# Patient Record
Sex: MALE | Race: WHITE | HISPANIC OR LATINO | Employment: OTHER | URBAN - METROPOLITAN AREA
[De-identification: names, ages, dates, MRNs, and addresses within clinical notes are randomized per-mention and may not be internally consistent; named-entity substitution may affect disease eponyms.]

---

## 2017-01-05 DIAGNOSIS — I10 ESSENTIAL HYPERTENSION: ICD-10-CM

## 2017-01-05 RX ORDER — AMLODIPINE BESYLATE 5 MG/1
TABLET ORAL
Qty: 30 TABLET | Refills: 0 | OUTPATIENT
Start: 2017-01-05

## 2017-01-05 RX ORDER — CLONAZEPAM 0.5 MG/1
TABLET ORAL
Qty: 30 TABLET | Refills: 0 | OUTPATIENT
Start: 2017-01-05

## 2017-01-10 ENCOUNTER — OFFICE VISIT (OUTPATIENT)
Dept: INTERNAL MEDICINE | Facility: CLINIC | Age: 82
End: 2017-01-10
Payer: COMMERCIAL

## 2017-01-10 VITALS
SYSTOLIC BLOOD PRESSURE: 160 MMHG | TEMPERATURE: 97 F | OXYGEN SATURATION: 96 % | WEIGHT: 221.56 LBS | DIASTOLIC BLOOD PRESSURE: 76 MMHG | HEIGHT: 68 IN | HEART RATE: 61 BPM | BODY MASS INDEX: 33.58 KG/M2

## 2017-01-10 DIAGNOSIS — G47.33 OSA (OBSTRUCTIVE SLEEP APNEA): ICD-10-CM

## 2017-01-10 DIAGNOSIS — F32.9 MAJOR DEPRESSION, CHRONIC: ICD-10-CM

## 2017-01-10 DIAGNOSIS — F41.9 ANXIETY: ICD-10-CM

## 2017-01-10 DIAGNOSIS — I10 ESSENTIAL HYPERTENSION: Primary | ICD-10-CM

## 2017-01-10 DIAGNOSIS — F51.04 CHRONIC INSOMNIA: ICD-10-CM

## 2017-01-10 PROCEDURE — 1125F AMNT PAIN NOTED PAIN PRSNT: CPT | Mod: S$GLB,,, | Performed by: INTERNAL MEDICINE

## 2017-01-10 PROCEDURE — 99214 OFFICE O/P EST MOD 30 MIN: CPT | Mod: S$GLB,,, | Performed by: INTERNAL MEDICINE

## 2017-01-10 PROCEDURE — 3077F SYST BP >= 140 MM HG: CPT | Mod: S$GLB,,, | Performed by: INTERNAL MEDICINE

## 2017-01-10 PROCEDURE — 99999 PR PBB SHADOW E&M-EST. PATIENT-LVL III: CPT | Mod: PBBFAC,,, | Performed by: INTERNAL MEDICINE

## 2017-01-10 PROCEDURE — 1159F MED LIST DOCD IN RCRD: CPT | Mod: S$GLB,,, | Performed by: INTERNAL MEDICINE

## 2017-01-10 PROCEDURE — 1157F ADVNC CARE PLAN IN RCRD: CPT | Mod: S$GLB,,, | Performed by: INTERNAL MEDICINE

## 2017-01-10 PROCEDURE — 1160F RVW MEDS BY RX/DR IN RCRD: CPT | Mod: S$GLB,,, | Performed by: INTERNAL MEDICINE

## 2017-01-10 PROCEDURE — 3078F DIAST BP <80 MM HG: CPT | Mod: S$GLB,,, | Performed by: INTERNAL MEDICINE

## 2017-01-10 RX ORDER — CLONAZEPAM 0.5 MG/1
0.5 TABLET ORAL NIGHTLY
Qty: 30 TABLET | Refills: 5 | Status: SHIPPED | OUTPATIENT
Start: 2017-01-10 | End: 2017-07-05 | Stop reason: SDUPTHER

## 2017-01-10 RX ORDER — SERTRALINE HYDROCHLORIDE 50 MG/1
50 TABLET, FILM COATED ORAL DAILY
Qty: 30 TABLET | Refills: 6 | Status: SHIPPED | OUTPATIENT
Start: 2017-01-10 | End: 2017-08-06 | Stop reason: SDUPTHER

## 2017-01-10 RX ORDER — TRAZODONE HYDROCHLORIDE 150 MG/1
150 TABLET ORAL NIGHTLY
Qty: 30 TABLET | Refills: 6 | Status: SHIPPED | OUTPATIENT
Start: 2017-01-10 | End: 2017-10-05 | Stop reason: SDUPTHER

## 2017-01-10 RX ORDER — LOSARTAN POTASSIUM 100 MG/1
100 TABLET ORAL DAILY
Qty: 30 TABLET | Refills: 6 | Status: SHIPPED | OUTPATIENT
Start: 2017-01-10 | End: 2017-05-30

## 2017-01-10 RX ORDER — AMLODIPINE BESYLATE 5 MG/1
5 TABLET ORAL DAILY
Qty: 30 TABLET | Refills: 6 | Status: SHIPPED | OUTPATIENT
Start: 2017-01-10 | End: 2017-08-06 | Stop reason: SDUPTHER

## 2017-01-10 RX ORDER — ESCITALOPRAM OXALATE 20 MG/1
TABLET ORAL
COMMUNITY
Start: 2016-03-29 | End: 2017-01-10

## 2017-01-10 NOTE — MR AVS SNAPSHOT
[facility]  [address] [facility] [place] [address]  Phone: [phone]  Fax: [phone]                  Santos Morales   1/10/2017 9:00 AM   Office Visit    Descripción:  Male : 1935   Personal Médico:  Ruby Dawkins DO   Departamento:  [facility]           Razón de la chelle     Medication Refill           Diagnósticos de Esta Visita        Comentarios    Essential hypertension    -  Primario     Major depression, chronic         Anxiety         Chronic insomnia         SAMUEL (obstructive sleep apnea)                Lista de tareas           Citas próximas        Personal Médico Departamento Tfno del dpto    2017 8:30 AM SPECIMEN, [facility] [phone]    2017 2:40 PM LABORATORY, [facility] [phone]      Metas (5 Years of Data)     Ninguna      Recetas para recoger        Disp Refills Start End    amlodipine (NORVASC) 5 MG tablet 30 tablet 6 1/10/2017     Take 1 tablet (5 mg total) by mouth once daily. - Oral    [facility]: [address] [place] [address] No. de tlfo: #: [phone]       losartan (COZAAR) 100 MG tablet 30 tablet 6 1/10/2017     Take 1 tablet (100 mg total) by mouth once daily. - Oral    [facility]: [address] [place] [address] No. de tlfo: #: [phone]       sertraline (ZOLOFT) 50 MG tablet 30 tablet 6 1/10/2017 1/10/2018    Take 1 tablet (50 mg total) by mouth once daily. - Oral    [facility]: [address] [place] [address] No. de tlfo: #: [phone]       trazodone (DESYREL) 150 MG tablet 30 tablet 6 1/10/2017 1/10/2018    Take 1 tablet (150 mg total) by mouth every evening. - Oral    [facility]: [facility] [address] [place] [address] [address] No. de tlfo: #: [phone]       clonazePAM (KLONOPIN) 0.5 MG tablet 30 tablet 5 1/10/2017     Take 1 tablet (0.5 mg total) by mouth  every evening. - Oral    [facility]: [place] [id number] - [place], [place] - [address] No. de tlfo: #: [phone]         [facility]     [facility] Línea de Enfermeras - Asistencia 24/7  Enfermeras registradas de [facility] pueden ayudarle a reservar j carlos chelle, proveer educación para la bethanie, asesoría clínica, y otros servicios de asesoramiento.   Llame para zeferino servicio gratuito a [phone].             Medicamentos           Mensaje sobre Medicamentos     Verificar los cambios y / o adiciones a jamison régimen de medicación son los mismos que discutir con jamison médico. Si cualquiera de estos cambios o adiciones son incorrectos, por favor notifique a jamison proveedor de atención médica.        CAMBIAR la forma en que está tomando estos medicamentos     Start Taking Instead of    amlodipine (NORVASC) 5 MG tablet amlodipine (NORVASC) 5 MG tablet    Dosage:  Take 1 tablet (5 mg total) by mouth once daily. Dosage:  TAKE ONE TABLET BY MOUTH ONCE DAILY    Reason for Change:  Reorder     losartan (COZAAR) 100 MG tablet losartan (COZAAR) 100 MG tablet    Dosage:  Take 1 tablet (100 mg total) by mouth once daily.     Reason for Change:  Reorder     clonazePAM (KLONOPIN) 0.5 MG tablet clonazePAM (KLONOPIN) 0.5 MG tablet    Dosage:  Take 1 tablet (0.5 mg total) by mouth every evening. Dosage:  TAKE ONE TABLET BY MOUTH ONCE DAILY    Reason for Change:  Reorder       DEJAR de kay estos medicamentos     escitalopram oxalate (LEXAPRO) 20 MG tablet TAKE ONE TABLET BY MOUTH ONCE DAILY           Verifique que la siguiente lista de medicamentos es j carlos representación exacta de los medicamentos que está tomando actualmente. Si no hay ningunos reportados, la lista puede estar en quiroz. Si no es correcta, por favor póngase en contacto con jamison proveedor de atención médica. Lleve esta lista con usted en cheko de emergencia.           Medicamentos Actuales     amlodipine (NORVASC) 5 MG tablet Take 1 tablet (5 mg total) by  "mouth once daily.    aspirin 81 MG Chew Take 81 mg by mouth once daily.    clonazePAM (KLONOPIN) 0.5 MG tablet Take 1 tablet (0.5 mg total) by mouth every evening.    ginkgo biloba 120 mg Tab Take 1 tablet by mouth once daily.    sertraline (ZOLOFT) 50 MG tablet Take 1 tablet (50 mg total) by mouth once daily.    trazodone (DESYREL) 150 MG tablet Take 1 tablet (150 mg total) by mouth every evening.    losartan (COZAAR) 100 MG tablet Take 1 tablet (100 mg total) by mouth once daily.           Información de referencia clínica           Signos vitales - más recientes  Última actualización: 1/10/2017  9:23 AM por Wendy RODRIGUEZ Pulso Temperatura [place]    (!) 160/76 (BP Location: Right arm, Patient Position: Sitting, BP Method: Manual) 61 97.4 °F (36.3 °C) (Tympanic) 5' 8" (1.727 m)    Peso SpO2 BMI (IMC)       100.5 kg (221 lb 9 oz) 96% 33.69 kg/m2       Blood Pressure          Most Recent Value    BP  (!)  160/76      Alergias     A partir del:  1/10/2017        No Known Allergies      Vacunas     Administradas en la fecha de la visita:  1/10/2017        None      Orders Placed During Today's Visit     Exámenes/Procedimientos futuros Se espera el Vence    CBC auto differential  1/10/2017 4/10/2017    Urinalysis  1/10/2017 4/10/2017    Exámenes de laboratorio recurrentes Intervalo Vence    Basic metabolic panel  Every 6 Months until 3/11/2019 3/11/2019    Lipid panel  Every 6 Months until 3/11/2019 3/11/2019      Registrarse para [facility]     La activación de jamison cuenta [facility] es tan fácil ind[place] 1-2-3!    1) Ir a [email or web address], seleccione Registrarse Ahora, meter el código de activación y jamison fecha de nacimiento, y seleccione Próximo.    [id number]  Expires: 2/24/2017  9:57 AM      2) Crear un nombre de usuario y contraseña para usar cuando se visita [facility] en el futuro y selecciona j carlos pregunta de seguridad en cheko de que pierda jamison contraseña y seleccione Próximo.    3) Introduzca jamison dirección de correo " electrónico y caleb shawna en Registrarse!    Información Adicional  Si tiene alguna pregunta, por favor, e-mail [email or web address] o yael villagomez [phone] para hablar con nuestro personal. Recuerde, [facility] no debe ser usada para necesidades urgentes. En cheko de emergencia médica, yael villagomez [phone].                 Santos Morales   1/10/2017 9:00 AM   Office Visit    Description:  Male : 1935   Provider:  Ruby Dawkins DO   Department:  [facility]           Reason for Visit     Medication Refill           Diagnoses this Visit        Comments    Essential hypertension    -  Primary     Major depression, chronic         Anxiety         Chronic insomnia         SAMUEL (obstructive sleep apnea)                To Do List           Future Appointments        Provider Department Dept Phone    2017 8:30 AM SPECIMEN, [facility] [phone]    2017 2:40 PM LABORATORY, [address] [facility] [phone]      Goals     None       These Medications        Disp Refills Start End    amlodipine (NORVASC) 5 MG tablet 30 tablet 6 1/10/2017     Take 1 tablet (5 mg total) by mouth once daily. - Oral    Pharmacy[facility] [address] [place] [address] [facility]: [phone]       losartan (COZAAR) 100 MG tablet 30 tablet 6 1/10/2017     Take 1 tablet (100 mg total) by mouth once daily. - Oral    Pharmacy: [facility] [address] [place] [address] [facility] #: [phone]       sertraline (ZOLOFT) 50 MG tablet 30 tablet 6 1/10/2017 1/10/2018    Take 1 tablet (50 mg total) by mouth once daily. - Oral    Pharmacy: [facility] [address] [place] [address]  [facility] #: [phone]       trazodone (DESYREL) 150 MG tablet 30 tablet 6 1/10/2017 1/10/2018    Take 1 tablet (150 mg total) by mouth every evening. - Oral    Pharmacy: [facility] - [place] [facility] [address] Ph #: [phone]       clonazePAM  (KLONOPIN) 0.5 MG tablet 30 tablet 5 1/10/2017     Take 1 tablet (0.5 mg total) by mouth every evening. - Oral    Pharm[facility] [place] [address] [address]  #: [phone]         [facility]     [facility] On Call Nurse Care Line - 24/7 Assistance  Registered nurses in the [facility] On Call Center provide clinical advisement, health education, appointment booking, and other advisory services.  Call for this free service at [phone].             Medications           Message regarding Medications     Verify the changes and/or additions to your medication regime listed below are the same as discussed with your clinician today.  If any of these changes or additions are incorrect, please notify your healthcare provider.        CHANGE how you are taking these medications     Start Taking Instead of    amlodipine (NORVASC) 5 MG tablet amlodipine (NORVASC) 5 MG tablet    Dosage:  Take 1 tablet (5 mg total) by mouth once daily. Dosage:  TAKE ONE TABLET BY MOUTH ONCE DAILY    Reason for Change:  Reorder     losartan (COZAAR) 100 MG tablet losartan (COZAAR) 100 MG tablet    Dosage:  Take 1 tablet (100 mg total) by mouth once daily.     Reason for Change:  Reorder     clonazePAM (KLONOPIN) 0.5 MG tablet clonazePAM (KLONOPIN) 0.5 MG tablet    Dosage:  Take 1 tablet (0.5 mg total) by mouth every evening. Dosage:  TAKE ONE TABLET BY MOUTH ONCE DAILY    Reason for Change:  Reorder       STOP taking these medications     escitalopram oxalate (LEXAPRO) 20 MG tablet TAKE ONE TABLET BY MOUTH ONCE DAILY           Verify that the below list of medications is an accurate representation of the medications you are currently taking.  If none reported, the list may be blank. If incorrect, please contact your healthcare provider. Carry this list with you in case of emergency.           Current Medications     amlodipine (NORVASC) 5 MG tablet Take 1 tablet (5 mg total) by mouth once daily.    aspirin 81 MG  "Chew Take 81 mg by mouth once daily.    clonazePAM (KLONOPIN) 0.5 MG tablet Take 1 tablet (0.5 mg total) by mouth every evening.    ginkgo biloba 120 mg Tab Take 1 tablet by mouth once daily.    sertraline (ZOLOFT) 50 MG tablet Take 1 tablet (50 mg total) by mouth once daily.    trazodone (DESYREL) 150 MG tablet Take 1 tablet (150 mg total) by mouth every evening.    losartan (COZAAR) 100 MG tablet Take 1 tablet (100 mg total) by mouth once daily.           Clinical Reference Information           Vital Signs - Last Recorded  Most recent update: 1/10/2017  9:23 AM by Wendy Pepper    BP Pulse Temp Ht    (!) 160/76 (BP Location: Right arm, Patient Position: Sitting, BP Method: Manual) 61 97.4 °F (36.3 °C) (Tympanic) 5' 8" (1.727 m)    Wt SpO2 BMI       100.5 kg (221 lb 9 oz) 96% 33.69 kg/m2       Blood Pressure          Most Recent Value    BP  (!)  160/76      Allergies as of 1/10/2017     No Known Allergies      Immunizations Administered on Date of Encounter - 1/10/2017     None      Orders Placed During Today's Visit     Future Labs/Procedures Expected by Expires    CBC auto differential  1/10/2017 4/10/2017    Urinalysis  1/10/2017 4/10/2017    Recurring Lab Work Interval Expires    Basic metabolic panel  Every 6 Months until 3/11/2019 3/11/2019    Lipid panel  Every 6 Months until 3/11/2019 3/11/2019      [facility] Sign-Up     Activating your [facility] account is as easy as 1-2-3!     1) Visit [email or web address], select Sign Up Now, enter this activation code and your date of birth, then select Next.  [email or web address]  Expires: 2/24/2017  9:57 AM      2) Create a username and password to use when you visit [facility] in the future and select a security question in case you lose your password and select Next.    3) Enter your e-mail address and click Sign Up!    Additional Information  If you have questions, please e-mail [email or web address] or call [phone] to talk to our [facility] staff. Remember, [facility] is " NOT to be used for urgent needs. For medical emergencies, dial 911.

## 2017-01-10 NOTE — PROGRESS NOTES
Subjective:       Patient ID: Santos Morales is a 81 y.o. male.    Chief Complaint: Establish Care    HPI Comments: 81 y.o. White male     Patient presents with:  Establish Care    HPI: Here today to establish care. He was previously a patient of Dr. Medina. He is here with his wife.   HTN--elevated due to being off losartan x 1 week. He did not think he needed to take it. He has been compliant with amlodipine. He denies symptoms.   Depression/anxiety--stable on sertraline. He has mild depression symptoms, but the medication has helped a lot. He takes clonazepam at night for anxiety and to help him sleep.   SAMUEL--he inconsistently wears his CPAP.     Past Medical History:    Anxiety                                                       Depression                                                    Hypertension                                                  Insomnia                                                      Mobitz type 1 second degree AV block                            Comment:followed by Dr. Hardy, Cardiology    SAMUEL (obstructive sleep apnea)                                 Past Surgical History:    CATARACT EXTRACTION W/  INTRAOCULAR LENS IMPLA* Bilateral               BACK SURGERY                                                   History reviewed.  No pertinent family history.    Current Outpatient Prescriptions:     amlodipine (NORVASC) 5 MG tablet, Take 1 tablet (5 mg total) by mouth once daily., Disp: 30 tablet, Rfl: 6    aspirin 81 MG Chew, Take 81 mg by mouth once daily., Disp: , Rfl:     clonazePAM (KLONOPIN) 0.5 MG tablet, Take 1 tablet (0.5 mg total) by mouth every evening., Disp: 30 tablet, Rfl: 5    ginkgo biloba 120 mg Tab, Take 1 tablet by mouth once daily., Disp: , Rfl:     sertraline (ZOLOFT) 50 MG tablet, Take 1 tablet (50 mg total) by mouth once daily., Disp: 30 tablet, Rfl: 6    trazodone (DESYREL) 150 MG tablet, Take 1 tablet (150 mg total) by mouth every evening., Disp: 30  tablet, Rfl: 6    losartan (COZAAR) 100 MG tablet, Take 1 tablet (100 mg total) by mouth once daily., Disp: 30 tablet, Rfl: 6    Allergies:   Review of patient's allergies indicates:  No Known Allergies            Review of Systems   Constitutional: Negative for fever and unexpected weight change.   HENT: Positive for hearing loss (wears hearing aids).    Respiratory: Negative for cough and shortness of breath.    Cardiovascular: Negative for chest pain and leg swelling.   Gastrointestinal: Positive for constipation. Negative for abdominal pain and diarrhea.   Genitourinary: Negative for dysuria.   Musculoskeletal: Negative for gait problem.   Neurological: Negative for dizziness and headaches.   Psychiatric/Behavioral: Positive for dysphoric mood and sleep disturbance. The patient is nervous/anxious.        Objective:      Physical Exam   Constitutional: He is oriented to person, place, and time. He appears well-developed and well-nourished. No distress.   Eyes: No scleral icterus.   Neck: No tracheal deviation present. No thyromegaly present.   Cardiovascular: Normal rate, regular rhythm and normal heart sounds.    Pulmonary/Chest: Effort normal and breath sounds normal. No respiratory distress.   Abdominal: Soft. Bowel sounds are normal.   Musculoskeletal: He exhibits no edema.   Lymphadenopathy:     He has no cervical adenopathy.   Neurological: He is alert and oriented to person, place, and time.   Skin: Skin is warm and dry.   Psychiatric: He has a normal mood and affect.   Vitals reviewed.      Assessment:       1. Essential hypertension    2. Major depression, chronic    3. Anxiety    4. Chronic insomnia    5. SAMUEL (obstructive sleep apnea)        Plan:       Santos was seen today for establish care.    Diagnoses and all orders for this visit:    Essential hypertension  -     amlodipine (NORVASC) 5 MG tablet; Take 1 tablet (5 mg total) by mouth once daily.  -     losartan (COZAAR) 100 MG tablet; Take 1  tablet (100 mg total) by mouth once daily.  -     Basic metabolic panel; Standing  -     Lipid panel; Standing  -     Urinalysis; Future  -     CBC auto differential; Future    Major depression, chronic  -     sertraline (ZOLOFT) 50 MG tablet; Take 1 tablet (50 mg total) by mouth once daily.    Anxiety  -     clonazePAM (KLONOPIN) 0.5 MG tablet; Take 1 tablet (0.5 mg total) by mouth every evening.    Chronic insomnia  -     trazodone (DESYREL) 150 MG tablet; Take 1 tablet (150 mg total) by mouth every evening.    SAMUEL (obstructive sleep apnea)    Schedule labs.     Nurse b/p visit in 4 weeks.

## 2017-01-11 ENCOUNTER — LAB VISIT (OUTPATIENT)
Dept: LAB | Facility: HOSPITAL | Age: 82
End: 2017-01-11
Attending: INTERNAL MEDICINE
Payer: COMMERCIAL

## 2017-01-11 DIAGNOSIS — I10 ESSENTIAL HYPERTENSION: ICD-10-CM

## 2017-01-11 LAB
ANION GAP SERPL CALC-SCNC: 7 MMOL/L
BASOPHILS # BLD AUTO: 0.02 K/UL
BASOPHILS NFR BLD: 0.3 %
BUN SERPL-MCNC: 21 MG/DL
CALCIUM SERPL-MCNC: 9.6 MG/DL
CHLORIDE SERPL-SCNC: 105 MMOL/L
CHOLEST/HDLC SERPL: 2.9 {RATIO}
CO2 SERPL-SCNC: 26 MMOL/L
CREAT SERPL-MCNC: 1.1 MG/DL
DIFFERENTIAL METHOD: NORMAL
EOSINOPHIL # BLD AUTO: 0.2 K/UL
EOSINOPHIL NFR BLD: 3.3 %
ERYTHROCYTE [DISTWIDTH] IN BLOOD BY AUTOMATED COUNT: 14 %
EST. GFR  (AFRICAN AMERICAN): >60 ML/MIN/1.73 M^2
EST. GFR  (NON AFRICAN AMERICAN): >60 ML/MIN/1.73 M^2
GLUCOSE SERPL-MCNC: 93 MG/DL
HCT VFR BLD AUTO: 43.2 %
HDL/CHOLESTEROL RATIO: 34.8 %
HDLC SERPL-MCNC: 164 MG/DL
HDLC SERPL-MCNC: 57 MG/DL
HGB BLD-MCNC: 14.3 G/DL
LDLC SERPL CALC-MCNC: 91.4 MG/DL
LYMPHOCYTES # BLD AUTO: 1.7 K/UL
LYMPHOCYTES NFR BLD: 25.6 %
MCH RBC QN AUTO: 30.3 PG
MCHC RBC AUTO-ENTMCNC: 33.1 %
MCV RBC AUTO: 92 FL
MONOCYTES # BLD AUTO: 0.6 K/UL
MONOCYTES NFR BLD: 9.6 %
NEUTROPHILS # BLD AUTO: 4.1 K/UL
NEUTROPHILS NFR BLD: 61 %
NONHDLC SERPL-MCNC: 107 MG/DL
PLATELET # BLD AUTO: 191 K/UL
PMV BLD AUTO: 10.5 FL
POTASSIUM SERPL-SCNC: 4.6 MMOL/L
RBC # BLD AUTO: 4.72 M/UL
SODIUM SERPL-SCNC: 138 MMOL/L
TRIGL SERPL-MCNC: 78 MG/DL
WBC # BLD AUTO: 6.64 K/UL

## 2017-01-11 PROCEDURE — 85025 COMPLETE CBC W/AUTO DIFF WBC: CPT

## 2017-01-11 PROCEDURE — 80061 LIPID PANEL: CPT

## 2017-01-11 PROCEDURE — 80048 BASIC METABOLIC PNL TOTAL CA: CPT

## 2017-01-11 PROCEDURE — 36415 COLL VENOUS BLD VENIPUNCTURE: CPT

## 2017-01-16 ENCOUNTER — TELEPHONE (OUTPATIENT)
Dept: INTERNAL MEDICINE | Facility: CLINIC | Age: 82
End: 2017-01-16

## 2017-01-16 NOTE — TELEPHONE ENCOUNTER
----- Message from Ruby Dawkins DO sent at 1/13/2017  5:17 PM CST -----  Notify patient UA does not show any significant abnormalities.

## 2017-01-16 NOTE — TELEPHONE ENCOUNTER
----- Message from Ruby Dawkins DO sent at 1/13/2017  5:16 PM CST -----  Notify patient CBC, BMP and lipid panel are acceptable.

## 2017-05-30 ENCOUNTER — OFFICE VISIT (OUTPATIENT)
Dept: INTERNAL MEDICINE | Facility: CLINIC | Age: 82
End: 2017-05-30
Payer: COMMERCIAL

## 2017-05-30 ENCOUNTER — HOSPITAL ENCOUNTER (OUTPATIENT)
Dept: RADIOLOGY | Facility: HOSPITAL | Age: 82
Discharge: HOME | End: 2017-05-30
Attending: INTERNAL MEDICINE
Payer: COMMERCIAL

## 2017-05-30 VITALS
HEIGHT: 69 IN | OXYGEN SATURATION: 97 % | HEART RATE: 64 BPM | TEMPERATURE: 97 F | DIASTOLIC BLOOD PRESSURE: 60 MMHG | SYSTOLIC BLOOD PRESSURE: 130 MMHG | BODY MASS INDEX: 32.33 KG/M2 | WEIGHT: 218.25 LBS

## 2017-05-30 DIAGNOSIS — G89.29 CHRONIC PAIN OF BOTH SHOULDERS: ICD-10-CM

## 2017-05-30 DIAGNOSIS — R41.3 MEMORY LOSS: ICD-10-CM

## 2017-05-30 DIAGNOSIS — M25.511 ACUTE PAIN OF RIGHT SHOULDER: Primary | ICD-10-CM

## 2017-05-30 DIAGNOSIS — M25.512 CHRONIC PAIN OF BOTH SHOULDERS: ICD-10-CM

## 2017-05-30 DIAGNOSIS — M25.511 CHRONIC PAIN OF BOTH SHOULDERS: ICD-10-CM

## 2017-05-30 DIAGNOSIS — R26.89 IMBALANCE: ICD-10-CM

## 2017-05-30 DIAGNOSIS — R26.9 GAIT DISTURBANCE: ICD-10-CM

## 2017-05-30 PROCEDURE — 73030 X-RAY EXAM OF SHOULDER: CPT | Mod: TC,50

## 2017-05-30 PROCEDURE — 99214 OFFICE O/P EST MOD 30 MIN: CPT | Mod: S$GLB,,, | Performed by: INTERNAL MEDICINE

## 2017-05-30 PROCEDURE — 1125F AMNT PAIN NOTED PAIN PRSNT: CPT | Mod: S$GLB,,, | Performed by: INTERNAL MEDICINE

## 2017-05-30 PROCEDURE — 1159F MED LIST DOCD IN RCRD: CPT | Mod: S$GLB,,, | Performed by: INTERNAL MEDICINE

## 2017-05-30 PROCEDURE — 73030 X-RAY EXAM OF SHOULDER: CPT | Mod: 26,50,, | Performed by: RADIOLOGY

## 2017-05-30 PROCEDURE — 99999 PR PBB SHADOW E&M-EST. PATIENT-LVL IV: CPT | Mod: PBBFAC,,, | Performed by: INTERNAL MEDICINE

## 2017-05-30 NOTE — PROGRESS NOTES
Santos Morales  82 y.o. White male    Chief Complaint   Patient presents with    Shoulder Pain     HPI: Presents to the clinic with his wife with complaint of right shoulder pain x 1 month. He fell while in [place] and recently re-injured it. He did not seek medical care. He is unable to raise his arm beyond 30 degrees without significant discomfort. The pain is in his anterior shoulder. He has pain in both shoulders, which is chronic, but it has worsened with injury.   He has also been having worsening memory loss. The problem is with his short term memory. He has not had any head injuries. He denies headaches. His balance is off and he is bumping into things. He denies incontinence.     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 Hardy, Cardiology    SAMUEL (obstructive sleep apnea)        Current Outpatient Prescriptions on File Prior to Visit   Medication Sig Dispense Refill    amlodipine (NORVASC) 5 MG tablet Take 1 tablet (5 mg total) by mouth once daily. 30 tablet 6    aspirin 81 MG Chew Take 81 mg by mouth once daily.      clonazePAM (KLONOPIN) 0.5 MG tablet Take 1 tablet (0.5 mg total) by mouth every evening. 30 tablet 5    ginkgo biloba 120 mg Tab Take 1 tablet by mouth once daily.      sertraline (ZOLOFT) 50 MG tablet Take 1 tablet (50 mg total) by mouth once daily. 30 tablet 6    trazodone (DESYREL) 150 MG tablet Take 1 tablet (150 mg total) by mouth every evening. 30 tablet 6     Allergies:  Review of patient's allergies indicates:  No Known Allergies    ROS:  Denies headache, dizziness, chest pain or shortness of breath    PHYSICAL EXAM:  VITAL SIGNS: Reviewed  GENERAL: Alert   HEART: Regular rate   LUNGS: Respirations unlabored  EXTREMITIES: Reduced ROM in shoulders R>L, tenderness in anterior shoulder without lesions, strength decreased in right UE, pulses intact  NEURO: No focal  neurological deficits     ASSESSMENT/PLAN:  Santos was seen today for shoulder pain.    Diagnoses and all orders for this visit:    Acute pain of right shoulder  -     MRI Upper Extremity Joint Without Contraast Right; Future    Chronic pain of both shoulders  -     X-Ray Shoulder 2 or More views Bilateral; Future    Memory loss  -     TSH; Future  -     Vitamin B12; Future  -     MRI Brain Without Contrast; Future    Imbalance  -     Vitamin B12; Future  -     MRI Brain Without Contrast; Future    Gait disturbance  -     Vitamin B12; Future  -     MRI Brain Without Contrast; Future    Schedule labs and imaging.

## 2017-05-31 ENCOUNTER — TELEPHONE (OUTPATIENT)
Dept: INTERNAL MEDICINE | Facility: CLINIC | Age: 82
End: 2017-05-31

## 2017-05-31 DIAGNOSIS — M25.511 ACUTE PAIN OF RIGHT SHOULDER: ICD-10-CM

## 2017-05-31 DIAGNOSIS — G89.29 CHRONIC PAIN OF BOTH SHOULDERS: Primary | ICD-10-CM

## 2017-05-31 DIAGNOSIS — M25.511 CHRONIC PAIN OF BOTH SHOULDERS: Primary | ICD-10-CM

## 2017-05-31 DIAGNOSIS — M25.512 CHRONIC PAIN OF BOTH SHOULDERS: Primary | ICD-10-CM

## 2017-06-01 ENCOUNTER — HOSPITAL ENCOUNTER (OUTPATIENT)
Dept: RADIOLOGY | Facility: HOSPITAL | Age: 82
Discharge: HOME | End: 2017-06-01
Attending: INTERNAL MEDICINE
Payer: COMMERCIAL

## 2017-06-01 DIAGNOSIS — M25.511 ACUTE PAIN OF RIGHT SHOULDER: ICD-10-CM

## 2017-06-01 PROCEDURE — 73221 MRI JOINT UPR EXTREM W/O DYE: CPT | Mod: TC,RT

## 2017-06-02 ENCOUNTER — TELEPHONE (OUTPATIENT)
Dept: INTERNAL MEDICINE | Facility: CLINIC | Age: 82
End: 2017-06-02

## 2017-06-02 NOTE — TELEPHONE ENCOUNTER
----- Message from Ruby Dawkins DO sent at 5/31/2017  1:12 PM CDT -----  Schedule appointment with orthopedics after MRI of shoulder completed.   Results and recommendations sent to patient's [facility] account.

## 2017-06-02 NOTE — TELEPHONE ENCOUNTER
----- Message from Helga Hickman sent at 6/2/2017  2:53 PM CDT -----  Contact: Rosa - wife  She would like for you to call her at [phone].  This is her cell.                          medrano

## 2017-06-06 ENCOUNTER — HOSPITAL ENCOUNTER (OUTPATIENT)
Dept: RADIOLOGY | Facility: HOSPITAL | Age: 82
Discharge: HOME | End: 2017-06-06
Attending: INTERNAL MEDICINE
Payer: COMMERCIAL

## 2017-06-06 ENCOUNTER — TELEPHONE (OUTPATIENT)
Dept: INTERNAL MEDICINE | Facility: CLINIC | Age: 82
End: 2017-06-06

## 2017-06-06 DIAGNOSIS — R26.89 IMBALANCE: ICD-10-CM

## 2017-06-06 DIAGNOSIS — R41.3 MEMORY LOSS: ICD-10-CM

## 2017-06-06 DIAGNOSIS — R26.9 GAIT DISTURBANCE: ICD-10-CM

## 2017-06-06 PROCEDURE — 70551 MRI BRAIN STEM W/O DYE: CPT | Mod: TC

## 2017-06-06 NOTE — TELEPHONE ENCOUNTER
----- Message from Carin Heredia sent at 6/6/2017  4:04 PM CDT -----  Contact: pt  pleae call with test results at [phone]

## 2017-06-06 NOTE — TELEPHONE ENCOUNTER
----- Message from Carin Heredia sent at 6/6/2017  4:04 PM CDT -----  Contact: pt  pleae call with test results at [phone]

## 2017-06-08 ENCOUNTER — OFFICE VISIT (OUTPATIENT)
Dept: ORTHOPEDICS | Facility: CLINIC | Age: 82
End: 2017-06-08
Payer: COMMERCIAL

## 2017-06-08 VITALS
DIASTOLIC BLOOD PRESSURE: 54 MMHG | WEIGHT: 218 LBS | HEART RATE: 63 BPM | HEIGHT: 69 IN | BODY MASS INDEX: 32.29 KG/M2 | SYSTOLIC BLOOD PRESSURE: 141 MMHG

## 2017-06-08 DIAGNOSIS — M75.121 COMPLETE TEAR OF RIGHT ROTATOR CUFF: Primary | ICD-10-CM

## 2017-06-08 DIAGNOSIS — G89.29 CHRONIC RIGHT SHOULDER PAIN: ICD-10-CM

## 2017-06-08 DIAGNOSIS — M25.511 CHRONIC RIGHT SHOULDER PAIN: ICD-10-CM

## 2017-06-08 DIAGNOSIS — M75.41 IMPINGEMENT SYNDROME OF RIGHT SHOULDER: ICD-10-CM

## 2017-06-08 DIAGNOSIS — M75.21 BICEPS TENDINITIS OF RIGHT SHOULDER: ICD-10-CM

## 2017-06-08 DIAGNOSIS — M19.011 DJD OF RIGHT AC (ACROMIOCLAVICULAR) JOINT: ICD-10-CM

## 2017-06-08 DIAGNOSIS — M19.012 GLENOHUMERAL ARTHRITIS, LEFT: ICD-10-CM

## 2017-06-08 PROCEDURE — 1125F AMNT PAIN NOTED PAIN PRSNT: CPT | Mod: S$GLB,,, | Performed by: PHYSICIAN ASSISTANT

## 2017-06-08 PROCEDURE — 99999 PR PBB SHADOW E&M-EST. PATIENT-LVL III: CPT | Mod: PBBFAC,,, | Performed by: PHYSICIAN ASSISTANT

## 2017-06-08 PROCEDURE — 1159F MED LIST DOCD IN RCRD: CPT | Mod: S$GLB,,, | Performed by: PHYSICIAN ASSISTANT

## 2017-06-08 PROCEDURE — 99203 OFFICE O/P NEW LOW 30 MIN: CPT | Mod: S$GLB,,, | Performed by: PHYSICIAN ASSISTANT

## 2017-06-08 RX ORDER — MELOXICAM 15 MG/1
15 TABLET ORAL DAILY
Qty: 30 TABLET | Refills: 1 | Status: SHIPPED | OUTPATIENT
Start: 2017-06-08 | End: 2017-09-12 | Stop reason: SDUPTHER

## 2017-06-08 NOTE — LETTER
June 8, 2017      Ruby Dawkins DO  [address] [place] [address]           [facility] - Orthopedics  [address] [place] [address]  Phone: [phone]  Fax: [phone]          Patient: Santos Morales   MR Number: [id number]   YOB: 1935   Date of Visit: 6/8/2017       Dear Dr. Ruby Dawkins:    Thank you for referring Santos Morales to me for evaluation. Attached you will find relevant portions of my assessment and plan of care.    If you have questions, please do not hesitate to call me. I look forward to following Santos Morales along with you.    Sincerely,    Namita Underwood PA-C    Enclosure  CC:  No Recipients    If you would like to receive this communication electronically, please contact [email or web address] or [phone] to request more information on P[facility] access.    For providers and/or their staff who would like to refer a patient to [facility], please contact us through our one-stop-shop provider referral line, [facility], at [phone].    If you feel you have received this communication in error or would no longer like to receive these types of communications, please e-mail [email or web address]

## 2017-06-08 NOTE — PROGRESS NOTES
Subjective:      Patient ID: Santos Morales is a 82 y.o. male.    Chief Complaint: Pain of the Right Shoulder      HPI: Santos Morales  is a 82 y.o. male who c/o Pain of the Right Shoulder   for duration of 42 years.  He denies an inciting injury from 2 years ago, but goes on to say that he had a fall on his right side about 2 months ago.  The pain in his right shoulder got much worse following the fall.  Pain level is 8 out of 10 in severity.  Quality is sharp, aching, and intermittent.  He points anteriorly as to where the pain is located.  Alleviating factors include nothing.  He has tried over-the-counter ibuprofen without relief of symptoms.  Aggravating factors include abduction as well as forward elevation above shoulder level.  In addition, he has left shoulder arthritis.  He says the left shoulder does pretty good for the most part.  He had problems with it years ago, but at present, he is not too concerned with it.  He complains of some mild decreased motion on the left side, but overall he does not complain about the left shoulder.    Review of Systems   Constitution: Negative for fever.   HENT: Negative for headaches.    Cardiovascular: Negative for chest pain.   Respiratory: Negative for cough and shortness of breath.    Skin: Negative for rash.   Musculoskeletal: Positive for joint pain, muscle weakness and stiffness. Negative for joint swelling.   Gastrointestinal: Negative for heartburn.   Neurological: Negative for numbness.         Objective:        General    Nursing note and vitals reviewed.  Constitutional: He is oriented to person, place, and time. He appears well-developed and well-nourished.   HENT:   Head: Normocephalic and atraumatic.   Eyes: EOM are normal.   Cardiovascular: Normal rate and regular rhythm.    Pulmonary/Chest: Effort normal.   Abdominal: Soft.   Neurological: He is alert and oriented to person, place, and time.   Psychiatric: He has a normal mood and affect. His behavior  is normal.         Right Shoulder Exam     Inspection/Observation   Swelling: absent  Bruising: absent  Scars: absent  Deformity: absent    Tenderness   The patient is tender to palpation of the biceps tendon and greater tuberosity.    Range of Motion   Active Abduction:  160 abnormal   Passive Abduction: abnormal   Extension: abnormal   Forward Flexion:  150 abnormal   Forward Elevation: abnormal  Adduction: normal  External Rotation 0 degrees: normal   Internal Rotation 0 degrees:  Sacrum abnormal     Tests & Signs   Drop Arm: negative  Hawkin's test: positive  Impingement: positive  Active Compression Test (Ellenton's Sign): positive  Speed's Test: positive    Other   Sensation: normal    Left Shoulder Exam     Range of Motion   Active Abduction: normal   Passive Abduction: normal   Forward Flexion: normal   Forward Elevation: normal  Adduction: normal  External Rotation 0 degrees: normal   Internal Rotation 0 degrees:  Sacrum abnormal       Muscle Strength   Right Upper Extremity   Shoulder Abduction: 5/5   Shoulder Internal Rotation: 5/5   Shoulder External Rotation: 4/5   Left Upper Extremity  Shoulder Abduction: 5/5   Shoulder Internal Rotation: 5/5   Shoulder External Rotation: 5/5     Vascular Exam     Right Pulses      Radial:                    2+      Capillary Refill  Right Hand: normal capillary refill            Xray:   Bilateral shoulders from 5/30/17 images and report were reviewed today.  I agree with the radiologist's interpretation.  Right shoulder:  Postsurgical changes noted with evidence of healed fracture in the mid third region RIGHT clavicle.  Widening of the a.c. joint with inferior spur formation.  Mild degenerative change RIGHT glenohumeral joint.  Remaining osseous structures appear intact.  Left shoulder:  Extensive degenerative change noted on the LEFT with marginal osteophyte formation, flattening of the humeral head and bone-on-bone articulation at the glenohumeral joint.   Calcification identified along the superior margin of the humeral head suggesting calcific tendinosis.  Mild degenerative change at the a.c. joint with minimal inferior spur formation.    MRI:   Right shoulder from 6/1/17 images and report were reviewed today.  I agree with the radiologist's interpretation.    1.  Massive rotator cuff tear.  2.  Circumferential labral tear.  3.  Advanced glenohumeral DJD.  4.  High-grade partial thickness biceps tendon tear with evidence of a biceps pulley lesion.  5.  Moderate joint effusion.    Assessment:       Encounter Diagnoses   Name Primary?    Complete tear of right rotator cuff Yes    Chronic right shoulder pain     DJD of right AC (acromioclavicular) joint     Impingement syndrome of right shoulder     Biceps tendinitis of right shoulder     Glenohumeral arthritis, left           Plan:       Santos was seen today for pain.    Diagnoses and all orders for this visit:    Complete tear of right rotator cuff  -     meloxicam (MOBIC) 15 MG tablet; Take 1 tablet (15 mg total) by mouth once daily. Take with food  -     Ambulatory Referral to Physical/Occupational Therapy    Chronic right shoulder pain  -     meloxicam (MOBIC) 15 MG tablet; Take 1 tablet (15 mg total) by mouth once daily. Take with food  -     Ambulatory Referral to Physical/Occupational Therapy    DJD of right AC (acromioclavicular) joint  -     meloxicam (MOBIC) 15 MG tablet; Take 1 tablet (15 mg total) by mouth once daily. Take with food  -     Ambulatory Referral to Physical/Occupational Therapy    Impingement syndrome of right shoulder  -     meloxicam (MOBIC) 15 MG tablet; Take 1 tablet (15 mg total) by mouth once daily. Take with food  -     Ambulatory Referral to Physical/Occupational Therapy    Biceps tendinitis of right shoulder  -     Ambulatory Referral to Physical/Occupational Therapy    Glenohumeral arthritis, left    Mr. Morales comes in today for new problem of the right shoulder.  He was  referred to primary care who ordered an MRI as well as some x-rays.  He has a massive rotator cuff tear in the right shoulder with a labral tear and advanced degenerative changes within the glenohumeral joint.  He also has some findings consistent with partial thickness biceps tendon tear.  I have discussed these findings with he and his wife.  He wishes to avoid surgery if at all possible.  Given that he is 82 years old, I think that's a reasonable request.  I will get him started on a prescription for meloxicam as above.  I have instructed him not to take any other oral anti-inflammatories while on meloxicam.  He may take Tylenol in addition to meloxicam as needed.  We have also discussed the option of physical therapy.  He wishes to pursue this.  I have counseled him on the fact that he may develop significant arthritis in this joint particularly without a surgery to address the massive rotator cuff tear.  Patient wishes to follow-up at the [place] location as I will be out on maternity leave starting in a few weeks.  I will have him see Dr. Jasso for further discussion regarding surgical intervention versus continued nonoperative treatment.  Patient verbalizes understanding and agrees with the above plan.    Return in about 6 weeks (around 7/20/2017) for consult with Dr. Jasso re RCT.          The patient understands, chooses and consents to this plan and accepts all   the risks which include but are not limited to the risks mentioned above.     Disclaimer: This note was prepared using a voice recognition system and is likely to have sound alike errors within the text.

## 2017-06-27 ENCOUNTER — PATIENT OUTREACH (OUTPATIENT)
Dept: ADMINISTRATIVE | Facility: HOSPITAL | Age: 82
End: 2017-06-27

## 2017-06-27 NOTE — PROGRESS NOTES
I have attempted without success to contact this patient by phone to schedule blood pressure recheck. Patient not available, left voicemail.

## 2017-07-05 DIAGNOSIS — F41.9 ANXIETY: ICD-10-CM

## 2017-07-06 RX ORDER — CLONAZEPAM 0.5 MG/1
0.5 TABLET ORAL NIGHTLY
Qty: 30 TABLET | Refills: 5 | Status: SHIPPED | OUTPATIENT
Start: 2017-07-06 | End: 2018-01-04 | Stop reason: SDUPTHER

## 2017-07-07 ENCOUNTER — TELEPHONE (OUTPATIENT)
Dept: INTERNAL MEDICINE | Facility: CLINIC | Age: 82
End: 2017-07-07

## 2017-07-07 NOTE — TELEPHONE ENCOUNTER
----- Message from Stephanie Montanez sent at 7/7/2017  1:27 PM CDT -----  Contact: Pt wife - Rosa   States she is calling to follow up on pt's refill request and can be reached at [phone]//thanks/dbw     States pt is out and to pls call it in today     1. What is the name of the medication you are requesting? clonazapam   2. What is the dose? unknwn on file per pt wife   3. How do you take the medication? Orally, topically, etc? Orally   4. How often do you take this medication? Once daily   5. Do you need a 30 day or 90 day supply? 90 days per pt wife   6. How many refills are you requesting? As many as possible per pt wife   7. What is your preferred pharmacy and location of the pharmacy? pt  8. Who can we contact with further questions? Pt wife     Pt pharm is   [facility] - [id number]  [address] [place] [address]  Phone: [phone] Fax: [phone]

## 2017-07-10 ENCOUNTER — TELEPHONE (OUTPATIENT)
Dept: INTERNAL MEDICINE | Facility: CLINIC | Age: 82
End: 2017-07-10

## 2017-07-10 NOTE — TELEPHONE ENCOUNTER
----- Message from Aidee Nicole sent at 7/10/2017 11:50 AM CDT -----  Contact: Patiemt wife, Rosa Lee states that the pharmacy has not received the authorization for her husbands medication clonazepam, please call Ms Lee back at [phone]. Thank you

## 2017-07-10 NOTE — TELEPHONE ENCOUNTER
Nurse spoke with wife, informed wife Rx was sent to [facility] on 7/6/17 to call and verifiy with pharmacy and contact nurse back. Wife voiced understanding

## 2017-07-11 ENCOUNTER — TELEPHONE (OUTPATIENT)
Dept: INTERNAL MEDICINE | Facility: CLINIC | Age: 82
End: 2017-07-11

## 2017-07-11 NOTE — TELEPHONE ENCOUNTER
----- Message from Duglas Lewis sent at 7/10/2017  1:28 PM CDT -----  Contact: pt's wife  The pt's wife is requesting a call back and can be reached at [phone]

## 2017-07-11 NOTE — TELEPHONE ENCOUNTER
Contacted wife of patient regarding her message that was left, she states that she in fact did receive her 's prescription.

## 2017-08-06 DIAGNOSIS — G89.29 CHRONIC RIGHT SHOULDER PAIN: ICD-10-CM

## 2017-08-06 DIAGNOSIS — M19.011 DJD OF RIGHT AC (ACROMIOCLAVICULAR) JOINT: ICD-10-CM

## 2017-08-06 DIAGNOSIS — I10 ESSENTIAL HYPERTENSION: ICD-10-CM

## 2017-08-06 DIAGNOSIS — F32.9 MAJOR DEPRESSION, CHRONIC: ICD-10-CM

## 2017-08-06 DIAGNOSIS — M75.41 IMPINGEMENT SYNDROME OF RIGHT SHOULDER: ICD-10-CM

## 2017-08-06 DIAGNOSIS — M25.511 CHRONIC RIGHT SHOULDER PAIN: ICD-10-CM

## 2017-08-06 DIAGNOSIS — M75.121 COMPLETE TEAR OF RIGHT ROTATOR CUFF: ICD-10-CM

## 2017-08-07 RX ORDER — SERTRALINE HYDROCHLORIDE 50 MG/1
TABLET, FILM COATED ORAL
Qty: 30 TABLET | Refills: 9 | Status: SHIPPED | OUTPATIENT
Start: 2017-08-07 | End: 2017-09-12 | Stop reason: SDUPTHER

## 2017-08-07 RX ORDER — AMLODIPINE BESYLATE 5 MG/1
TABLET ORAL
Qty: 30 TABLET | Refills: 9 | Status: SHIPPED | OUTPATIENT
Start: 2017-08-07 | End: 2018-07-23 | Stop reason: SDUPTHER

## 2017-09-12 ENCOUNTER — OFFICE VISIT (OUTPATIENT)
Dept: INTERNAL MEDICINE | Facility: CLINIC | Age: 82
End: 2017-09-12
Payer: COMMERCIAL

## 2017-09-12 VITALS
HEIGHT: 69 IN | BODY MASS INDEX: 32.69 KG/M2 | SYSTOLIC BLOOD PRESSURE: 145 MMHG | WEIGHT: 220.69 LBS | TEMPERATURE: 97 F | OXYGEN SATURATION: 96 % | DIASTOLIC BLOOD PRESSURE: 80 MMHG | HEART RATE: 56 BPM

## 2017-09-12 DIAGNOSIS — G89.29 CHRONIC PAIN OF BOTH SHOULDERS: ICD-10-CM

## 2017-09-12 DIAGNOSIS — F32.A DEPRESSION, UNSPECIFIED DEPRESSION TYPE: Primary | ICD-10-CM

## 2017-09-12 DIAGNOSIS — M25.512 CHRONIC PAIN OF BOTH SHOULDERS: ICD-10-CM

## 2017-09-12 DIAGNOSIS — M25.511 CHRONIC PAIN OF BOTH SHOULDERS: ICD-10-CM

## 2017-09-12 DIAGNOSIS — M25.511 CHRONIC RIGHT SHOULDER PAIN: ICD-10-CM

## 2017-09-12 DIAGNOSIS — F32.9 MAJOR DEPRESSION, CHRONIC: ICD-10-CM

## 2017-09-12 DIAGNOSIS — G89.29 CHRONIC RIGHT SHOULDER PAIN: ICD-10-CM

## 2017-09-12 PROCEDURE — 99213 OFFICE O/P EST LOW 20 MIN: CPT | Mod: S$GLB,,, | Performed by: FAMILY MEDICINE

## 2017-09-12 PROCEDURE — 3077F SYST BP >= 140 MM HG: CPT | Mod: S$GLB,,, | Performed by: FAMILY MEDICINE

## 2017-09-12 PROCEDURE — 99999 PR PBB SHADOW E&M-EST. PATIENT-LVL III: CPT | Mod: PBBFAC,,, | Performed by: FAMILY MEDICINE

## 2017-09-12 PROCEDURE — 1125F AMNT PAIN NOTED PAIN PRSNT: CPT | Mod: S$GLB,,, | Performed by: FAMILY MEDICINE

## 2017-09-12 PROCEDURE — 3079F DIAST BP 80-89 MM HG: CPT | Mod: S$GLB,,, | Performed by: FAMILY MEDICINE

## 2017-09-12 PROCEDURE — 3008F BODY MASS INDEX DOCD: CPT | Mod: S$GLB,,, | Performed by: FAMILY MEDICINE

## 2017-09-12 PROCEDURE — 1159F MED LIST DOCD IN RCRD: CPT | Mod: S$GLB,,, | Performed by: FAMILY MEDICINE

## 2017-09-12 RX ORDER — MELOXICAM 15 MG/1
15 TABLET ORAL DAILY
Qty: 30 TABLET | Refills: 1 | Status: SHIPPED | OUTPATIENT
Start: 2017-09-12 | End: 2017-12-15

## 2017-09-12 RX ORDER — SERTRALINE HYDROCHLORIDE 100 MG/1
100 TABLET, FILM COATED ORAL DAILY
Qty: 30 TABLET | Refills: 0 | Status: SHIPPED | OUTPATIENT
Start: 2017-09-12 | End: 2017-10-17 | Stop reason: SDUPTHER

## 2017-09-12 NOTE — PROGRESS NOTES
Subjective:       Patient ID: Santos Morales is a 82 y.o. male.    Chief Complaint: Depression and Shoulder Pain (bilateral)    HPI     81 yo M w/ PMH depression and bilateral shoulder pain - prenents with derpession and continues shoulder pain. Has been evaluated by Ortho - complete rotator tear - Patient does not want surgery. Patient is Romanian speaking and a telephone  was utilized [phone] ( [employer] service provided by nursing). Patient reports pain with his shoulders with getting dressed and moving. His wife feels his depression is worsening. He states it has been present for a very long time ( wife reports his family has a history of it, and patient has been depressed all his life). Patient is on zoloft 50mg and trazadone 150mg. He still is having very poor sleep. He denies any plan or intention of suicide.       Review of Systems   Constitutional: Negative for activity change, appetite change and fever.   HENT: Negative for congestion and sneezing.    Eyes: Negative for discharge.   Respiratory: Negative for cough and shortness of breath.    Cardiovascular: Negative for chest pain.   Gastrointestinal: Negative for abdominal pain, constipation and diarrhea.   Skin: Negative for color change.   Neurological: Negative for speech difficulty.   Psychiatric/Behavioral: Positive for sleep disturbance. Negative for suicidal ideas.       Objective:      Physical Exam   Constitutional: He is oriented to person, place, and time. He appears well-developed and well-nourished. No distress.   HENT:   Head: Normocephalic and atraumatic.   Right Ear: Hearing, tympanic membrane, external ear and ear canal normal.   Left Ear: Hearing, tympanic membrane, external ear and ear canal normal.   Nose: Nose normal. Right sinus exhibits no maxillary sinus tenderness and no frontal sinus tenderness. Left sinus exhibits no maxillary sinus tenderness and no frontal sinus tenderness.   Mouth/Throat: Uvula is midline,  oropharynx is clear and moist and mucous membranes are normal.   Eyes: Conjunctivae are normal. Right eye exhibits no discharge. Left eye exhibits no discharge.   Neck: Trachea normal, normal range of motion and full passive range of motion without pain.   Cardiovascular: Normal rate, regular rhythm, normal heart sounds and intact distal pulses.    Pulmonary/Chest: Effort normal and breath sounds normal. No respiratory distress. He has no decreased breath sounds. He has no wheezes.   Abdominal: Soft. Normal appearance and bowel sounds are normal. He exhibits no distension and no mass. There is no tenderness. There is no guarding. No hernia.   Musculoskeletal: Normal range of motion. He exhibits no edema.   Lymphadenopathy:     He has no cervical adenopathy.   Neurological: He is alert and oriented to person, place, and time.   Skin: Skin is warm, dry and intact. No rash noted. No erythema. No pallor.   Psychiatric: He has a normal mood and affect. His speech is normal and behavior is normal. Thought content normal.       Assessment:       1. Depression, unspecified depression type    2. Chronic pain of both shoulders    3. Major depression, chronic    4. Chronic right shoulder pain        Plan:         Major depression, chronic    Lifelong problem. Has been on medication for some time. Plan on increasing zoloft dose from 50 to 100mg. Patient to follow up in 4 weeks for review. Additionally discussed maintaining activity despite chronic pain in shoulders. Encouraged walking. I believe there to be a signficant connection between his chronic pain and his depression. Will likely require surgery in future. Refilling Mobic in effort to help.    -     sertraline (ZOLOFT) 100 MG tablet; Take 1 tablet (100 mg total) by mouth once daily.  Dispense: 30 tablet; Refill: 0    Complete tear of right rotator cuff  -      Patient declined surgery. We discussed pain management for chornic pain - but as of no patient and wife decided  not to present there, but reather to continue taking mobic (of which he has run out of). Continue to monitor in future, I believe he would benefit from pain management, as it may improve his mood as well. Plan on discussing PT at next visit. Patient was meant to follow up with Ortho MD, but unsure as to why didn't.                    No Follow-up on file.

## 2017-09-12 NOTE — PATIENT INSTRUCTIONS
Dr. Santacruz Instructions:      Depression:  We are increased Zoloft dose. This will take time work. I do want you to stay active as you can.    Shoulder pain:  Refilling mobic. We are going to check your blood work in 1 month. I would like to see you again in 1 month.       La depresión: Consejos para ayudarse a sí mismo  Mientras ana proveedores de atención médica le verito tratamiento para la depresión, usted también puede ayudarse a sí mismo. Recuerde que tiene j carlos enfermedad que le afecta en el aspecto físico, emocional, mental y social. La recuperación completa puede kay tiempo. Cuide jamison cuerpo y jamison mente, y tenga paciencia consigo mismo mientras se recupera.    Esté con otras personas  No se aísle de los demás. Si lo hace, se sentirá peor. Trate de pasar tiempo en compañía de otras personas y participe en actividades divertidas cuando pueda. Vaya a jose j carlos película, un partido, un servicio religioso o un evento social. Hable francamente con personas de confianza y acepte jamison ayuda cuando se la ofrezcan.  Mantenga j carlos perspectiva cristiana  · La depresión puede ofuscar jamison juicio, por lo que es importante que espere hasta que se sienta mejor antes de kay decisiones importantes, ind[place] un cambio de trabajo, mudanza, matrimonio o divorcio.  · Recuerde que esta enfermedad no es culpa suya. No se gulpe por jamison depresión.  · Recuperarse de j carlos depresión es un proceso que sukhi jamison tiempo. No se desanime si no se siente mejor de inmediato.  · La depresión sherrell energía y dificulta la capacidad de concentración. Por lo tanto, no podrá hacer todas las cosas que hacía antes. Establezca objetivos razonables y natalia lo que pueda para alcanzarlos.  Cuide jamison cuerpo  A menudo, las personas que tienen depresión hill de cuidarse, lo cual empeora aun más ana problemas. Gerald el tratamiento, y después, es importante que recuerde lo siguiente:  · Natalia ejercicio. El ejercicio es j carlos manera excelente de cuidar jamison cuerpo y los estudios  médicos indican que también ayuda a combatir la depresión.  · Evite las drogas y el alcohol. Es posible que le proporcionen alivio a corto plazo peterson, a kevin plazo, simplemente agravarán ana problemas.  · Trate de reducir el estrés. Pida a jamison proveedor de atención médica que le recomiende ejercicios y técnicas de relajación para aliviar el estrés.  · Coma j carlos dieta сергей y rajesh equilibrada para ayudar a mantener la bethanie de jamison cuerpo.  Date Last Reviewed: 1/19/2015  © 0070-3343 [employer], [employer]. [address] [place] [address], [place] [address]. Todos los derechos reservados. Esta información no pretende sustituir la atención médica profesional. Sólo jamison médico puede diagnosticar y tratar un problema de bethanie.

## 2017-09-18 RX ORDER — MELOXICAM 15 MG/1
TABLET ORAL
Qty: 30 TABLET | Refills: 0 | OUTPATIENT
Start: 2017-09-18

## 2017-10-05 DIAGNOSIS — F51.04 CHRONIC INSOMNIA: ICD-10-CM

## 2017-10-06 RX ORDER — TRAZODONE HYDROCHLORIDE 150 MG/1
TABLET ORAL
Qty: 30 TABLET | Refills: 11 | Status: SHIPPED | OUTPATIENT
Start: 2017-10-06 | End: 2018-10-03 | Stop reason: SDUPTHER

## 2017-10-17 ENCOUNTER — OFFICE VISIT (OUTPATIENT)
Dept: INTERNAL MEDICINE | Facility: CLINIC | Age: 82
End: 2017-10-17
Payer: COMMERCIAL

## 2017-10-17 ENCOUNTER — LAB VISIT (OUTPATIENT)
Dept: LAB | Facility: HOSPITAL | Age: 82
End: 2017-10-17
Attending: FAMILY MEDICINE
Payer: COMMERCIAL

## 2017-10-17 VITALS
HEART RATE: 54 BPM | BODY MASS INDEX: 31.34 KG/M2 | TEMPERATURE: 97 F | HEIGHT: 69 IN | OXYGEN SATURATION: 97 % | WEIGHT: 211.63 LBS | DIASTOLIC BLOOD PRESSURE: 44 MMHG | SYSTOLIC BLOOD PRESSURE: 126 MMHG

## 2017-10-17 DIAGNOSIS — I83.93 VARICOSE VEINS OF BOTH LOWER EXTREMITIES: ICD-10-CM

## 2017-10-17 DIAGNOSIS — F32.9 MAJOR DEPRESSION, CHRONIC: ICD-10-CM

## 2017-10-17 DIAGNOSIS — F32.9 MAJOR DEPRESSION, CHRONIC: Primary | ICD-10-CM

## 2017-10-17 LAB
ALBUMIN SERPL BCP-MCNC: 3.5 G/DL
ALP SERPL-CCNC: 59 U/L
ALT SERPL W/O P-5'-P-CCNC: 11 U/L
ANION GAP SERPL CALC-SCNC: 6 MMOL/L
AST SERPL-CCNC: 15 U/L
BASOPHILS # BLD AUTO: 0.08 K/UL
BASOPHILS NFR BLD: 1.2 %
BILIRUB SERPL-MCNC: 0.8 MG/DL
BUN SERPL-MCNC: 24 MG/DL
CALCIUM SERPL-MCNC: 9.7 MG/DL
CHLORIDE SERPL-SCNC: 105 MMOL/L
CHOLEST SERPL-MCNC: 154 MG/DL
CHOLEST/HDLC SERPL: 3.1 {RATIO}
CO2 SERPL-SCNC: 28 MMOL/L
CREAT SERPL-MCNC: 1.2 MG/DL
DIFFERENTIAL METHOD: NORMAL
EOSINOPHIL # BLD AUTO: 0.2 K/UL
EOSINOPHIL NFR BLD: 3.1 %
ERYTHROCYTE [DISTWIDTH] IN BLOOD BY AUTOMATED COUNT: 13.7 %
EST. GFR  (AFRICAN AMERICAN): >60 ML/MIN/1.73 M^2
EST. GFR  (NON AFRICAN AMERICAN): 56 ML/MIN/1.73 M^2
GLUCOSE SERPL-MCNC: 94 MG/DL
HCT VFR BLD AUTO: 43.4 %
HDLC SERPL-MCNC: 49 MG/DL
HDLC SERPL: 31.8 %
HGB BLD-MCNC: 14.2 G/DL
IMM GRANULOCYTES # BLD AUTO: 0.01 K/UL
IMM GRANULOCYTES NFR BLD AUTO: 0.1 %
LDLC SERPL CALC-MCNC: 91.8 MG/DL
LYMPHOCYTES # BLD AUTO: 2 K/UL
LYMPHOCYTES NFR BLD: 28.4 %
MCH RBC QN AUTO: 30.5 PG
MCHC RBC AUTO-ENTMCNC: 32.7 G/DL
MCV RBC AUTO: 93 FL
MONOCYTES # BLD AUTO: 0.6 K/UL
MONOCYTES NFR BLD: 8.3 %
NEUTROPHILS # BLD AUTO: 4 K/UL
NEUTROPHILS NFR BLD: 58.9 %
NONHDLC SERPL-MCNC: 105 MG/DL
NRBC BLD-RTO: 0 /100 WBC
PLATELET # BLD AUTO: 198 K/UL
PMV BLD AUTO: 10.4 FL
POTASSIUM SERPL-SCNC: 4.8 MMOL/L
PROT SERPL-MCNC: 6.7 G/DL
RBC # BLD AUTO: 4.66 M/UL
SODIUM SERPL-SCNC: 139 MMOL/L
TRIGL SERPL-MCNC: 66 MG/DL
WBC # BLD AUTO: 6.86 K/UL

## 2017-10-17 PROCEDURE — 99213 OFFICE O/P EST LOW 20 MIN: CPT | Mod: S$GLB,,, | Performed by: FAMILY MEDICINE

## 2017-10-17 PROCEDURE — 80053 COMPREHEN METABOLIC PANEL: CPT

## 2017-10-17 PROCEDURE — 80061 LIPID PANEL: CPT

## 2017-10-17 PROCEDURE — 85025 COMPLETE CBC W/AUTO DIFF WBC: CPT

## 2017-10-17 PROCEDURE — 99999 PR PBB SHADOW E&M-EST. PATIENT-LVL III: CPT | Mod: PBBFAC,,, | Performed by: FAMILY MEDICINE

## 2017-10-17 PROCEDURE — 36415 COLL VENOUS BLD VENIPUNCTURE: CPT

## 2017-10-17 RX ORDER — SERTRALINE HYDROCHLORIDE 100 MG/1
100 TABLET, FILM COATED ORAL DAILY
Qty: 90 TABLET | Refills: 0 | Status: SHIPPED | OUTPATIENT
Start: 2017-10-17 | End: 2018-01-05 | Stop reason: SDUPTHER

## 2017-10-17 NOTE — PROGRESS NOTES
Subjective:       Patient ID: Santos Morales is a 82 y.o. male.    Chief Complaint: Depression (4 wk f/u) and Leg Pain (right leg)    HPI     81 yo M    PMH     Depression, HTN, SAMUEL    Current medicines:  Blood pressure - amlodipine  Zoloft 100mg  Trazadone 1.5 tablets - helping him sleep     Has sleep apnea - does not wear        Follow up for depression.  On 100 mg of zoloft. Increased from 50mg.    Reports no change - no benefit.    States it will not be cured - because has had it all his life.    Lost hearing aid on vacation. Worsened his depression.    Hurts in groin. Painful to touch. Longstanding.  Has hurt for 20 years.    Has varicose veins.    Reports occasional weakness.    Review of Systems   Constitutional: Negative for chills and fever.   Respiratory: Negative for shortness of breath.    Cardiovascular: Negative for chest pain.   Neurological: Negative for dizziness and light-headedness.       Objective:      Physical Exam   Constitutional: He is oriented to person, place, and time. He appears well-developed and well-nourished. No distress.   HENT:   Head: Normocephalic and atraumatic.   Right Ear: Hearing, tympanic membrane, external ear and ear canal normal.   Left Ear: Hearing, tympanic membrane, external ear and ear canal normal.   Nose: Nose normal. Right sinus exhibits no maxillary sinus tenderness and no frontal sinus tenderness. Left sinus exhibits no maxillary sinus tenderness and no frontal sinus tenderness.   Mouth/Throat: Uvula is midline, oropharynx is clear and moist and mucous membranes are normal.   Eyes: Conjunctivae are normal. Right eye exhibits no discharge. Left eye exhibits no discharge.   Neck: Trachea normal, normal range of motion and full passive range of motion without pain.   Cardiovascular: Normal rate, regular rhythm, normal heart sounds and intact distal pulses.    Pulmonary/Chest: Effort normal and breath sounds normal. No respiratory distress. He has no decreased  breath sounds. He has no wheezes.   Abdominal: Soft. Normal appearance and bowel sounds are normal. He exhibits no distension and no mass. There is no tenderness. There is no guarding. No hernia.   Musculoskeletal: Normal range of motion. He exhibits no edema or deformity.   Lymphadenopathy:     He has no cervical adenopathy.   Neurological: He is alert and oriented to person, place, and time.   Skin: Skin is warm, dry and intact. No rash noted. No erythema. No pallor.   Noted varicose veins in both legs.   Psychiatric: He has a normal mood and affect. His speech is normal and behavior is normal. Thought content normal.       Assessment:       1. Major depression, chronic    2. Varicose veins of both lower extremities        Plan:   Major depression, chronic  Increased zoloft 4 weeks ago. Refilled.  Increased trazadone to 1.5 tablets - has helped sleep.    I would like him to see mental health specialist - for depression - preferably one who speaks Afghan.    Communication is very difficult given language barrier. Have used  services in past. Sending to establish care with Afghan speaking provider given my concern that poor communication is not optimizing patient's care.    Patient refuses to see mental health specialist.  Saw some in [place] many decades ago - did nothing for him.    I believe depression is worsened by inability to hear - lost hearing aid on trip to [place].    CBC, CMP, Lipid.          Varicose veins of both lower extremities  Compression  Education    Follow up in 3 months with [place] Speaking Physician.  Attempted several calls trying to find Afghan speaking mental health specialist              No Follow-up on file.

## 2017-10-17 NOTE — PATIENT INSTRUCTIONS
Cuidado personal de la araña vascular y las varices  Jamison proveedor de atención médica podrá sugerirle que intente el cuidado personal. El ejercicio y el mantenimiento de un peso saludable pueden evitar que empeoren las varices. Ponerse medias elásticas y mantener las piernas elevadas ayuda a mejorar la circulación de la suzette. Erik descansos al estar sentado o de pie también puede ser útil.  Ejercicio  El ejercicio es pereira para las venas varicosas porque mejora la circulación de la suzette. Caminar, montar en bicicleta o nadar son ejercicios para mantener sanas las venas. Asegúrese de consultar con jamison médico antes comenzar un programa de ejercicio. Recuerde esto:  · Al hacer ejercicio, comience despacio y vaya aumentando gradualmente hasta llegar a hacer 30 minutos la mayoría de los días.  · Eleve las piernas por encima del nivel del corazón después de hacer ejercicio para evitar que la suzette se estanque en las venas.  Cómo mantener un peso saludable  El sobrepeso ejerce demasiada presión sobre las venas. Para mantener un peso saludable, intente estos consejos:  · Elija rich magras, pescado y sujata sin piel.  · Compre productos lácteos bajos en grasa.  · Elija comidas con alto contenido de fibra, india cereales de grano integral, frutas y verduras.  · Reduzca el consumo de azúcar, sal y las grasas saturadas y trans  · Natalia ejercicio con regularidad.  Uso de las medias elásticas de compresión  Las medias elásticas ejercen j carlos presión suave sobre las venas y ayudan a la suzette a fluir hacia arriba. Si necesita medias elásticas, jamison proveedor de atención médica se las recomendará. Siga las indicaciones del proveedor de atención médica acerca de la forma y frecuencia de uso. Pregúntele a jamison proveedor de atención médica qué talbert ajustadas deben quedarle las medias elásticas para que le brinden el mayor beneficio.  Eleve las piernas  Elevar las piernas por encima del nivel del corazón le ayudará a aliviar la hinchazón  y a evitar que la suzette se estanque en las venas. Intente elevar las piernas joyce 15-20 minutos al final del día, y siempre que se esté relajando. Para elevar las piernas por encima del nivel del corazón, apóyelas sobre cojines o almohadas grandes.     Al estar parado, póngase de puntillas elevando y bajando los talones.        El borde superior de las medias elásticas debe quedar por debajo de la rodilla para un ajuste adecuado.   Al estar sentado o de pie  Para mantener la suzette circulando cuando tiene que permanecer sentado o parado joyce largos períodos, pruebe estos consejos:  · En el trabajo, salga a caminar joyce los descansos, en vez de kay café. Camine también joyce la hora del almuerzo. O rajesh intente flexionar los pies hacia arriba y hacia abajo 10 veces cada hora.  · Al estar de pie, póngase de puntillas elevando y bajando los talones, o rajesh balancéese sobre los talones hacia adelante y hacia atrás.  Date Last Reviewed: 5/1/2016  © 4842-2382 [employer], [employer]. [address] [place] [address]. Todos los derechos reservados. Esta información no pretende sustituir la atención médica profesional. Sólo jamison médico puede diagnosticar y tratar un problema de bethanie.

## 2017-10-23 ENCOUNTER — TELEPHONE (OUTPATIENT)
Dept: INTERNAL MEDICINE | Facility: CLINIC | Age: 82
End: 2017-10-23

## 2017-10-23 NOTE — TELEPHONE ENCOUNTER
----- Message from Warner Santacruz MD sent at 10/19/2017  1:02 PM CDT -----  Please call the patient regarding his abnormal result  Lab of note - renal function - slightly diminished as compared to recent. Advise him to avoid taking Mobic or other anti-inflammatories - tylenol ok. If shoulder pain is bad - he should see ortho again.

## 2017-10-23 NOTE — TELEPHONE ENCOUNTER
Notified pts wife, Kameron, of results. She verbalized understanding and states he is feeling better.

## 2017-12-15 ENCOUNTER — OFFICE VISIT (OUTPATIENT)
Dept: FAMILY MEDICINE | Facility: CLINIC | Age: 82
End: 2017-12-15
Payer: COMMERCIAL

## 2017-12-15 VITALS
SYSTOLIC BLOOD PRESSURE: 130 MMHG | BODY MASS INDEX: 32.03 KG/M2 | OXYGEN SATURATION: 95 % | HEIGHT: 69 IN | DIASTOLIC BLOOD PRESSURE: 64 MMHG | HEART RATE: 58 BPM | TEMPERATURE: 97 F | WEIGHT: 216.25 LBS

## 2017-12-15 DIAGNOSIS — E66.9 OBESITY, UNSPECIFIED CLASSIFICATION, UNSPECIFIED OBESITY TYPE, UNSPECIFIED WHETHER SERIOUS COMORBIDITY PRESENT: ICD-10-CM

## 2017-12-15 DIAGNOSIS — G47.30 SLEEP APNEA, UNSPECIFIED TYPE: ICD-10-CM

## 2017-12-15 DIAGNOSIS — I10 HYPERTENSION, UNSPECIFIED TYPE: ICD-10-CM

## 2017-12-15 DIAGNOSIS — F33.41 RECURRENT MAJOR DEPRESSIVE DISORDER, IN PARTIAL REMISSION: Primary | ICD-10-CM

## 2017-12-15 DIAGNOSIS — M75.101 TEAR OF RIGHT ROTATOR CUFF, UNSPECIFIED TEAR EXTENT: ICD-10-CM

## 2017-12-15 PROCEDURE — 99999 PR PBB SHADOW E&M-EST. PATIENT-LVL III: CPT | Mod: PBBFAC,,, | Performed by: FAMILY MEDICINE

## 2017-12-15 PROCEDURE — 99214 OFFICE O/P EST MOD 30 MIN: CPT | Mod: S$GLB,,, | Performed by: FAMILY MEDICINE

## 2017-12-15 NOTE — LETTER
December 15, 2017      Warner Santacruz MD  [address] [facility] [address]           [address] [address]  Phone: [phone]  Fax: [phone]          Patient: Santos Morales   MR Number: [id number]   YOB: 1935   Date of Visit: 12/15/2017       Dear Dr. Warner Santacruz:    Thank you for referring Santos Morales to me for evaluation. Attached you will find relevant portions of my assessment and plan of care.    If you have questions, please do not hesitate to call me. I look forward to following Santos Morales along with you.    Sincerely,    Diane Bowen MD    Enclosure  CC:  No Recipients    If you would like to receive this communication electronically, please contact [email or web address] or [phone] to request more information on [employer] Link access.    For providers and/or their staff who would like to refer a patient to [facility], please contact us through our one-stop-shop provider referral line, [facility] , at [phone].    If you feel you have received this communication in error or would no longer like to receive these types of communications, please e-mail [email or web address]

## 2017-12-15 NOTE — PROGRESS NOTES
Subjective:       Patient ID: Santos Morales is a 82 y.o. male.    Chief Complaint: Establish Care    82 y old male with depression , htn , obesity , sleep apnea  and R  rotator cuff tear   here to get established.He has had depression all his life . He has been seen by multiple Psych . He feels stable at the moment . No  Psych admission , no suicidal thoughts, nor  attempts . Refuses seeing  Psych now . Not exercising . He has daily r shoulder pain . He did not do PT when prescribed by ortho . He was also offered  surgery but he declined. He doesn't  wear CPAP . IT is  too uncomfortable       Review of Systems   Constitutional: Negative.    HENT: Negative.    Respiratory: Negative.    Cardiovascular: Negative.    Gastrointestinal: Negative.    Genitourinary: Negative.    Musculoskeletal: Negative.    Hematological: Negative.    Psychiatric/Behavioral: Negative.        Objective:      Physical Exam   Constitutional: He is oriented to person, place, and time. He appears well-developed and well-nourished. No distress.   HENT:   Head: Normocephalic and atraumatic.   Right Ear: External ear normal.   Left Ear: External ear normal.   Nose: Nose normal.   Mouth/Throat: No oropharyngeal exudate.   Eyes: Conjunctivae and EOM are normal. Pupils are equal, round, and reactive to light. Right eye exhibits no discharge. Left eye exhibits no discharge. No scleral icterus.   Neck: Normal range of motion. Neck supple. No JVD present. No tracheal deviation present. No thyromegaly present.   Cardiovascular: Normal rate, regular rhythm, normal heart sounds and intact distal pulses.  Exam reveals no gallop and no friction rub.    No murmur heard.  Pulmonary/Chest: Effort normal and breath sounds normal. No stridor. No respiratory distress. He has no wheezes. He has no rales. He exhibits no tenderness.   Abdominal: Soft. Bowel sounds are normal. He exhibits no distension. There is no tenderness. There is no rebound and no guarding.    Musculoskeletal: He exhibits no edema.        Right shoulder: He exhibits decreased range of motion, tenderness and bony tenderness.   Lymphadenopathy:     He has no cervical adenopathy.   Neurological: He is alert and oriented to person, place, and time. He has normal reflexes. He displays normal reflexes. No cranial nerve deficit. He exhibits normal muscle tone. Coordination normal.   Skin: Skin is warm and dry. No rash noted. He is not diaphoretic. No erythema. No pallor.   Psychiatric: He has a normal mood and affect. His behavior is normal. Judgment and thought content normal.       Assessment:       1. Recurrent major depressive disorder, in partial remission    2. Tear of right rotator cuff, unspecified tear extent    3. Hypertension, unspecified type    4. Obesity, unspecified classification, unspecified obesity type, unspecified whether serious comorbidity present        Plan:     Santos was seen today for establish care.    Diagnoses and all orders for this visit:    Recurrent major depressive disorder, in partial remission    Tear of right rotator cuff, unspecified tear extent    Hypertension, unspecified type    Obesity, unspecified classification, unspecified obesity type, unspecified whether serious comorbidity present     Stable . cont med Will contemplate psych ref   reconsider PT   Controlled. Cont med   The patient is asked to make an attempt to improve diet and exercise patterns to aid in medical management of this problem.

## 2018-01-04 DIAGNOSIS — F41.9 ANXIETY: ICD-10-CM

## 2018-01-05 DIAGNOSIS — F32.9 MAJOR DEPRESSION, CHRONIC: ICD-10-CM

## 2018-01-05 RX ORDER — SERTRALINE HYDROCHLORIDE 100 MG/1
TABLET, FILM COATED ORAL
Qty: 90 TABLET | Refills: 0 | Status: SHIPPED | OUTPATIENT
Start: 2018-01-05 | End: 2018-03-03 | Stop reason: SDUPTHER

## 2018-01-05 RX ORDER — CLONAZEPAM 0.5 MG/1
TABLET ORAL
Qty: 30 TABLET | Refills: 5 | Status: SHIPPED | OUTPATIENT
Start: 2018-01-05 | End: 2018-07-24 | Stop reason: SDUPTHER

## 2018-03-03 DIAGNOSIS — F32.9 MAJOR DEPRESSION, CHRONIC: ICD-10-CM

## 2018-03-05 RX ORDER — SERTRALINE HYDROCHLORIDE 100 MG/1
TABLET, FILM COATED ORAL
Qty: 90 TABLET | Refills: 0 | Status: SHIPPED | OUTPATIENT
Start: 2018-03-05 | End: 2018-07-12 | Stop reason: SDUPTHER

## 2018-06-10 DIAGNOSIS — I10 ESSENTIAL HYPERTENSION: ICD-10-CM

## 2018-06-11 RX ORDER — AMLODIPINE BESYLATE 5 MG/1
TABLET ORAL
Qty: 30 TABLET | Refills: 9 | OUTPATIENT
Start: 2018-06-11

## 2018-07-12 DIAGNOSIS — F41.9 ANXIETY: ICD-10-CM

## 2018-07-12 DIAGNOSIS — F32.9 MAJOR DEPRESSION, CHRONIC: ICD-10-CM

## 2018-07-12 DIAGNOSIS — I10 ESSENTIAL HYPERTENSION: ICD-10-CM

## 2018-07-13 RX ORDER — AMLODIPINE BESYLATE 5 MG/1
TABLET ORAL
Qty: 30 TABLET | Refills: 9 | OUTPATIENT
Start: 2018-07-13

## 2018-07-13 RX ORDER — CLONAZEPAM 0.5 MG/1
TABLET ORAL
Qty: 30 TABLET | Refills: 5 | OUTPATIENT
Start: 2018-07-13

## 2018-07-13 RX ORDER — SERTRALINE HYDROCHLORIDE 100 MG/1
TABLET, FILM COATED ORAL
Qty: 90 TABLET | Refills: 0 | Status: SHIPPED | OUTPATIENT
Start: 2018-07-13 | End: 2018-10-12 | Stop reason: SDUPTHER

## 2018-07-18 ENCOUNTER — PATIENT MESSAGE (OUTPATIENT)
Dept: FAMILY MEDICINE | Facility: CLINIC | Age: 83
End: 2018-07-18

## 2018-07-20 ENCOUNTER — TELEPHONE (OUTPATIENT)
Dept: FAMILY MEDICINE | Facility: CLINIC | Age: 83
End: 2018-07-20

## 2018-07-20 NOTE — TELEPHONE ENCOUNTER
----- Message from Vincent Cano sent at 7/20/2018  8:39 AM CDT -----  Contact: marco Lewis       [facility]     [phone]   Fax [phone]

## 2018-07-23 DIAGNOSIS — I10 ESSENTIAL HYPERTENSION: ICD-10-CM

## 2018-07-23 RX ORDER — AMLODIPINE BESYLATE 5 MG/1
5 TABLET ORAL DAILY
Qty: 90 TABLET | Refills: 0 | Status: SHIPPED | OUTPATIENT
Start: 2018-07-23 | End: 2018-08-24 | Stop reason: SDUPTHER

## 2018-07-24 ENCOUNTER — HOSPITAL ENCOUNTER (OUTPATIENT)
Dept: RADIOLOGY | Facility: HOSPITAL | Age: 83
Discharge: HOME | End: 2018-07-24
Attending: FAMILY MEDICINE
Payer: COMMERCIAL

## 2018-07-24 ENCOUNTER — OFFICE VISIT (OUTPATIENT)
Dept: FAMILY MEDICINE | Facility: CLINIC | Age: 83
End: 2018-07-24
Payer: COMMERCIAL

## 2018-07-24 VITALS
HEART RATE: 60 BPM | SYSTOLIC BLOOD PRESSURE: 132 MMHG | BODY MASS INDEX: 30.64 KG/M2 | DIASTOLIC BLOOD PRESSURE: 64 MMHG | TEMPERATURE: 97 F | HEIGHT: 69 IN | WEIGHT: 206.88 LBS | OXYGEN SATURATION: 96 %

## 2018-07-24 DIAGNOSIS — R41.3 MEMORY LOSS: ICD-10-CM

## 2018-07-24 DIAGNOSIS — F41.9 ANXIETY: ICD-10-CM

## 2018-07-24 DIAGNOSIS — R41.3 MEMORY LOSS: Primary | ICD-10-CM

## 2018-07-24 LAB
AMORPH CRY UR QL COMP ASSIST: ABNORMAL
BACTERIA #/AREA URNS AUTO: ABNORMAL /HPF
BILIRUB UR QL STRIP: NEGATIVE
CLARITY UR REFRACT.AUTO: ABNORMAL
COLOR UR AUTO: YELLOW
GLUCOSE UR QL STRIP: NEGATIVE
HGB UR QL STRIP: NEGATIVE
HYALINE CASTS UR QL AUTO: 0 /LPF
KETONES UR QL STRIP: NEGATIVE
LEUKOCYTE ESTERASE UR QL STRIP: NEGATIVE
MICROSCOPIC COMMENT: ABNORMAL
NITRITE UR QL STRIP: NEGATIVE
PH UR STRIP: 5 [PH] (ref 5–8)
PROT UR QL STRIP: ABNORMAL
RBC #/AREA URNS AUTO: 0 /HPF (ref 0–4)
SP GR UR STRIP: 1.03 (ref 1–1.03)
SQUAMOUS #/AREA URNS AUTO: 2 /HPF
URN SPEC COLLECT METH UR: ABNORMAL
UROBILINOGEN UR STRIP-ACNC: 2 EU/DL
WBC #/AREA URNS AUTO: 0 /HPF (ref 0–5)

## 2018-07-24 PROCEDURE — 71046 X-RAY EXAM CHEST 2 VIEWS: CPT | Mod: 26,,, | Performed by: RADIOLOGY

## 2018-07-24 PROCEDURE — 99999 PR PBB SHADOW E&M-EST. PATIENT-LVL IV: CPT | Mod: PBBFAC,,, | Performed by: FAMILY MEDICINE

## 2018-07-24 PROCEDURE — 81001 URINALYSIS AUTO W/SCOPE: CPT

## 2018-07-24 PROCEDURE — 3078F DIAST BP <80 MM HG: CPT | Mod: CPTII,S$GLB,, | Performed by: FAMILY MEDICINE

## 2018-07-24 PROCEDURE — 71046 X-RAY EXAM CHEST 2 VIEWS: CPT | Mod: TC,PO

## 2018-07-24 PROCEDURE — 3075F SYST BP GE 130 - 139MM HG: CPT | Mod: CPTII,S$GLB,, | Performed by: FAMILY MEDICINE

## 2018-07-24 PROCEDURE — 99214 OFFICE O/P EST MOD 30 MIN: CPT | Mod: S$GLB,,, | Performed by: FAMILY MEDICINE

## 2018-07-24 RX ORDER — CLONAZEPAM 0.5 MG/1
0.5 TABLET ORAL NIGHTLY
Qty: 30 TABLET | Refills: 2 | Status: SHIPPED | OUTPATIENT
Start: 2018-07-24 | End: 2018-10-12 | Stop reason: SDUPTHER

## 2018-07-24 NOTE — PROGRESS NOTES
Subjective:       Patient ID: Santos Morales is a 83 y.o. male.    Chief Complaint: Medication Refill and Follow-up    83 y old male with depression and anxiety here for f.u . Doing fair . Gets melancholic once  in a while . Non suicidal . Wife has noted memory loss: forgets what he supposed to get from room , had also forgotten that he came to this clinic to see us last December . No confusion . Independent with ADL . Had issues with alcohol before. He binges sometimes       Medication Refill       Review of Systems   Constitutional: Negative.    HENT: Negative.    Eyes: Negative.    Respiratory: Negative.    Cardiovascular: Negative.    Gastrointestinal: Negative.    Genitourinary: Negative.    Musculoskeletal: Negative.    Skin: Negative.    Neurological: Negative.    Hematological: Negative.    Psychiatric/Behavioral: Negative.        Objective:      Physical Exam   Constitutional: He is oriented to person, place, and time. He appears well-developed and well-nourished. No distress.   HENT:   Head: Normocephalic and atraumatic.   Right Ear: External ear normal.   Left Ear: External ear normal.   Nose: Nose normal.   Mouth/Throat: No oropharyngeal exudate.   Eyes: Conjunctivae and EOM are normal. Pupils are equal, round, and reactive to light. Right eye exhibits no discharge. Left eye exhibits no discharge. No scleral icterus.   Neck: Normal range of motion. Neck supple. No JVD present. No tracheal deviation present. No thyromegaly present.   Cardiovascular: Normal rate, regular rhythm, normal heart sounds and intact distal pulses.  Exam reveals no gallop and no friction rub.    No murmur heard.  Pulmonary/Chest: Effort normal and breath sounds normal. No stridor. No respiratory distress. He has no wheezes. He has no rales. He exhibits no tenderness.   Abdominal: Soft. Bowel sounds are normal. He exhibits no distension. There is no tenderness. There is no rebound and no guarding.   Musculoskeletal: Normal range  of motion. He exhibits no edema or tenderness.   Lymphadenopathy:     He has no cervical adenopathy.   Neurological: He is alert and oriented to person, place, and time. He has normal reflexes. He displays normal reflexes. No cranial nerve deficit. He exhibits normal muscle tone. Coordination normal.   Skin: Skin is warm and dry. No rash noted. He is not diaphoretic. No erythema. No pallor.   Psychiatric: He has a normal mood and affect. His behavior is normal. Judgment and thought content normal.       Assessment:       1. Memory loss    2. Anxiety        Plan:     Santos was seen today for medication refill and follow-up.    Diagnoses and all orders for this visit:    Memory loss  -     TSH; Future  -     Vitamin B12; Future  -     Folate; Future  -     CBC auto differential; Future  -     Comprehensive metabolic panel; Future  -     Urinalysis  -     X-Ray Chest PA And Lateral; Future    Anxiety  -     clonazePAM (KLONOPIN) 0.5 MG tablet; Take 1 tablet (0.5 mg total) by mouth every evening.     Mini cog = 2

## 2018-07-25 ENCOUNTER — TELEPHONE (OUTPATIENT)
Dept: FAMILY MEDICINE | Facility: CLINIC | Age: 83
End: 2018-07-25

## 2018-07-25 DIAGNOSIS — N17.9 AKI (ACUTE KIDNEY INJURY): Primary | ICD-10-CM

## 2018-08-23 ENCOUNTER — OFFICE VISIT (OUTPATIENT)
Dept: NEPHROLOGY | Facility: CLINIC | Age: 83
End: 2018-08-23
Payer: COMMERCIAL

## 2018-08-23 VITALS
SYSTOLIC BLOOD PRESSURE: 138 MMHG | WEIGHT: 208.56 LBS | BODY MASS INDEX: 31.61 KG/M2 | DIASTOLIC BLOOD PRESSURE: 48 MMHG | HEIGHT: 68 IN | HEART RATE: 55 BPM

## 2018-08-23 DIAGNOSIS — R80.9 PROTEINURIA, UNSPECIFIED TYPE: ICD-10-CM

## 2018-08-23 DIAGNOSIS — N18.30 CKD (CHRONIC KIDNEY DISEASE) STAGE 3, GFR 30-59 ML/MIN: Primary | ICD-10-CM

## 2018-08-23 PROCEDURE — 3075F SYST BP GE 130 - 139MM HG: CPT | Mod: CPTII,S$GLB,, | Performed by: INTERNAL MEDICINE

## 2018-08-23 PROCEDURE — 3078F DIAST BP <80 MM HG: CPT | Mod: CPTII,S$GLB,, | Performed by: INTERNAL MEDICINE

## 2018-08-23 PROCEDURE — 99205 OFFICE O/P NEW HI 60 MIN: CPT | Mod: S$GLB,,, | Performed by: INTERNAL MEDICINE

## 2018-08-23 PROCEDURE — 99999 PR PBB SHADOW E&M-EST. PATIENT-LVL III: CPT | Mod: PBBFAC,,, | Performed by: INTERNAL MEDICINE

## 2018-08-23 NOTE — LETTER
August 23, 2018      Diane Bowen MD  [address] [facility] [address]           [facility]  [address] [place] [address]  Phone: [phone]  Fax: [phone]          Patient: Santos Morales   MR Number: [id number]   YOB: 1935   Date of Visit: 8/23/2018       Dear Dr. Diane Bowen:    Thank you for referring Santos Morales to me for evaluation. Attached you will find relevant portions of my assessment and plan of care.    If you have questions, please do not hesitate to call me. I look forward to following Santos Morales along with you.    Sincerely,    Hari Gomes MD    Enclosure  CC:  No Recipients    If you would like to receive this communication electronically, please contact [email or web address] or [phone] to request more information on [employer] Link access.    For providers and/or their staff who would like to refer a patient to [facility], please contact us through our one-stop-shop provider referral line, [facility], at [phone].    If you feel you have received this communication in error or would no longer like to receive these types of communications, please e-mail [email or web address]

## 2018-08-23 NOTE — PROGRESS NOTES
NEPHROLOGY CONSULTATION    PHYSICIAN REQUESTING THE CONSULT: Dr. Diane Bowen    REASON FOR CONSULTATION: Renal insufficiency    83 y.o. male with history of sleep apnea, HTN, depression, Moebitz I AV block presents to the renal clinic for evaluation of renal insufficiency. A consultation has been requested by the patient's PMD, Dr. Diane Bowen. Patient today presents to the clinic complaining of intermittent headaches, mild ankle swelling and right shoulder pain. He denies any congenital hearing loss, chest pain, SOB, hemoptysis, abdominal or flank pain, diarrhea, nausea/vomiting, hematuria, dysuria, rashes, hand/foot paresthesia, nasal congestion. Patient reports moderate NSAID use history. He has been taking Naprosyn and Mobic about once per week for right shoulder pain.   Patient has a longstanding history of HTN that was diagnosed at least 3 years ago.  BP is currently well controlled at 138/48. In addition, patient reports history of sleep apnea. He denies any history of nephrolithiasis, DM2, heart disease (CAD, CHF). Patient consumes alcohol (beer and liquor) on regular basis (at least 1-2 x per week).  Patient denies any history of renal disease in his family. Laboratory review revealed that the patient's renal function has been mildly affected with least creatinine at 1.5 (7/24/18). Urinalysis from 7/24/18 also revealed +2 proteinuria.     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 Hardy, Cardiology    SAMUEL (obstructive sleep apnea)        Past Surgical History:   Procedure Laterality Date    BACK SURGERY      CATARACT EXTRACTION W/  INTRAOCULAR LENS IMPLANT Bilateral        Review of patient's allergies indicates:  No Known Allergies    Current Outpatient Medications   Medication Sig Dispense Refill    amLODIPine (NORVASC) 5 MG tablet Take 1 tablet (5 mg total) by  mouth once daily. 90 tablet 0    aspirin 81 MG Chew Take 81 mg by mouth once daily.      clonazePAM (KLONOPIN) 0.5 MG tablet Take 1 tablet (0.5 mg total) by mouth every evening. 30 tablet 2    sertraline (ZOLOFT) 100 MG tablet TAKE 1 TABLET BY MOUTH ONCE DAILY 90 tablet 0    trazodone (DESYREL) 150 MG tablet TAKE ONE TABLET BY MOUTH ONCE DAILY IN THE EVENING 30 tablet 11     No current facility-administered medications for this visit.        Family History   Problem Relation Age of Onset    Cancer Father         Stomach Ca     Diabetes Paternal Grandmother        Social History     Socioeconomic History    Marital status:      Spouse name: Not on file    Number of children: Not on file    Years of education: Not on file    Highest education level: Not on file   Social Needs    Financial resource strain: Not on file    Food insecurity - worry: Not on file    Food insecurity - inability: Not on file    Transportation needs - medical: Not on file    Transportation needs - non-medical: Not on file   Occupational History    Not on file   Tobacco Use    Smoking status: Never Smoker    Smokeless tobacco: Never Used   Substance and Sexual Activity    Alcohol use: Yes     Comment: socially     Drug use: Not on file    Sexual activity: Not on file   Other Topics Concern    Not on file   Social History Narrative    Not on file       Review of Systems:  1. GENERAL: patient denies any fever, weight changes, generalized weakness, dizziness.  2. HEENT: patient reports intermittent headaches, no visual disturbances, swallowing problems, sinus pain, nasal congestion.  3. CARDIOVASCULAR: patient denies chest pain, palpitations.  4. PULMONARY: patient denies SOB, coughing, hemoptysis, wheezing.  5. GASTROINTESTINAL: patient denies abdominal pain, nausea, vomiting, diarrhea.  6. GENITOURINARY: patient denies dysuria, hematuria, hesitancy, frequency.  7. EXTREMITIES: patient reports ankle edema, no LE  "cramping.  8. DERMATOLOGY: patient denies rashes, ulcers.  9. NEURO: patient denies tremors, extremity weakness, extremity numbness/tingling.  10. MUSCULOSKELETAL: patient reports right shoulder pain  11. HEMATOLOGY: patient denies rectal or gum bleeding.  12: PSYCH: patient denies anxiety, depression.      PHYSICAL EXAM:  BP (!) 138/48   Pulse (!) 55   Ht 5' 8" (1.727 m)   Wt 94.6 kg (208 lb 8.9 oz)   BMI 31.71 kg/m²     GENERAL: Pleasant gentleman presents to clinic with non-labored breathing.  HEENT: PER, no nasal discharge, no icterus, no oral exudates, moist mucosal membranes.  NECK: no thyroid mass, no lymphadenopathy.  HEART: RRR S1/S2, no rubs, good peripheral pulses.  LUNGS: CTA bilaterally, no wheezing, breathing is nonlabored.  ABDOMEN: soft, nontender, not distended, bowel sounds are present, ventral hernia present  EXTREM: no LE edema.  SKIN: no rashes, skin is warm and dry.  NEURO: A & O x 3, no obvious focal signs.    LABORATORY RESULTS:    Lab Results   Component Value Date    CREATININE 1.5 (H) 07/24/2018    BUN 33 (H) 07/24/2018     07/24/2018    K 4.7 07/24/2018     07/24/2018    CO2 27 07/24/2018      Lab Results   Component Value Date    CALCIUM 9.8 07/24/2018     Lab Results   Component Value Date    ALBUMIN 3.8 07/24/2018     Lab Results   Component Value Date    WBC 7.50 07/24/2018    HGB 13.7 (L) 07/24/2018    HCT 41.8 07/24/2018    MCV 93 07/24/2018     07/24/2018     Urinalysis: + 2 protein (7/24/18)      ASSESSMENT AND PLAN:  83 y.o. male with history of sleep apnea, HTN, depression, Moebitz I AV block presents to the renal clinic for evaluation of renal insufficiency.    1. Renal insufficiency: Patient presents with mild renal insufficiency, consistent with CKD stage 3. Urinalysis from 7/24/18 also revealed +2 protein. Cause of his renal insufficiency is not clear at present but HTN and NSAID use are on differential. Patient's renal function will be monitored " closely and he will return to the clinic in 3 weeks for follow up. I will order a renal panel, CBC, urinalysis, protein creatinine ratio, PTH prior to his return visit. Patient may benefit from ACE-I/ARB. Patient was advised to avoid NSAID pain medications such as advil, aleve, motrin, ibuprofen, naprosyn, meloxicam, diclofenac, mobic and  to hydrate with 60-65 ounces of water per day.     2. Electrolytes: Within normal limits.    3. Acid base status: No acute issues.    4. Volume: Euvolemic.     5. Hypertension: Good BP control.     6. Medications: Reviewed. Patient was advised to avoid NSAID pain medications such as advil, aleve, motrin, ibuprofen, naprosyn, meloxicam, diclofenac, mobic.         Thank you very much for this consult. Please see my note in Epic for recommendations.    Total time spent was about 45 min. 50 % or more was spend on counseling about treatment options disease etiology. Level 5 consult.

## 2018-08-23 NOTE — PATIENT INSTRUCTIONS
Avoid NSAID pain medications such as advil, aleve, motrin, ibuprofen, naprosyn, meloxicam, diclofenac, mobic.       Hydrate with 60-80 ounces of water per day.

## 2018-08-24 DIAGNOSIS — I10 ESSENTIAL HYPERTENSION: ICD-10-CM

## 2018-08-24 RX ORDER — AMLODIPINE BESYLATE 5 MG/1
TABLET ORAL
Qty: 90 TABLET | Refills: 0 | Status: SHIPPED | OUTPATIENT
Start: 2018-08-24 | End: 2018-10-12 | Stop reason: SDUPTHER

## 2018-10-03 DIAGNOSIS — F51.04 CHRONIC INSOMNIA: ICD-10-CM

## 2018-10-04 RX ORDER — TRAZODONE HYDROCHLORIDE 150 MG/1
TABLET ORAL
Qty: 30 TABLET | Refills: 11 | Status: SHIPPED | OUTPATIENT
Start: 2018-10-04 | End: 2019-08-29 | Stop reason: SDUPTHER

## 2018-10-10 ENCOUNTER — LAB VISIT (OUTPATIENT)
Dept: LAB | Facility: HOSPITAL | Age: 83
End: 2018-10-10
Attending: INTERNAL MEDICINE
Payer: COMMERCIAL

## 2018-10-10 DIAGNOSIS — N18.30 CKD (CHRONIC KIDNEY DISEASE) STAGE 3, GFR 30-59 ML/MIN: ICD-10-CM

## 2018-10-10 LAB
ALBUMIN SERPL BCP-MCNC: 3.4 G/DL
ANION GAP SERPL CALC-SCNC: 5 MMOL/L
BUN SERPL-MCNC: 19 MG/DL
CALCIUM SERPL-MCNC: 8.9 MG/DL
CHLORIDE SERPL-SCNC: 108 MMOL/L
CO2 SERPL-SCNC: 25 MMOL/L
CREAT SERPL-MCNC: 1.1 MG/DL
EST. GFR  (AFRICAN AMERICAN): >60 ML/MIN/1.73 M^2
EST. GFR  (NON AFRICAN AMERICAN): >60 ML/MIN/1.73 M^2
GLUCOSE SERPL-MCNC: 96 MG/DL
PHOSPHATE SERPL-MCNC: 2.1 MG/DL
POTASSIUM SERPL-SCNC: 4.4 MMOL/L
SODIUM SERPL-SCNC: 138 MMOL/L

## 2018-10-10 PROCEDURE — 36415 COLL VENOUS BLD VENIPUNCTURE: CPT

## 2018-10-10 PROCEDURE — 80069 RENAL FUNCTION PANEL: CPT

## 2018-10-12 ENCOUNTER — LAB VISIT (OUTPATIENT)
Dept: LAB | Facility: HOSPITAL | Age: 83
End: 2018-10-12
Attending: INTERNAL MEDICINE
Payer: COMMERCIAL

## 2018-10-12 ENCOUNTER — OFFICE VISIT (OUTPATIENT)
Dept: NEPHROLOGY | Facility: CLINIC | Age: 83
End: 2018-10-12
Payer: COMMERCIAL

## 2018-10-12 VITALS
DIASTOLIC BLOOD PRESSURE: 48 MMHG | SYSTOLIC BLOOD PRESSURE: 140 MMHG | BODY MASS INDEX: 32.31 KG/M2 | HEART RATE: 58 BPM | WEIGHT: 213.19 LBS | HEIGHT: 68 IN

## 2018-10-12 DIAGNOSIS — F32.9 MAJOR DEPRESSION, CHRONIC: ICD-10-CM

## 2018-10-12 DIAGNOSIS — R80.9 PROTEINURIA, UNSPECIFIED TYPE: ICD-10-CM

## 2018-10-12 DIAGNOSIS — N18.2 CKD (CHRONIC KIDNEY DISEASE) STAGE 2, GFR 60-89 ML/MIN: Primary | ICD-10-CM

## 2018-10-12 DIAGNOSIS — N18.30 CKD (CHRONIC KIDNEY DISEASE) STAGE 3, GFR 30-59 ML/MIN: ICD-10-CM

## 2018-10-12 DIAGNOSIS — I10 ESSENTIAL HYPERTENSION: ICD-10-CM

## 2018-10-12 DIAGNOSIS — F41.9 ANXIETY: ICD-10-CM

## 2018-10-12 LAB
BILIRUB UR QL STRIP: NEGATIVE
CLARITY UR: CLEAR
COLOR UR: YELLOW
CREAT UR-MCNC: 187 MG/DL
GLUCOSE UR QL STRIP: NEGATIVE
HGB UR QL STRIP: NEGATIVE
KETONES UR QL STRIP: NEGATIVE
LEUKOCYTE ESTERASE UR QL STRIP: NEGATIVE
NITRITE UR QL STRIP: NEGATIVE
PH UR STRIP: 6 [PH] (ref 5–8)
PROT UR QL STRIP: ABNORMAL
PROT UR-MCNC: 29 MG/DL
PROT/CREAT UR: 0.16 MG/G{CREAT}
SP GR UR STRIP: 1.02 (ref 1–1.03)
URN SPEC COLLECT METH UR: ABNORMAL

## 2018-10-12 PROCEDURE — 3077F SYST BP >= 140 MM HG: CPT | Mod: CPTII,S$GLB,, | Performed by: INTERNAL MEDICINE

## 2018-10-12 PROCEDURE — 84156 ASSAY OF PROTEIN URINE: CPT

## 2018-10-12 PROCEDURE — 99214 OFFICE O/P EST MOD 30 MIN: CPT | Mod: S$GLB,,, | Performed by: INTERNAL MEDICINE

## 2018-10-12 PROCEDURE — 3078F DIAST BP <80 MM HG: CPT | Mod: CPTII,S$GLB,, | Performed by: INTERNAL MEDICINE

## 2018-10-12 PROCEDURE — 99999 PR PBB SHADOW E&M-EST. PATIENT-LVL III: CPT | Mod: PBBFAC,,, | Performed by: INTERNAL MEDICINE

## 2018-10-12 PROCEDURE — 1101F PT FALLS ASSESS-DOCD LE1/YR: CPT | Mod: CPTII,S$GLB,, | Performed by: INTERNAL MEDICINE

## 2018-10-12 PROCEDURE — 81003 URINALYSIS AUTO W/O SCOPE: CPT | Mod: PO

## 2018-10-12 RX ORDER — AMLODIPINE BESYLATE 5 MG/1
5 TABLET ORAL DAILY
Qty: 90 TABLET | Refills: 3 | Status: SHIPPED | OUTPATIENT
Start: 2018-10-12 | End: 2019-04-09

## 2018-10-12 RX ORDER — SERTRALINE HYDROCHLORIDE 100 MG/1
100 TABLET, FILM COATED ORAL DAILY
Qty: 90 TABLET | Refills: 3 | Status: SHIPPED | OUTPATIENT
Start: 2018-10-12 | End: 2019-05-14 | Stop reason: SDUPTHER

## 2018-10-12 RX ORDER — CLONAZEPAM 0.5 MG/1
0.5 TABLET ORAL NIGHTLY
Qty: 30 TABLET | Refills: 2 | Status: SHIPPED | OUTPATIENT
Start: 2018-10-12 | End: 2019-01-17 | Stop reason: SDUPTHER

## 2018-10-12 NOTE — PROGRESS NOTES
PROGRESS NOTE FOR ESTABLISHED PATIENT    PHYSICIAN REQUESTING THE CONSULT: [place]. [place]    REASON FOR VISIT: Renal insufficiency    83 y.o. male with history of sleep apnea, HTN, depression, Moebitz I AV block presents to the renal clinic for evaluation of renal insufficiency.     Patient reports intermittent low back pain.      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 Hardy, Cardiology    SAMUEL (obstructive sleep apnea)        Past Surgical History:   Procedure Laterality Date    BACK SURGERY      CATARACT EXTRACTION W/  INTRAOCULAR LENS IMPLANT Bilateral        Review of patient's allergies indicates:  No Known Allergies    Current Outpatient Medications   Medication Sig Dispense Refill    amLODIPine (NORVASC) 5 MG tablet TAKE 1 TABLET BY MOUTH ONCE DAILY 90 tablet 0    aspirin 81 MG Chew Take 81 mg by mouth every other day.       clonazePAM (KLONOPIN) 0.5 MG tablet Take 1 tablet (0.5 mg total) by mouth every evening. 30 tablet 2    ginkgo biloba 120 mg Tab Take by mouth.      sertraline (ZOLOFT) 100 MG tablet TAKE 1 TABLET BY MOUTH ONCE DAILY 90 tablet 0    traZODone (DESYREL) 150 MG tablet TAKE ONE TABLET BY MOUTH ONCE DAILY IN THE EVENING 30 tablet 11     No current facility-administered medications for this visit.        Family History   Problem Relation Age of Onset    Cancer Father         Stomach Ca     Diabetes Paternal Grandmother        Social History     Socioeconomic History    Marital status:      Spouse name: Not on file    Number of children: Not on file    Years of education: Not on file    Highest education level: Not on file   Social Needs    Financial resource strain: Not on file    Food insecurity - worry: Not on file    Food insecurity - inability: Not on file    Transportation needs - medical: Not on file    Transportation needs -  "non-medical: Not on file   Occupational History    Not on file   Tobacco Use    Smoking status: Never Smoker    Smokeless tobacco: Never Used   Substance and Sexual Activity    Alcohol use: Yes     Comment: socially     Drug use: Not on file    Sexual activity: Not on file   Other Topics Concern    Not on file   Social History Narrative    Not on file       Review of Systems:  1. GENERAL: patient denies any fever, weight changes, generalized weakness, dizziness.  2. HEENT: patient denies headaches, no visual disturbances, swallowing problems, sinus pain, nasal congestion.  3. CARDIOVASCULAR: patient denies chest pain, palpitations.  4. PULMONARY: patient denies SOB, coughing, hemoptysis, wheezing.  5. GASTROINTESTINAL: patient denies abdominal pain, nausea, vomiting, diarrhea.  6. GENITOURINARY: patient denies dysuria, hematuria, hesitancy, frequency.  7. EXTREMITIES: patient denies LE edema, no LE cramping.  8. DERMATOLOGY: patient denies rashes, ulcers.  9. NEURO: patient denies tremors, extremity weakness, extremity numbness/tingling.  10. MUSCULOSKELETAL: patient reports intermittent low pain  11. HEMATOLOGY: patient denies rectal or gum bleeding.  12: PSYCH: patient denies anxiety, depression.      PHYSICAL EXAM:  BP (!) 140/48   Pulse (!) 58   Ht 5' 8" (1.727 m)   Wt 96.7 kg (213 lb 3 oz)   BMI 32.41 kg/m²     GENERAL: Pleasant gentleman presents to clinic with non-labored breathing.  HEENT: PER, no nasal discharge, no icterus, no oral exudates, moist mucosal membranes.  NECK: no thyroid mass, no lymphadenopathy.  HEART: RRR S1/S2, no rubs, good peripheral pulses.  LUNGS: CTA bilaterally, no wheezing, breathing is nonlabored.  ABDOMEN: soft, nontender, not distended, bowel sounds are present, ventral hernia present  EXTREM: no LE edema.  SKIN: no rashes, skin is warm and dry.  NEURO: A & O x 3, no obvious focal signs.    LABORATORY RESULTS:    Lab Results   Component Value Date    CREATININE 1.1 " 10/10/2018    BUN 19 10/10/2018     10/10/2018    K 4.4 10/10/2018     10/10/2018    CO2 25 10/10/2018      Lab Results   Component Value Date    CALCIUM 8.9 10/10/2018    PHOS 2.1 (L) 10/10/2018     Lab Results   Component Value Date    ALBUMIN 3.4 (L) 10/10/2018     Lab Results   Component Value Date    WBC 7.50 07/24/2018    HGB 13.7 (L) 07/24/2018    HCT 41.8 07/24/2018    MCV 93 07/24/2018     07/24/2018     Urinalysis: + 2 protein (7/24/18)      ASSESSMENT AND PLAN:  83 y.o. male with history of sleep apnea, HTN, depression, Moebitz I AV block presents to the renal clinic for evaluation of renal insufficiency.    1. Renal insufficiency: Patient presents with mild renal insufficiency, consistent with CKD stage 2. Last creatinine was 1.1 (eGFR > 60). Urinalysis from 7/24/18 also revealed +2 protein. Will follow up with protein creatinine ratio. Patient's renal function will be monitored closely and he will return to the clinic in 4 weeks for follow up.  Patient was advised to avoid NSAID pain medications such as advil, aleve, motrin, ibuprofen, naprosyn, meloxicam, diclofenac, mobic and  to hydrate with 60-80 ounces of water per day.     2. Electrolytes: Mild hypophosphatemia. Monitor for now.     3. Acid base status: No acute issues.    4. Volume: Euvolemic.     5. Hypertension: acceptable BP control.     6. Medications: Reviewed. Patient was advised to avoid NSAID pain medications such as advil, aleve, motrin, ibuprofen, naprosyn, meloxicam, diclofenac, mobic.

## 2018-10-12 NOTE — PATIENT INSTRUCTIONS
Hydrate with 60-80 ounces of water per day.     Avoid NSAID pain medications such as advil, aleve, motrin, ibuprofen, naprosyn, meloxicam, diclofenac, mobic.       Tylenol OK for pain.

## 2018-10-31 ENCOUNTER — HOSPITAL ENCOUNTER (OUTPATIENT)
Dept: RADIOLOGY | Facility: HOSPITAL | Age: 83
Discharge: HOME | End: 2018-10-31
Attending: FAMILY MEDICINE
Payer: COMMERCIAL

## 2018-10-31 ENCOUNTER — OFFICE VISIT (OUTPATIENT)
Dept: FAMILY MEDICINE | Facility: CLINIC | Age: 83
End: 2018-10-31
Payer: COMMERCIAL

## 2018-10-31 VITALS
BODY MASS INDEX: 32.59 KG/M2 | HEART RATE: 53 BPM | TEMPERATURE: 98 F | WEIGHT: 215.06 LBS | SYSTOLIC BLOOD PRESSURE: 165 MMHG | DIASTOLIC BLOOD PRESSURE: 58 MMHG | OXYGEN SATURATION: 95 % | HEIGHT: 68 IN

## 2018-10-31 DIAGNOSIS — R06.02 SOB (SHORTNESS OF BREATH): ICD-10-CM

## 2018-10-31 DIAGNOSIS — R06.02 SOB (SHORTNESS OF BREATH): Primary | ICD-10-CM

## 2018-10-31 PROCEDURE — 71046 X-RAY EXAM CHEST 2 VIEWS: CPT | Mod: TC,PO

## 2018-10-31 PROCEDURE — 99215 OFFICE O/P EST HI 40 MIN: CPT | Mod: S$GLB,,, | Performed by: FAMILY MEDICINE

## 2018-10-31 PROCEDURE — 93005 ELECTROCARDIOGRAM TRACING: CPT | Mod: S$GLB,,, | Performed by: FAMILY MEDICINE

## 2018-10-31 PROCEDURE — 93010 ELECTROCARDIOGRAM REPORT: CPT | Mod: S$GLB,,, | Performed by: INTERNAL MEDICINE

## 2018-10-31 PROCEDURE — 1101F PT FALLS ASSESS-DOCD LE1/YR: CPT | Mod: CPTII,S$GLB,, | Performed by: FAMILY MEDICINE

## 2018-10-31 PROCEDURE — 99999 PR PBB SHADOW E&M-EST. PATIENT-LVL IV: CPT | Mod: PBBFAC,,, | Performed by: FAMILY MEDICINE

## 2018-10-31 PROCEDURE — 3078F DIAST BP <80 MM HG: CPT | Mod: CPTII,S$GLB,, | Performed by: FAMILY MEDICINE

## 2018-10-31 PROCEDURE — 3077F SYST BP >= 140 MM HG: CPT | Mod: CPTII,S$GLB,, | Performed by: FAMILY MEDICINE

## 2018-10-31 PROCEDURE — 71046 X-RAY EXAM CHEST 2 VIEWS: CPT | Mod: 26,,, | Performed by: RADIOLOGY

## 2018-10-31 NOTE — PROGRESS NOTES
Subjective:       Patient ID: Santos Morales is a 83 y.o. male.    Chief Complaint: Shortness of Breath (x 3 days) and Extremity Weakness (bilateral legs)    83 y old male with HTN , stage 3 CKD  With SOB and ONEIL over the last 2 weeks . Some  Non productive cough .  No cp. He was seen by Cardiology years ago and he was told he had a leaky valve(Dr Hardy) . + 6 lbs weight gain ., leg edema.       Review of Systems   Constitutional: Negative.    HENT: Negative.    Eyes: Negative.    Respiratory: Negative.    Cardiovascular: Negative.    Gastrointestinal: Negative.    Genitourinary: Negative.    Musculoskeletal: Negative.    Skin: Negative.    Hematological: Negative.        Objective:      Physical Exam   Constitutional: He is oriented to person, place, and time. He appears well-developed and well-nourished. No distress.   HENT:   Head: Normocephalic and atraumatic.   Right Ear: External ear normal.   Left Ear: External ear normal.   Nose: Nose normal.   Mouth/Throat: No oropharyngeal exudate.   Eyes: Conjunctivae and EOM are normal. Pupils are equal, round, and reactive to light. Right eye exhibits no discharge. Left eye exhibits no discharge. No scleral icterus.   Neck: Normal range of motion. Neck supple. No JVD present. No tracheal deviation present. No thyromegaly present.   Cardiovascular: Intact distal pulses. An irregularly irregular rhythm present. Bradycardia present. Exam reveals no gallop and no friction rub.   Murmur heard.   Systolic murmur is present with a grade of 3/6.  Pulmonary/Chest: Effort normal and breath sounds normal. No stridor. No respiratory distress. He has no wheezes. He has no rales. He exhibits no tenderness.   Abdominal: Soft. Bowel sounds are normal. He exhibits no distension. There is no tenderness. There is no rebound and no guarding.   Musculoskeletal: Normal range of motion. He exhibits no edema or tenderness.   Lymphadenopathy:     He has no cervical adenopathy.   Neurological:  He is alert and oriented to person, place, and time. He has normal reflexes. He displays normal reflexes. No cranial nerve deficit. He exhibits normal muscle tone. Coordination normal.   Skin: Skin is warm and dry. No rash noted. He is not diaphoretic. No erythema. No pallor.   Psychiatric: He has a normal mood and affect. His behavior is normal. Judgment and thought content normal.       Assessment:     Santos was seen today for shortness of breath and extremity weakness.    Diagnoses and all orders for this visit:    SOB (shortness of breath)  -     IN OFFICE EKG 12-LEAD (to Muse)  -     X-Ray Chest PA And Lateral; Future  -     2D Echo w/ Color Flow Doppler; Future  -     Ambulatory consult to Cardiology  -     Complete PFT with bronchodilator; Future  -     CBC auto differential; Future  -     Comprehensive metabolic panel; Future  -     Brain natriuretic peptide; Future      Plan:   Preliminary EKG showed a fib with slow V response  Pt refused ER eval .   AMA form signed   Will keep card noa tomorrow   Be consistent with aspirin   High BP . Will discuss with  Card

## 2018-11-01 ENCOUNTER — TELEPHONE (OUTPATIENT)
Dept: FAMILY MEDICINE | Facility: CLINIC | Age: 83
End: 2018-11-01

## 2018-11-01 ENCOUNTER — LAB VISIT (OUTPATIENT)
Dept: LAB | Facility: HOSPITAL | Age: 83
End: 2018-11-01
Attending: INTERNAL MEDICINE
Payer: COMMERCIAL

## 2018-11-01 ENCOUNTER — OFFICE VISIT (OUTPATIENT)
Dept: CARDIOLOGY | Facility: CLINIC | Age: 83
End: 2018-11-01
Payer: COMMERCIAL

## 2018-11-01 VITALS
HEIGHT: 68 IN | DIASTOLIC BLOOD PRESSURE: 56 MMHG | HEART RATE: 48 BPM | SYSTOLIC BLOOD PRESSURE: 184 MMHG | BODY MASS INDEX: 32.81 KG/M2 | WEIGHT: 216.5 LBS

## 2018-11-01 DIAGNOSIS — I48.0 PAF (PAROXYSMAL ATRIAL FIBRILLATION): ICD-10-CM

## 2018-11-01 DIAGNOSIS — I48.0 PAF (PAROXYSMAL ATRIAL FIBRILLATION): Primary | ICD-10-CM

## 2018-11-01 DIAGNOSIS — N18.30 CKD (CHRONIC KIDNEY DISEASE) STAGE 3, GFR 30-59 ML/MIN: ICD-10-CM

## 2018-11-01 DIAGNOSIS — I10 ESSENTIAL HYPERTENSION: ICD-10-CM

## 2018-11-01 DIAGNOSIS — G47.33 OBSTRUCTIVE SLEEP APNEA SYNDROME: ICD-10-CM

## 2018-11-01 LAB
T4 FREE SERPL-MCNC: 1.01 NG/DL
TSH SERPL DL<=0.005 MIU/L-ACNC: 2.59 UIU/ML

## 2018-11-01 PROCEDURE — 36415 COLL VENOUS BLD VENIPUNCTURE: CPT

## 2018-11-01 PROCEDURE — 84443 ASSAY THYROID STIM HORMONE: CPT

## 2018-11-01 PROCEDURE — 3078F DIAST BP <80 MM HG: CPT | Mod: CPTII,S$GLB,, | Performed by: INTERNAL MEDICINE

## 2018-11-01 PROCEDURE — 3077F SYST BP >= 140 MM HG: CPT | Mod: CPTII,S$GLB,, | Performed by: INTERNAL MEDICINE

## 2018-11-01 PROCEDURE — 99205 OFFICE O/P NEW HI 60 MIN: CPT | Mod: S$GLB,,, | Performed by: INTERNAL MEDICINE

## 2018-11-01 PROCEDURE — 1101F PT FALLS ASSESS-DOCD LE1/YR: CPT | Mod: CPTII,S$GLB,, | Performed by: INTERNAL MEDICINE

## 2018-11-01 PROCEDURE — 84439 ASSAY OF FREE THYROXINE: CPT

## 2018-11-01 PROCEDURE — 99999 PR PBB SHADOW E&M-EST. PATIENT-LVL III: CPT | Mod: PBBFAC,,, | Performed by: INTERNAL MEDICINE

## 2018-11-01 RX ORDER — FUROSEMIDE 40 MG/1
40 TABLET ORAL DAILY PRN
Qty: 90 TABLET | Refills: 3 | Status: SHIPPED | OUTPATIENT
Start: 2018-11-01 | End: 2020-05-30

## 2018-11-01 RX ORDER — LEVOFLOXACIN 750 MG/1
750 TABLET ORAL DAILY
Qty: 10 TABLET | Refills: 0 | Status: SHIPPED | OUTPATIENT
Start: 2018-11-01 | End: 2019-02-19

## 2018-11-01 NOTE — PROGRESS NOTES
Subjective:   Patient ID:  Santos Morales is a 83 y.o. male who presents for cardiac consult of Hypertension and Shortness of Breath      HPI  The patient came in today for cardiac consult of Hypertension and Shortness of Breath    Referring Physician: Diane Bowen MD   Reason for consult: Afib, CHF    11/1/18  This is an 83 year old female pt with newly diagnosed Afib, HTN, SAMUEL, 2nd deg Mobitz 1 AVB, CKD3, SAMUEL, anxiety, depression  presents to establish cardiac care at [facility].     He was at Dr. Bowen's office yesterday and had complained of SOB and ONEIL. ECG revealed new onset Afib, rate controlled in 50s.   Pt had been treated by Dr. Hardy in the past for a valve disorder.   Pt's wife is translating now as he is Taiwanese speaking.  He has been SOB since Saturday. He also has more ONEIL and LE edema. At night he has orthopnea. He bruises easily.   He drinks a lot of alc during weekends. He had CXR with PNA diagnosed. He is also getting PFTs and ECHO soon.     Patient feels no chest pain, no PND, no palpitation, no dizziness, no syncope, no CNS symptoms.    Patient has fairly good exercise tolerance.    Patient is compliant with medications.      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 Hardy, Cardiology    SAMUEL (obstructive sleep apnea)        Past Surgical History:   Procedure Laterality Date    BACK SURGERY      CATARACT EXTRACTION W/  INTRAOCULAR LENS IMPLANT Bilateral        Social History     Tobacco Use    Smoking status: Never Smoker    Smokeless tobacco: Never Used   Substance Use Topics    Alcohol use: Yes     Comment: socially     Drug use: No       Family History   Problem Relation Age of Onset    Cancer Father         Stomach Ca     Diabetes Paternal Grandmother            Medication List           Accurate as of 11/1/18  4:39 PM. If you have any questions, ask your nurse or doctor.               START taking these medications    apixaban 5 mg Tab  Commonly known as:  ELIQUIS  Take 1 tablet (5 mg total) by mouth 2 (two) times daily.  Started by:  Shahzad Hou Md, MD     furosemide 40 MG tablet  Commonly known as:  LASIX  Take 1 tablet (40 mg total) by mouth daily as needed. Take next 3 days  Started by:  Shahzad Hou Md, MD     levoFLOXacin 750 MG tablet  Commonly known as:  LEVAQUIN  Take 1 tablet (750 mg total) by mouth once daily.  Started by:  Diane Bowen MD        CONTINUE taking these medications    amLODIPine 5 MG tablet  Commonly known as:  NORVASC  Take 1 tablet (5 mg total) by mouth once daily.     aspirin 81 MG Chew     clonazePAM 0.5 MG tablet  Commonly known as:  KLONOPIN  Take 1 tablet (0.5 mg total) by mouth every evening.     ginkgo biloba 120 mg Tab     sertraline 100 MG tablet  Commonly known as:  ZOLOFT  Take 1 tablet (100 mg total) by mouth once daily.     traZODone 150 MG tablet  Commonly known as:  DESYREL  TAKE ONE TABLET BY MOUTH ONCE DAILY IN THE EVENING           Where to Get Your Medications      These medications were sent to [facility] [address] [address]    Hours:  Mon-Fri, 8a-5:30p Phone:  [phone]   · apixaban 5 mg Tab     These medications were sent to [facility] [address] [place] [address] [place] - [address] [place] [address]  [address] [facility] [address]    Phone:  [phone]   · furosemide 40 MG tablet  · levoFLOXacin 750 MG tablet         Review of Systems   Constitutional: Negative.    HENT: Negative.    Eyes: Negative.    Respiratory: Positive for shortness of breath.    Cardiovascular: Positive for leg swelling.   Gastrointestinal: Negative.    Genitourinary: Negative.    Musculoskeletal: Negative.    Skin: Negative.    Neurological: Negative.    Endo/Heme/Allergies: Negative.   "  Psychiatric/Behavioral: Negative.    All 12 systems otherwise negative.      Wt Readings from Last 3 Encounters:   11/01/18 98.2 kg (216 lb 7.9 oz)   10/31/18 97.6 kg (215 lb 0.9 oz)   10/12/18 96.7 kg (213 lb 3 oz)     Temp Readings from Last 3 Encounters:   10/31/18 98.3 °F (36.8 °C) (Tympanic)   07/24/18 97.4 °F (36.3 °C) (Tympanic)   12/15/17 96.5 °F (35.8 °C) (Tympanic)     BP Readings from Last 3 Encounters:   11/01/18 (!) 184/56   10/31/18 (!) 165/58   10/12/18 (!) 140/48     Pulse Readings from Last 3 Encounters:   11/01/18 (!) 48   10/31/18 (!) 53   10/12/18 (!) 58       BP (!) 184/56 (BP Location: Right arm, Patient Position: Sitting, BP Method: Medium (Manual))   Pulse (!) 48 Comment: Irregular  Ht 5' 8" (1.727 m)   Wt 98.2 kg (216 lb 7.9 oz)   BMI 32.92 kg/m²     Objective:   Physical Exam   Constitutional: He is oriented to person, place, and time. He appears well-developed and well-nourished. No distress.   HENT:   Head: Normocephalic and atraumatic.   Nose: Nose normal.   Mouth/Throat: Oropharynx is clear and moist.   Eyes: Conjunctivae and EOM are normal. No scleral icterus.   Neck: Normal range of motion. Neck supple. No JVD present. No thyromegaly present.   Cardiovascular: S1 normal and S2 normal. An irregularly irregular rhythm present. Bradycardia present. Exam reveals no gallop, no S3, no S4 and no friction rub.   No murmur heard.  Pulmonary/Chest: Effort normal and breath sounds normal. No stridor. No respiratory distress. He has no wheezes. He has no rales. He exhibits no tenderness.   Abdominal: Soft. Bowel sounds are normal. He exhibits no distension and no mass. There is no tenderness. There is no rebound.   Genitourinary:   Genitourinary Comments: Deferred   Musculoskeletal: Normal range of motion. He exhibits no edema, tenderness or deformity.   Lymphadenopathy:     He has no cervical adenopathy.   Neurological: He is alert and oriented to person, place, and time. He exhibits " normal muscle tone. Coordination normal.   Skin: Skin is warm and dry. No rash noted. He is not diaphoretic. No erythema. No pallor.   Psychiatric: He has a normal mood and affect. His behavior is normal. Judgment and thought content normal.   Nursing note and vitals reviewed.      Lab Results   Component Value Date     10/10/2018    K 4.4 10/10/2018     10/10/2018    CO2 25 10/10/2018    BUN 19 10/10/2018    CREATININE 1.1 10/10/2018    GLU 96 10/10/2018    MG 2.3 08/26/2016    AST 15 07/24/2018    ALT 8 (L) 07/24/2018    ALBUMIN 3.4 (L) 10/10/2018    PROT 6.8 07/24/2018    BILITOT 0.9 07/24/2018    WBC 7.50 07/24/2018    HGB 13.7 (L) 07/24/2018    HCT 41.8 07/24/2018    MCV 93 07/24/2018     07/24/2018    INR 1.0 08/26/2016    TSH 1.855 07/24/2018    CHOL 154 10/17/2017    HDL 49 10/17/2017    LDLCALC 91.8 10/17/2017    TRIG 66 10/17/2017     Assessment:      1. PAF (paroxysmal atrial fibrillation)    2. CKD (chronic kidney disease) stage 3, GFR 30-59 ml/min    3. Essential hypertension    4. Obstructive sleep apnea syndrome        Plan:   1. PAF, newly diagnosed  - CYNTHIA/DCCV - if pt agrees once PNA resolves  - start Eliquis 5 BID  - 2D ECHO pending  - needs ischemic workup once PNA/CHF relieved   - SAMUEL re-eval  - order TSH/T4  - Holter to eval rates    2. HTN  - add lasix today  - low salt diet  - cont meds    3. Dyspnea  - likely sec to Afib vs CHF  - lasix 40mg next 3 days and then PRN  - low salt    4. SAMUEL  - pt does not want to wear mask  - needs to be compliant     5. CKD3  - cont to monitor per PCP/renal    It pt has more dyspnea,fatigue, LE swelling needs to go to ER, wife understands    Thank you for allowing me to participate in this patient's care. Please do not hesitate to contact me with any questions or concerns. Consult note has been forwarded to the referral physician.

## 2018-11-01 NOTE — TELEPHONE ENCOUNTER
Spoke with pt's wife, Rosa, informed her of pt's test results and recommendations. She states she would like pt's antibiotic sent to pharmacy.     Message sent to Dr. Maier via result note.

## 2018-11-01 NOTE — LETTER
November 1, 2018      Diane Bowen MD  [address] [facility] [address]           [facility]  [address] [place] [id number]  Phone: [phone]  Fax: [phone]          Patient: Santos Morales   MR Number: [id number]   YOB: 1935   Date of Visit: 11/1/2018       Dear Dr. Diane Bowen:    Thank you for referring Santos Morales to me for evaluation. Attached you will find relevant portions of my assessment and plan of care.    If you have questions, please do not hesitate to call me. I look forward to following Santos Morales along with you.    Sincerely,    Shahzad Hou MD    Enclosure  CC:  No Recipients    If you would like to receive this communication electronically, please contact [email or web address] or [phone] to request more information on [facility] access.    For providers and/or their staff who would like to refer a patient to [facility], please contact us through our one-stop-shop provider referral line, [facility], at [phone].    If you feel you have received this communication in error or would no longer like to receive these types of communications, please e-mail [email or web address]

## 2018-11-01 NOTE — PATIENT INSTRUCTIONS
Understanding Atrial Fibrillation    An arrhythmia is any problem with the speed or pattern of the heartbeat. Atrial fibrillation (AFib) is a common type of arrhythmia. It causes fast, chaotic electrical signals in the atria. This leads to poor functioning of the heart. It also affects how much blood your heart can pump out to the body.  Afib may occur once in a while and go away on its own. Or it may continue for longer periods and need treatment.  AFib can lead to serious problems, such as stroke. Your healthcare provider will need to monitor and manage it.  What happens during atrial fibrillation?   The heart has an electrical system that sends signals to control the heartbeat. As signals move through the heart, they tell the hearts upper chambers (atria) and lower chambers (ventricles) when to squeeze (contract) and relax. This lets blood move through the heart and out to the body and lungs.  With AFib, the atria receive abnormal signals. This causes them to contract in a fast and irregular way, and out of sync with the ventricles. When this happens, the atria also have a harder time moving blood into the ventricles. Blood may then pool in the atria, which increases the risk for blood clots and stroke. The ventricles also may contract too quickly and irregularly. As a result, they may not pump blood to the body and lungs as well as they should. This can weaken the heart muscle over time and cause heart failure.  What causes atrial fibrillation?  AFib is more common in older adults. It has many possible causes including:  · Coronary artery disease  · Heart valve disease  · Heart attack  · Heart surgery  · High blood pressure  · Thyroid disease  · Diabetes  · Lung disease  · Sleep apnea  · Heavy alcohol use  In some cases of AFib, doctors do not know the cause.  What are the symptoms of atrial fibrillation?  AFib may or may not cause symptoms. If symptoms do occur, they may include:  · A fast, pounding,  irregular heartbeat  · Shortness of breath  · Tiredness  · Dizziness or fainting  · Chest pain  How is atrial fibrillation treated?  Treatments for AFib can include any of the options below.  · Medicines. You may be prescribed:  ¨ Heart rate medicines to help slow down the heartbeat  ¨ Heart rhythm medicines to help the heart beat more regularly  ¨ Anti-clotting medicines to help reduce the risk for blood clots and stroke  · Electrical cardioversion. Your healthcare provider uses special pads or paddles to send one or more brief electrical shocks to the heart. This can help reset the heartbeat to normal.  · Ablation. Long, thin tubes called catheters are threaded through a blood vessel to the heart. There, the catheters send out hot or cold energy to the areas causing the abnormal signals. This energy destroys the problem tissue or cells. This improves the chances that your heart will stay in normal rhythm without using medicines. If your heart rate and rhythm cant be controlled, you may need ablation and a pacemaker. These will help control the heart rate and regularity of the heartbeat.  · Surgery. During surgery, your healthcare provider may use different methods to create scar tissue in the areas of the heart causing the abnormal signals. The scar tissue disrupts the abnormal signals and may stop AFib from occurring.  What are the complications of atrial fibrillation?  These can include:  · Blood clots  · Stroke  · Heart failure. This problem occurs when the heart muscle weakens so much that it can no longer pump blood well.  When should I call my healthcare provider?  Call your healthcare provider right away if you have any of these:  · Symptoms that dont get better with treatment, or get worse  · New symptoms   Date Last Reviewed: 5/1/2016  © 4557-4305 [employer]. [address] [place] [address], [place] [address]. All rights reserved. This information is not intended as a substitute for professional  medical care. Always follow your healthcare professional's instructions.

## 2018-11-01 NOTE — TELEPHONE ENCOUNTER
----- Message from Elsie Lama sent at 11/1/2018  9:34 AM CDT -----  Contact: pt wife  Calling in regards to a missed call and please advise. [phone] (home) [phone]

## 2018-11-01 NOTE — TELEPHONE ENCOUNTER
----- Message from Jaycee Kern MA sent at 11/1/2018  9:41 AM CDT -----  Conveyed result and recommendations to patient's wife, Rosa, who verbalized understanding. Pt's wife states she would like antibiotic sent to [facility] pharmacy.

## 2018-11-05 ENCOUNTER — HOSPITAL ENCOUNTER (OUTPATIENT)
Dept: RADIOLOGY | Facility: HOSPITAL | Age: 83
Discharge: HOME | End: 2018-11-05
Attending: FAMILY MEDICINE
Payer: COMMERCIAL

## 2018-11-05 ENCOUNTER — TELEPHONE (OUTPATIENT)
Dept: FAMILY MEDICINE | Facility: CLINIC | Age: 83
End: 2018-11-05

## 2018-11-05 ENCOUNTER — OFFICE VISIT (OUTPATIENT)
Dept: FAMILY MEDICINE | Facility: CLINIC | Age: 83
End: 2018-11-05
Payer: COMMERCIAL

## 2018-11-05 ENCOUNTER — CLINICAL SUPPORT (OUTPATIENT)
Dept: PULMONOLOGY | Facility: CLINIC | Age: 83
End: 2018-11-05
Payer: COMMERCIAL

## 2018-11-05 VITALS
SYSTOLIC BLOOD PRESSURE: 128 MMHG | DIASTOLIC BLOOD PRESSURE: 60 MMHG | BODY MASS INDEX: 31.96 KG/M2 | WEIGHT: 210.88 LBS | HEIGHT: 68 IN | HEART RATE: 54 BPM | OXYGEN SATURATION: 95 % | TEMPERATURE: 98 F

## 2018-11-05 DIAGNOSIS — R94.2 ABNORMAL PFT: Primary | ICD-10-CM

## 2018-11-05 DIAGNOSIS — J18.9 PNEUMONIA OF RIGHT LUNG DUE TO INFECTIOUS ORGANISM, UNSPECIFIED PART OF LUNG: Primary | ICD-10-CM

## 2018-11-05 DIAGNOSIS — J18.9 PNEUMONIA OF RIGHT LUNG DUE TO INFECTIOUS ORGANISM, UNSPECIFIED PART OF LUNG: ICD-10-CM

## 2018-11-05 DIAGNOSIS — R06.02 SOB (SHORTNESS OF BREATH): ICD-10-CM

## 2018-11-05 LAB
BRPFT: ABNORMAL
DLCO ADJ PRE: 10.94 ML/(MIN*MMHG) (ref 16.76–30.62)
DLCO SINGLE BREATH LLN: 16.76
DLCO SINGLE BREATH PRE REF: 46.2 %
DLCO SINGLE BREATH REF: 23.69
DLCOC SBVA LLN: 2.27
DLCOC SBVA PRE REF: 61.4 %
DLCOC SBVA REF: 3.43
DLCOC SINGLE BREATH LLN: 16.76
DLCOC SINGLE BREATH PRE REF: 46.2 %
DLCOC SINGLE BREATH REF: 23.69
DLCOVA LLN: 2.27
DLCOVA PRE REF: 61.4 %
DLCOVA PRE: 2.11 ML/(MIN*MMHG*L) (ref 2.27–4.59)
DLCOVA REF: 3.43
DLVAADJ PRE: 2.11 ML/(MIN*MMHG*L) (ref 2.27–4.59)
ERV LLN: 0.86
ERV PRE REF: 22.4 %
ERV REF: 0.86
ERVN2 LLN: 0.86
ERVN2 REF: 0.86
FEF 25 75 CHG: 44.8 %
FEF 25 75 LLN: 0.35
FEF 25 75 POST REF: 152.3 %
FEF 25 75 PRE REF: 105.2 %
FEF 25 75 REF: 2.02
FET100 CHG: -15.8 %
FEV1 CHG: 10.2 %
FEV1 FVC CHG: 9 %
FEV1 FVC LLN: 42
FEV1 FVC POST REF: 125.9 %
FEV1 FVC PRE REF: 115.4 %
FEV1 FVC REF: 73
FEV1 LLN: 2.08
FEV1 POST REF: 86.3 %
FEV1 PRE REF: 78.3 %
FEV1 REF: 2.83
FEV6 LLN: 2.8
FEV6 REF: 3.67
FRCN2 LLN: 2.76
FRCN2 REF: 3.75
FRCPLETH LLN: 2.76
FRCPLETH PREREF: 83 %
FRCPLETH REF: 3.75
FVC CHG: 1.1 %
FVC LLN: 2.82
FVC POST REF: 71.3 %
FVC PRE REF: 70.5 %
FVC REF: 3.7
IVC PRE: 1.6 L (ref 2.82–4.58)
IVC SINGLE BREATH LLN: 2.82
IVC SINGLE BREATH PRE REF: 43.2 %
IVC SINGLE BREATH REF: 3.7
MVV LLN: 89
MVV PRE REF: 67.5 %
MVV REF: 105
PEF CHG: -4.2 %
PEF LLN: 4
PEF POST REF: 81.4 %
PEF PRE REF: 85 %
PEF REF: 6.58
POST FEF 25 75: 3.07 L/S (ref 0.35–3.69)
POST FET 100: 3.5 SEC
POST FEV1 FVC: 92.35 % (ref 42.21–104.53)
POST FEV1: 2.44 L (ref 2.08–3.57)
POST FVC: 2.64 L (ref 2.82–4.58)
POST PEF: 5.35 L/S (ref 4–9.15)
PRE DLCO: 10.94 ML/(MIN*MMHG) (ref 16.76–30.62)
PRE ERV: 0.19 L (ref 0.86–0.86)
PRE FEF 25 75: 2.12 L/S (ref 0.35–3.69)
PRE FET 100: 4.16 SEC
PRE FEV1 FVC: 84.69 % (ref 42.21–104.53)
PRE FEV1: 2.21 L (ref 2.08–3.57)
PRE FRC PL: 3.11 L
PRE FVC: 2.61 L (ref 2.82–4.58)
PRE MVV: 71 L/MIN (ref 89.35–120.89)
PRE PEF: 5.59 L/S (ref 4–9.15)
PRE RV: 1.61 L (ref 2.21–3.56)
PRE TLC: 4.22 L (ref 5.75–8.05)
RAW LLN: 3.06
RAW PRE REF: 171.3 %
RAW PRE: 5.24 CMH2O*S/L (ref 3.06–3.06)
RAW REF: 3.06
RV LLN: 2.21
RV PRE REF: 55.8 %
RV REF: 2.89
RVN2 LLN: 2.21
RVN2 REF: 2.89
RVN2TLCN2 LLN: 37
RVN2TLCN2 REF: 46
RVTLC LLN: 37
RVTLC PRE REF: 82.4 %
RVTLC PRE: 38.16 % (ref 37.35–55.31)
RVTLC REF: 46
TLC LLN: 5.75
TLC PRE REF: 61.2 %
TLC REF: 6.9
TLCN2 LLN: 5.75
TLCN2 REF: 6.9
VA PRE: 7.82 L (ref 6.75–6.75)
VA SINGLE BREATH LLN: 6.75
VA SINGLE BREATH PRE REF: 115.9 %
VA SINGLE BREATH REF: 6.75
VC LLN: 2.82
VC PRE REF: 70.5 %
VC PRE: 2.61 L (ref 2.82–4.58)
VC REF: 3.7
VCMAXN2 LLN: 2.82
VCMAXN2 REF: 3.7
VTGRAWPRE: 3.81 L

## 2018-11-05 PROCEDURE — 99999 PR PBB SHADOW E&M-EST. PATIENT-LVL III: CPT | Mod: PBBFAC,,, | Performed by: FAMILY MEDICINE

## 2018-11-05 PROCEDURE — 94726 PLETHYSMOGRAPHY LUNG VOLUMES: CPT | Mod: S$GLB,,, | Performed by: INTERNAL MEDICINE

## 2018-11-05 PROCEDURE — 94729 DIFFUSING CAPACITY: CPT | Mod: S$GLB,,, | Performed by: INTERNAL MEDICINE

## 2018-11-05 PROCEDURE — 3074F SYST BP LT 130 MM HG: CPT | Mod: CPTII,S$GLB,, | Performed by: FAMILY MEDICINE

## 2018-11-05 PROCEDURE — 3078F DIAST BP <80 MM HG: CPT | Mod: CPTII,S$GLB,, | Performed by: FAMILY MEDICINE

## 2018-11-05 PROCEDURE — 71046 X-RAY EXAM CHEST 2 VIEWS: CPT | Mod: 26,,, | Performed by: RADIOLOGY

## 2018-11-05 PROCEDURE — 99213 OFFICE O/P EST LOW 20 MIN: CPT | Mod: S$GLB,,, | Performed by: FAMILY MEDICINE

## 2018-11-05 PROCEDURE — 94060 EVALUATION OF WHEEZING: CPT | Mod: S$GLB,,, | Performed by: INTERNAL MEDICINE

## 2018-11-05 PROCEDURE — 1101F PT FALLS ASSESS-DOCD LE1/YR: CPT | Mod: CPTII,S$GLB,, | Performed by: FAMILY MEDICINE

## 2018-11-05 PROCEDURE — 71046 X-RAY EXAM CHEST 2 VIEWS: CPT | Mod: TC,PO

## 2018-11-05 NOTE — PROGRESS NOTES
Subjective:       Patient ID: Santos Morales is a 83 y.o. male.    Chief Complaint: Follow-up    83 y old  Male her for fu on right infrahilar pneumonia after a cousre   of Levaquin. SOB has improved . He has also seen Card since due to New onset A fib . Started on eliquis and Furosemide . Echocardiogarm on  11/15       Review of Systems   Constitutional: Negative.    HENT: Negative.    Eyes: Negative.    Respiratory: Negative.    Cardiovascular: Negative.    Gastrointestinal: Negative.    Genitourinary: Negative.    Musculoskeletal: Negative.    Skin: Negative.    Hematological: Negative.        Objective:      Physical Exam   Constitutional: He is oriented to person, place, and time. He appears well-developed and well-nourished. No distress.   HENT:   Head: Normocephalic and atraumatic.   Right Ear: External ear normal.   Left Ear: External ear normal.   Nose: Nose normal.   Mouth/Throat: No oropharyngeal exudate.   Eyes: Conjunctivae and EOM are normal. Pupils are equal, round, and reactive to light. Right eye exhibits no discharge. Left eye exhibits no discharge. No scleral icterus.   Neck: Normal range of motion. Neck supple. No JVD present. No tracheal deviation present. No thyromegaly present.   Cardiovascular: Regular rhythm and intact distal pulses. Bradycardia present. Exam reveals no gallop and no friction rub.   Murmur heard.   Systolic murmur is present with a grade of 3/6.  Pulmonary/Chest: Effort normal and breath sounds normal. No stridor. No respiratory distress. He has no wheezes. He has no rales. He exhibits no tenderness.   Abdominal: Soft. Bowel sounds are normal. He exhibits no distension. There is no tenderness. There is no rebound and no guarding.   Musculoskeletal: Normal range of motion. He exhibits no edema or tenderness.   Lymphadenopathy:     He has no cervical adenopathy.   Neurological: He is alert and oriented to person, place, and time. He has normal reflexes. He displays normal  reflexes. No cranial nerve deficit. He exhibits normal muscle tone. Coordination normal.   Skin: Skin is warm and dry. No rash noted. He is not diaphoretic. No erythema. No pallor.   Psychiatric: He has a normal mood and affect. His behavior is normal. Judgment and thought content normal.       Assessment:       1. Pneumonia of right lung due to infectious organism, unspecified part of lung        Plan:     Santos was seen today for follow-up.    Diagnoses and all orders for this visit:    Pneumonia of right lung due to infectious organism, unspecified part of lung  -     X-Ray Chest PA And Lateral; Future     X rays   Labs

## 2018-11-07 ENCOUNTER — TELEPHONE (OUTPATIENT)
Dept: PULMONOLOGY | Facility: CLINIC | Age: 83
End: 2018-11-07

## 2018-11-07 NOTE — TELEPHONE ENCOUNTER
Returned call to patient who called to cancel appointment on 11/7 at 2:40. No answer, LVM, but did notice on patient chart that he is Mosotho speaking and needs intertpreter, so not sure if he will understand the message or not. Message stating that I was cancelling his appointment for him today as he wished.

## 2018-11-07 NOTE — TELEPHONE ENCOUNTER
----- Message from Jose Esteban sent at 11/6/2018  7:26 PM CST -----  Contact: self  Pt called to cancel appt.          Pt callback number [phone]

## 2018-11-15 ENCOUNTER — CLINICAL SUPPORT (OUTPATIENT)
Dept: CARDIOLOGY | Facility: CLINIC | Age: 83
End: 2018-11-15
Attending: FAMILY MEDICINE
Payer: COMMERCIAL

## 2018-11-15 DIAGNOSIS — R06.02 SOB (SHORTNESS OF BREATH): ICD-10-CM

## 2018-11-15 LAB
AORTIC VALVE REGURGITATION: ABNORMAL
ESTIMATED PA SYSTOLIC PRESSURE: 28.79
MITRAL VALVE REGURGITATION: ABNORMAL
RETIRED EF AND QEF - SEE NOTES: 60 (ref 55–65)

## 2018-11-15 PROCEDURE — 93306 TTE W/DOPPLER COMPLETE: CPT | Mod: S$GLB,,, | Performed by: INTERNAL MEDICINE

## 2018-11-16 ENCOUNTER — TELEPHONE (OUTPATIENT)
Dept: FAMILY MEDICINE | Facility: CLINIC | Age: 83
End: 2018-11-16

## 2018-11-16 NOTE — TELEPHONE ENCOUNTER
----- Message from Rebecca Lama sent at 11/16/2018  2:48 PM CST -----  Contact: wife- [phone]  Returning call. Alex call back at [phone]..Thanks noris

## 2018-11-16 NOTE — TELEPHONE ENCOUNTER
Spoke to pt's wife, Rosa, informed her that pt's hear US is abnormal with recommendation to follow-up with Cardiologist, Dr. Hou. She states pt will have to call back to schedule, he is out of town.

## 2018-12-19 ENCOUNTER — CLINICAL SUPPORT (OUTPATIENT)
Dept: CARDIOLOGY | Facility: CLINIC | Age: 83
End: 2018-12-19
Attending: INTERNAL MEDICINE
Payer: COMMERCIAL

## 2018-12-19 DIAGNOSIS — I48.0 PAF (PAROXYSMAL ATRIAL FIBRILLATION): ICD-10-CM

## 2018-12-19 PROCEDURE — 93224 XTRNL ECG REC UP TO 48 HRS: CPT | Mod: S$GLB,,, | Performed by: INTERNAL MEDICINE

## 2018-12-21 ENCOUNTER — TELEPHONE (OUTPATIENT)
Dept: CARDIOLOGY | Facility: CLINIC | Age: 83
End: 2018-12-21

## 2018-12-21 NOTE — TELEPHONE ENCOUNTER
----- Message from Shahzad Hou MD sent at 12/21/2018  4:24 PM CST -----  Please call the patient regarding his abnormal result.  Heart rates remain very low, if he has any symptoms of weakness/SOB/fatigue needs to go to ER to evaluate and discuss pacemaker.

## 2019-01-16 DIAGNOSIS — F41.9 ANXIETY: ICD-10-CM

## 2019-01-16 RX ORDER — CLONAZEPAM 0.5 MG/1
TABLET ORAL
Qty: 30 TABLET | Refills: 2 | OUTPATIENT
Start: 2019-01-16

## 2019-01-17 ENCOUNTER — TELEPHONE (OUTPATIENT)
Dept: FAMILY MEDICINE | Facility: CLINIC | Age: 84
End: 2019-01-17

## 2019-01-17 DIAGNOSIS — F41.9 ANXIETY: ICD-10-CM

## 2019-01-17 RX ORDER — CLONAZEPAM 0.5 MG/1
0.5 TABLET ORAL NIGHTLY
Qty: 30 TABLET | Refills: 0 | Status: SHIPPED | OUTPATIENT
Start: 2019-01-17 | End: 2019-02-19 | Stop reason: SDUPTHER

## 2019-01-17 NOTE — TELEPHONE ENCOUNTER
----- Message from Vincent Cano sent at 1/17/2019  2:58 PM CST -----  Contact: wife  Pt calling in reference to script that was not approved. Pt would like cb to see what it would take to get approval for the clonazipan 25mg         ..[phone]        ..  [id number] - [place] - [address]  [address] [address]  Phone: [phone] Fax: [phone]

## 2019-01-17 NOTE — TELEPHONE ENCOUNTER
Spoke with patients wife, informed her that a 30 day supply was refilled but pt will need an appointment prior to next refill. She states she will check with her  about appointment.   Prescription faxed to [facility] on [place].

## 2019-01-17 NOTE — TELEPHONE ENCOUNTER
Spoke with pt's wife, she states that patients Clonazepam was denied because it needed to be sent to Dr. Maier advised her that medication would be sent to Dr. Maier for approval.

## 2019-01-30 DIAGNOSIS — I48.0 PAF (PAROXYSMAL ATRIAL FIBRILLATION): ICD-10-CM

## 2019-02-19 ENCOUNTER — OFFICE VISIT (OUTPATIENT)
Dept: FAMILY MEDICINE | Facility: CLINIC | Age: 84
End: 2019-02-19
Attending: FAMILY MEDICINE
Payer: COMMERCIAL

## 2019-02-19 VITALS
WEIGHT: 209 LBS | HEIGHT: 68 IN | DIASTOLIC BLOOD PRESSURE: 60 MMHG | OXYGEN SATURATION: 98 % | BODY MASS INDEX: 31.67 KG/M2 | TEMPERATURE: 96 F | HEART RATE: 58 BPM | SYSTOLIC BLOOD PRESSURE: 138 MMHG

## 2019-02-19 DIAGNOSIS — F41.9 ANXIETY: ICD-10-CM

## 2019-02-19 DIAGNOSIS — M75.101 TEAR OF RIGHT ROTATOR CUFF, UNSPECIFIED TEAR EXTENT: ICD-10-CM

## 2019-02-19 DIAGNOSIS — I10 HYPERTENSION, UNSPECIFIED TYPE: Primary | ICD-10-CM

## 2019-02-19 DIAGNOSIS — I48.0 PAF (PAROXYSMAL ATRIAL FIBRILLATION): ICD-10-CM

## 2019-02-19 DIAGNOSIS — F32.1 CURRENT MODERATE EPISODE OF MAJOR DEPRESSIVE DISORDER WITHOUT PRIOR EPISODE: ICD-10-CM

## 2019-02-19 DIAGNOSIS — N18.30 CKD (CHRONIC KIDNEY DISEASE) STAGE 3, GFR 30-59 ML/MIN: ICD-10-CM

## 2019-02-19 PROCEDURE — 99214 OFFICE O/P EST MOD 30 MIN: CPT | Mod: S$GLB,,, | Performed by: FAMILY MEDICINE

## 2019-02-19 PROCEDURE — 99214 PR OFFICE/OUTPT VISIT, EST, LEVL IV, 30-39 MIN: ICD-10-PCS | Mod: S$GLB,,, | Performed by: FAMILY MEDICINE

## 2019-02-19 PROCEDURE — 3078F DIAST BP <80 MM HG: CPT | Mod: CPTII,S$GLB,, | Performed by: FAMILY MEDICINE

## 2019-02-19 PROCEDURE — 99999 PR PBB SHADOW E&M-EST. PATIENT-LVL III: ICD-10-PCS | Mod: PBBFAC,,, | Performed by: FAMILY MEDICINE

## 2019-02-19 PROCEDURE — 3075F PR MOST RECENT SYSTOLIC BLOOD PRESS GE 130-139MM HG: ICD-10-PCS | Mod: CPTII,S$GLB,, | Performed by: FAMILY MEDICINE

## 2019-02-19 PROCEDURE — 3078F PR MOST RECENT DIASTOLIC BLOOD PRESSURE < 80 MM HG: ICD-10-PCS | Mod: CPTII,S$GLB,, | Performed by: FAMILY MEDICINE

## 2019-02-19 PROCEDURE — 3075F SYST BP GE 130 - 139MM HG: CPT | Mod: CPTII,S$GLB,, | Performed by: FAMILY MEDICINE

## 2019-02-19 PROCEDURE — 99999 PR PBB SHADOW E&M-EST. PATIENT-LVL III: CPT | Mod: PBBFAC,,, | Performed by: FAMILY MEDICINE

## 2019-02-19 PROCEDURE — 1101F PR PT FALLS ASSESS DOC 0-1 FALLS W/OUT INJ PAST YR: ICD-10-PCS | Mod: CPTII,S$GLB,, | Performed by: FAMILY MEDICINE

## 2019-02-19 PROCEDURE — 1101F PT FALLS ASSESS-DOCD LE1/YR: CPT | Mod: CPTII,S$GLB,, | Performed by: FAMILY MEDICINE

## 2019-02-19 RX ORDER — SERTRALINE HYDROCHLORIDE 50 MG/1
TABLET, FILM COATED ORAL
Qty: 30 TABLET | Refills: 2 | Status: SHIPPED | OUTPATIENT
Start: 2019-02-19 | End: 2019-05-14 | Stop reason: SDUPTHER

## 2019-02-19 RX ORDER — CLONAZEPAM 0.5 MG/1
0.5 TABLET ORAL NIGHTLY
Qty: 30 TABLET | Refills: 2 | Status: SHIPPED | OUTPATIENT
Start: 2019-02-19 | End: 2019-05-23 | Stop reason: SDUPTHER

## 2019-02-19 NOTE — PROGRESS NOTES
Subjective:       Patient ID: Santos Morales is a 83 y.o. male.    Chief Complaint: Medication Refill    83 y old male with HTN, PAF, Bradycardia , Depression ,R rotator cuff tear ,  stage 3 ckd here for f.u . Not doing well . Still very depressed, Has anhedonia , helplessness and anorexia . NOt interested  in seeing Psych .Non suicidal .Denies sob , cp , leg edema . Had Holter placed on 12/19 monitor which showed bradycardia . He did not keep noa for CYNTHIA with cardioversion . Refused to take meds at times.+ Memory loss, declines neuropsychic eval . Doesn't want to f.u with ortho since steroid  injection did not help       Review of Systems   Constitutional: Negative.    HENT: Negative.    Eyes: Negative.    Respiratory: Negative.    Cardiovascular: Negative.    Gastrointestinal: Negative.    Genitourinary: Negative.    Musculoskeletal: Positive for arthralgias.   Skin: Negative.    Hematological: Negative.    Psychiatric/Behavioral: Positive for behavioral problems and dysphoric mood. Negative for agitation, self-injury, sleep disturbance and suicidal ideas. The patient is not nervous/anxious and is not hyperactive.        Objective:      Physical Exam   Constitutional: He is oriented to person, place, and time. He appears well-developed and well-nourished. No distress.   HENT:   Head: Normocephalic and atraumatic.   Right Ear: External ear normal.   Left Ear: External ear normal.   Nose: Nose normal.   Mouth/Throat: No oropharyngeal exudate.   Eyes: Conjunctivae and EOM are normal. Pupils are equal, round, and reactive to light. Right eye exhibits no discharge. Left eye exhibits no discharge. No scleral icterus.   Neck: Normal range of motion. Neck supple. No JVD present. No tracheal deviation present. No thyromegaly present.   Cardiovascular: Regular rhythm, normal heart sounds and intact distal pulses. Bradycardia present. Exam reveals no gallop and no friction rub.   No murmur heard.  Pulmonary/Chest: Effort  normal and breath sounds normal. No stridor. No respiratory distress. He has no wheezes. He has no rales. He exhibits no tenderness.   Abdominal: Soft. Bowel sounds are normal. He exhibits no distension. There is no tenderness. There is no rebound and no guarding.   Musculoskeletal: Normal range of motion. He exhibits no edema or tenderness.   Lymphadenopathy:     He has no cervical adenopathy.   Neurological: He is alert and oriented to person, place, and time. He has normal reflexes. He displays normal reflexes. No cranial nerve deficit. He exhibits normal muscle tone. Coordination normal.   Skin: Skin is warm and dry. No rash noted. He is not diaphoretic. No erythema. No pallor.   Psychiatric: His behavior is normal. Judgment and thought content normal. His affect is blunt. He exhibits a depressed mood.       Assessment:         Hypertension, unspecified type    Anxiety  -     clonazePAM (KLONOPIN) 0.5 MG tablet; Take 1 tablet (0.5 mg total) by mouth every evening.  Dispense: 30 tablet; Refill: 2    Current moderate episode of major depressive disorder without prior episode    PAF (paroxysmal atrial fibrillation)    CKD (chronic kidney disease) stage 3, GFR 30-59 ml/min    Tear of right rotator cuff, unspecified tear extent    Other orders  -     sertraline (ZOLOFT) 50 MG tablet; 1 tab po qd for a total of 150 mg daily  Dispense: 30 tablet; Refill: 2      Plan:     Santos was seen today for medication refill.    Diagnoses and all orders for this visit:    Hypertension, unspecified type    Anxiety  -     clonazePAM (KLONOPIN) 0.5 MG tablet; Take 1 tablet (0.5 mg total) by mouth every evening.    Current moderate episode of major depressive disorder without prior episode    PAF (paroxysmal atrial fibrillation)    Other orders  -     sertraline (ZOLOFT) 50 MG tablet; 1 tab po qd for a total of 150 mg daily     Controlled. Cont med   Med rf   Increase zoloft . F.u in 1 m   Avoid NSAID  Refused f.u with Ortho

## 2019-04-09 ENCOUNTER — OFFICE VISIT (OUTPATIENT)
Dept: CARDIOLOGY | Facility: CLINIC | Age: 84
End: 2019-04-09
Payer: COMMERCIAL

## 2019-04-09 ENCOUNTER — CLINICAL SUPPORT (OUTPATIENT)
Dept: CARDIOLOGY | Facility: CLINIC | Age: 84
DRG: 310 | End: 2019-04-09
Payer: COMMERCIAL

## 2019-04-09 ENCOUNTER — HOSPITAL ENCOUNTER (INPATIENT)
Facility: HOSPITAL | Age: 84
LOS: 3 days | Discharge: HOME | DRG: 310 | End: 2019-04-13
Attending: EMERGENCY MEDICINE | Admitting: INTERNAL MEDICINE
Payer: COMMERCIAL

## 2019-04-09 VITALS
BODY MASS INDEX: 31.5 KG/M2 | WEIGHT: 207.88 LBS | HEART RATE: 42 BPM | SYSTOLIC BLOOD PRESSURE: 172 MMHG | HEIGHT: 68 IN | DIASTOLIC BLOOD PRESSURE: 50 MMHG

## 2019-04-09 DIAGNOSIS — R00.1 SYMPTOMATIC BRADYCARDIA: Primary | ICD-10-CM

## 2019-04-09 DIAGNOSIS — I48.0 PAROXYSMAL ATRIAL FIBRILLATION: Primary | ICD-10-CM

## 2019-04-09 DIAGNOSIS — N18.30 CKD (CHRONIC KIDNEY DISEASE) STAGE 3, GFR 30-59 ML/MIN: ICD-10-CM

## 2019-04-09 DIAGNOSIS — I10 ESSENTIAL HYPERTENSION: ICD-10-CM

## 2019-04-09 DIAGNOSIS — Z79.01 CHRONIC ANTICOAGULATION: ICD-10-CM

## 2019-04-09 DIAGNOSIS — I48.0 PAROXYSMAL ATRIAL FIBRILLATION: ICD-10-CM

## 2019-04-09 DIAGNOSIS — I48.0 PAF (PAROXYSMAL ATRIAL FIBRILLATION): ICD-10-CM

## 2019-04-09 PROBLEM — R79.89 ELEVATED BRAIN NATRIURETIC PEPTIDE (BNP) LEVEL: Status: ACTIVE | Noted: 2019-04-09

## 2019-04-09 LAB
ALBUMIN SERPL BCP-MCNC: 3.8 G/DL (ref 3.5–5.2)
ALP SERPL-CCNC: 54 U/L (ref 55–135)
ALT SERPL W/O P-5'-P-CCNC: 19 U/L (ref 10–44)
ANION GAP SERPL CALC-SCNC: 9 MMOL/L (ref 8–16)
AST SERPL-CCNC: 21 U/L (ref 10–40)
BASOPHILS # BLD AUTO: 0.03 K/UL (ref 0–0.2)
BASOPHILS NFR BLD: 0.4 % (ref 0–1.9)
BILIRUB SERPL-MCNC: 0.7 MG/DL (ref 0.1–1)
BILIRUB UR QL STRIP: NEGATIVE
BNP SERPL-MCNC: 859 PG/ML (ref 0–99)
BUN SERPL-MCNC: 38 MG/DL (ref 8–23)
CALCIUM SERPL-MCNC: 9.8 MG/DL (ref 8.7–10.5)
CHLORIDE SERPL-SCNC: 103 MMOL/L (ref 95–110)
CK SERPL-CCNC: 57 U/L (ref 20–200)
CLARITY UR: CLEAR
CO2 SERPL-SCNC: 30 MMOL/L (ref 23–29)
COLOR UR: YELLOW
CREAT SERPL-MCNC: 1.6 MG/DL (ref 0.5–1.4)
DIFFERENTIAL METHOD: ABNORMAL
EOSINOPHIL # BLD AUTO: 0.2 K/UL (ref 0–0.5)
EOSINOPHIL NFR BLD: 2.3 % (ref 0–8)
ERYTHROCYTE [DISTWIDTH] IN BLOOD BY AUTOMATED COUNT: 15.5 % (ref 11.5–14.5)
EST. GFR  (AFRICAN AMERICAN): 45 ML/MIN/1.73 M^2
EST. GFR  (NON AFRICAN AMERICAN): 39 ML/MIN/1.73 M^2
GLUCOSE SERPL-MCNC: 100 MG/DL (ref 70–110)
GLUCOSE UR QL STRIP: NEGATIVE
HCT VFR BLD AUTO: 38.6 % (ref 40–54)
HGB BLD-MCNC: 12.7 G/DL (ref 14–18)
HGB UR QL STRIP: NEGATIVE
KETONES UR QL STRIP: NEGATIVE
LEUKOCYTE ESTERASE UR QL STRIP: NEGATIVE
LYMPHOCYTES # BLD AUTO: 2.2 K/UL (ref 1–4.8)
LYMPHOCYTES NFR BLD: 30.8 % (ref 18–48)
MCH RBC QN AUTO: 28.7 PG (ref 27–31)
MCHC RBC AUTO-ENTMCNC: 32.9 G/DL (ref 32–36)
MCV RBC AUTO: 87 FL (ref 82–98)
MONOCYTES # BLD AUTO: 0.5 K/UL (ref 0.3–1)
MONOCYTES NFR BLD: 7.6 % (ref 4–15)
NEUTROPHILS # BLD AUTO: 4.2 K/UL (ref 1.8–7.7)
NEUTROPHILS NFR BLD: 58.9 % (ref 38–73)
NITRITE UR QL STRIP: NEGATIVE
PH UR STRIP: 6 [PH] (ref 5–8)
PLATELET # BLD AUTO: 143 K/UL (ref 150–350)
PMV BLD AUTO: 11.2 FL (ref 9.2–12.9)
POTASSIUM SERPL-SCNC: 4.4 MMOL/L (ref 3.5–5.1)
PROT SERPL-MCNC: 6.5 G/DL (ref 6–8.4)
PROT UR QL STRIP: NEGATIVE
RBC # BLD AUTO: 4.42 M/UL (ref 4.6–6.2)
SODIUM SERPL-SCNC: 142 MMOL/L (ref 136–145)
SP GR UR STRIP: 1.01 (ref 1–1.03)
TROPONIN I SERPL DL<=0.01 NG/ML-MCNC: 0.02 NG/ML (ref 0–0.03)
URN SPEC COLLECT METH UR: NORMAL
UROBILINOGEN UR STRIP-ACNC: NEGATIVE EU/DL
WBC # BLD AUTO: 7.11 K/UL (ref 3.9–12.7)

## 2019-04-09 PROCEDURE — 81003 URINALYSIS AUTO W/O SCOPE: CPT

## 2019-04-09 PROCEDURE — 93000 EKG 12-LEAD: ICD-10-PCS | Mod: S$GLB,,, | Performed by: INTERNAL MEDICINE

## 2019-04-09 PROCEDURE — 96374 THER/PROPH/DIAG INJ IV PUSH: CPT | Performed by: EMERGENCY MEDICINE

## 2019-04-09 PROCEDURE — 99999 PR PBB SHADOW E&M-EST. PATIENT-LVL III: CPT | Mod: PBBFAC,,, | Performed by: INTERNAL MEDICINE

## 2019-04-09 PROCEDURE — 80053 COMPREHEN METABOLIC PANEL: CPT

## 2019-04-09 PROCEDURE — 25000003 PHARM REV CODE 250: Performed by: NURSE PRACTITIONER

## 2019-04-09 PROCEDURE — 99291 CRITICAL CARE FIRST HOUR: CPT

## 2019-04-09 PROCEDURE — 93010 EKG 12-LEAD: ICD-10-PCS | Mod: ,,, | Performed by: INTERNAL MEDICINE

## 2019-04-09 PROCEDURE — G0378 HOSPITAL OBSERVATION PER HR: HCPCS

## 2019-04-09 PROCEDURE — 3078F PR MOST RECENT DIASTOLIC BLOOD PRESSURE < 80 MM HG: ICD-10-PCS | Mod: CPTII,S$GLB,, | Performed by: INTERNAL MEDICINE

## 2019-04-09 PROCEDURE — 93005 ELECTROCARDIOGRAM TRACING: CPT

## 2019-04-09 PROCEDURE — 99215 PR OFFICE/OUTPT VISIT, EST, LEVL V, 40-54 MIN: ICD-10-PCS | Mod: S$GLB,,, | Performed by: INTERNAL MEDICINE

## 2019-04-09 PROCEDURE — 82550 ASSAY OF CK (CPK): CPT

## 2019-04-09 PROCEDURE — 3077F SYST BP >= 140 MM HG: CPT | Mod: CPTII,S$GLB,, | Performed by: INTERNAL MEDICINE

## 2019-04-09 PROCEDURE — 93000 ELECTROCARDIOGRAM COMPLETE: CPT | Mod: S$GLB,,, | Performed by: INTERNAL MEDICINE

## 2019-04-09 PROCEDURE — 36415 COLL VENOUS BLD VENIPUNCTURE: CPT

## 2019-04-09 PROCEDURE — 63600175 PHARM REV CODE 636 W HCPCS: Performed by: NURSE PRACTITIONER

## 2019-04-09 PROCEDURE — 84484 ASSAY OF TROPONIN QUANT: CPT

## 2019-04-09 PROCEDURE — 1101F PR PT FALLS ASSESS DOC 0-1 FALLS W/OUT INJ PAST YR: ICD-10-PCS | Mod: CPTII,S$GLB,, | Performed by: INTERNAL MEDICINE

## 2019-04-09 PROCEDURE — 85025 COMPLETE CBC W/AUTO DIFF WBC: CPT

## 2019-04-09 PROCEDURE — 3078F DIAST BP <80 MM HG: CPT | Mod: CPTII,S$GLB,, | Performed by: INTERNAL MEDICINE

## 2019-04-09 PROCEDURE — 1101F PT FALLS ASSESS-DOCD LE1/YR: CPT | Mod: CPTII,S$GLB,, | Performed by: INTERNAL MEDICINE

## 2019-04-09 PROCEDURE — 3077F PR MOST RECENT SYSTOLIC BLOOD PRESSURE >= 140 MM HG: ICD-10-PCS | Mod: CPTII,S$GLB,, | Performed by: INTERNAL MEDICINE

## 2019-04-09 PROCEDURE — 93010 ELECTROCARDIOGRAM REPORT: CPT | Mod: ,,, | Performed by: INTERNAL MEDICINE

## 2019-04-09 PROCEDURE — 83880 ASSAY OF NATRIURETIC PEPTIDE: CPT

## 2019-04-09 PROCEDURE — 99999 PR PBB SHADOW E&M-EST. PATIENT-LVL III: ICD-10-PCS | Mod: PBBFAC,,, | Performed by: INTERNAL MEDICINE

## 2019-04-09 PROCEDURE — 99215 OFFICE O/P EST HI 40 MIN: CPT | Mod: S$GLB,,, | Performed by: INTERNAL MEDICINE

## 2019-04-09 RX ORDER — ATROPINE SULFATE 0.1 MG/ML
0.5 INJECTION INTRAVENOUS
Status: DISCONTINUED | OUTPATIENT
Start: 2019-04-09 | End: 2019-04-13 | Stop reason: HOSPADM

## 2019-04-09 RX ORDER — AMLODIPINE BESYLATE 10 MG/1
10 TABLET ORAL DAILY
Qty: 30 TABLET | Refills: 3 | Status: ON HOLD | OUTPATIENT
Start: 2019-04-09 | End: 2019-04-13 | Stop reason: HOSPADM

## 2019-04-09 RX ORDER — SERTRALINE HYDROCHLORIDE 50 MG/1
100 TABLET, FILM COATED ORAL DAILY
Status: DISCONTINUED | OUTPATIENT
Start: 2019-04-10 | End: 2019-04-13 | Stop reason: HOSPADM

## 2019-04-09 RX ORDER — HYDRALAZINE HYDROCHLORIDE 20 MG/ML
20 INJECTION INTRAMUSCULAR; INTRAVENOUS EVERY 8 HOURS PRN
Status: DISCONTINUED | OUTPATIENT
Start: 2019-04-09 | End: 2019-04-10

## 2019-04-09 RX ORDER — CYANOCOBALAMIN (VITAMIN B-12) 500 MCG
1 TABLET ORAL NIGHTLY
COMMUNITY

## 2019-04-09 RX ORDER — SODIUM CHLORIDE 0.9 % (FLUSH) 0.9 %
10 SYRINGE (ML) INJECTION
Status: DISCONTINUED | OUTPATIENT
Start: 2019-04-09 | End: 2019-04-13 | Stop reason: HOSPADM

## 2019-04-09 RX ADMIN — TRAZODONE HYDROCHLORIDE 150 MG: 100 TABLET ORAL at 08:04

## 2019-04-09 RX ADMIN — NITROGLYCERIN 1 INCH: 20 OINTMENT TOPICAL at 07:04

## 2019-04-09 RX ADMIN — HYDRALAZINE HYDROCHLORIDE 20 MG: 20 INJECTION INTRAMUSCULAR; INTRAVENOUS at 08:04

## 2019-04-09 NOTE — ED PROVIDER NOTES
SCRIBE #1 NOTE: I, Chela Blood, am scribing for, and in the presence of, Armen Ibarra MD. I have scribed the HPI, ROS, and PEx.     SCRIBE #2 NOTE: I, Fina Ortiz, am scribing for, and in the presence of,  Aleshia Holland MD. I have scribed the remaining portions of the note not scribed by Scribe #1.      History     Chief Complaint   Patient presents with    Admission for pacemaker placement     sent from cards for pacemaker placement     Review of patient's allergies indicates:  No Known Allergies      History of Present Illness     HPI    4/9/2019, 3:51 PM  History obtained from the patient      History of Present Illness: Santos Morales is a 84 y.o. male patient with a PMHx of a-fib and HTN who presents to the Emergency Department for evaluation of SOB which onset gradually at an unknown time PTA. Pt was seen by Dr. Hou (Cardiology) this AM for symptomatic bradycardia. He was sent to ED for admission for pacemaker placement. Symptoms are constant and moderate in severity. No mitigating or exacerbating factors reported. Associated sxs include palpitations, fatigue, and dizziness. Patient denies any fever, chills, congestion, rhinorrhea, sore throat, CP, leg swelling, weakness, numbness, and all other sxs at this time. No further complaints or concerns at this time.       Arrival mode: Personal vehicle      PCP: Diane Bowen MD        Past Medical History:  Past Medical History:   Diagnosis Date    Anxiety     Atrial fibrillation     Depression     Hypertension     Insomnia     Mobitz type 1 second degree AV block     followed by Dr. Hardy, Cardiology    SAMUEL (obstructive sleep apnea)        Past Surgical History:  Past Surgical History:   Procedure Laterality Date    BACK SURGERY      CATARACT EXTRACTION W/  INTRAOCULAR LENS IMPLANT Bilateral          Family History:  Family History   Problem Relation Age of Onset    Cancer Father         Stomach Ca     Diabetes  Paternal Grandmother        Social History:  Social History     Tobacco Use    Smoking status: Never Smoker    Smokeless tobacco: Never Used   Substance and Sexual Activity    Alcohol use: Yes     Comment: socially     Drug use: No    Sexual activity: Unknown        Review of Systems     Review of Systems   Constitutional: Positive for fatigue. Negative for chills and fever.   HENT: Negative for congestion, rhinorrhea and sore throat.    Respiratory: Positive for shortness of breath. Negative for cough.    Cardiovascular: Positive for palpitations. Negative for chest pain and leg swelling.   Gastrointestinal: Negative for abdominal pain, diarrhea, nausea and vomiting.   Genitourinary: Negative for dysuria, frequency and hematuria.   Musculoskeletal: Negative for back pain and neck pain.   Skin: Negative for rash.   Neurological: Positive for dizziness. Negative for weakness, numbness and headaches.   Hematological: Does not bruise/bleed easily.   All other systems reviewed and are negative.       Physical Exam     Initial Vitals [04/09/19 1531]   BP Pulse Resp Temp SpO2   (!) 160/66 (!) 47 17 97.9 °F (36.6 °C) 95 %      MAP       --          Physical Exam  Nursing Notes and Vital Signs Reviewed.  Constitutional: Patient is in no acute distress. Well-developed and well-nourished.  Head: Atraumatic. Normocephalic.  Eyes: PERRL. EOM intact. Conjunctivae are not pale. No scleral icterus.  ENT: Mucous membranes are moist. Oropharynx is clear and symmetric.    Neck: Supple. Full ROM. No lymphadenopathy.  Cardiovascular: Bradycardic. Regular rhythm. No murmurs, rubs, or gallops. Distal pulses are 2+ and symmetric.  Pulmonary/Chest: No respiratory distress. Clear to auscultation bilaterally. No wheezing or rales.  Abdominal: Soft and non-distended.  There is no tenderness.  No rebound, guarding, or rigidity. Good bowel sounds.  Genitourinary: No CVA tenderness  Musculoskeletal: Moves all extremities. 2+ BLE pitting  "edema. No calf tenderness.  Skin: Warm and dry.  Neurological:  Alert, awake, and appropriate.  Normal speech.  No acute focal neurological deficits are appreciated.  Psychiatric: Normal affect. Good eye contact. Appropriate in content.     ED Course   Critical Care  Date/Time: 4/9/2019 5:20 PM  Performed by: Aleshia Holland MD  Authorized by: Aleshia Holland MD   Direct patient critical care time: 20 minutes  Additional history critical care time: 5 minutes  Ordering / reviewing critical care time: 5 minutes  Documentation critical care time: 5 minutes  Consulting other physicians critical care time: 5 minutes  Consult with family critical care time: 5 minutes  Total critical care time (exclusive of procedural time) : 45 minutes  Critical care time was exclusive of separately billable procedures and treating other patients and teaching time.  Critical care was necessary to treat or prevent imminent or life-threatening deterioration of the following conditions: symptomatic bradycardia.  Critical care was time spent personally by me on the following activities: blood draw for specimens, development of treatment plan with patient or surrogate, discussions with consultants, interpretation of cardiac output measurements, evaluation of patient's response to treatment, examination of patient, obtaining history from patient or surrogate, ordering and performing treatments and interventions, ordering and review of laboratory studies, pulse oximetry, ordering and review of radiographic studies, re-evaluation of patient's condition and review of old charts.        ED Vital Signs:  Vitals:    04/09/19 1531 04/09/19 1555 04/09/19 1648   BP: (!) 160/66  (!) 192/78   Pulse: (!) 47 (!) 40 (!) 40   Resp: 17  18   Temp: 97.9 °F (36.6 °C)  98.2 °F (36.8 °C)   TempSrc: Oral     SpO2: 95%  99%   Weight: 93 kg (205 lb 0.4 oz)     Height: 5' 8" (1.727 m)         Abnormal Lab Results:  Labs Reviewed   CBC W/ AUTO DIFFERENTIAL " - Abnormal; Notable for the following components:       Result Value    RBC 4.42 (*)     Hemoglobin 12.7 (*)     Hematocrit 38.6 (*)     RDW 15.5 (*)     Platelets 143 (*)     All other components within normal limits   COMPREHENSIVE METABOLIC PANEL - Abnormal; Notable for the following components:    CO2 30 (*)     BUN, Bld 38 (*)     Creatinine 1.6 (*)     Alkaline Phosphatase 54 (*)     eGFR if  45 (*)     eGFR if non  39 (*)     All other components within normal limits   B-TYPE NATRIURETIC PEPTIDE - Abnormal; Notable for the following components:     (*)     All other components within normal limits   URINALYSIS, REFLEX TO URINE CULTURE    Narrative:     Preferred Collection Type->Urine, Clean Catch   CK   TROPONIN I        All Lab Results:  Results for orders placed or performed during the hospital encounter of 04/09/19   CBC auto differential   Result Value Ref Range    WBC 7.11 3.90 - 12.70 K/uL    RBC 4.42 (L) 4.60 - 6.20 M/uL    Hemoglobin 12.7 (L) 14.0 - 18.0 g/dL    Hematocrit 38.6 (L) 40.0 - 54.0 %    MCV 87 82 - 98 fL    MCH 28.7 27.0 - 31.0 pg    MCHC 32.9 32.0 - 36.0 g/dL    RDW 15.5 (H) 11.5 - 14.5 %    Platelets 143 (L) 150 - 350 K/uL    MPV 11.2 9.2 - 12.9 fL    Gran # (ANC) 4.2 1.8 - 7.7 K/uL    Lymph # 2.2 1.0 - 4.8 K/uL    Mono # 0.5 0.3 - 1.0 K/uL    Eos # 0.2 0.0 - 0.5 K/uL    Baso # 0.03 0.00 - 0.20 K/uL    Gran% 58.9 38.0 - 73.0 %    Lymph% 30.8 18.0 - 48.0 %    Mono% 7.6 4.0 - 15.0 %    Eosinophil% 2.3 0.0 - 8.0 %    Basophil% 0.4 0.0 - 1.9 %    Differential Method Automated    Comprehensive metabolic panel   Result Value Ref Range    Sodium 142 136 - 145 mmol/L    Potassium 4.4 3.5 - 5.1 mmol/L    Chloride 103 95 - 110 mmol/L    CO2 30 (H) 23 - 29 mmol/L    Glucose 100 70 - 110 mg/dL    BUN, Bld 38 (H) 8 - 23 mg/dL    Creatinine 1.6 (H) 0.5 - 1.4 mg/dL    Calcium 9.8 8.7 - 10.5 mg/dL    Total Protein 6.5 6.0 - 8.4 g/dL    Albumin 3.8 3.5 - 5.2 g/dL     Total Bilirubin 0.7 0.1 - 1.0 mg/dL    Alkaline Phosphatase 54 (L) 55 - 135 U/L    AST 21 10 - 40 U/L    ALT 19 10 - 44 U/L    Anion Gap 9 8 - 16 mmol/L    eGFR if African American 45 (A) >60 mL/min/1.73 m^2    eGFR if non African American 39 (A) >60 mL/min/1.73 m^2   Urinalysis, Reflex to Urine Culture Urine, Clean Catch   Result Value Ref Range    Specimen UA Urine, Clean Catch     Color, UA Yellow Yellow, Straw, Luz Maria    Appearance, UA Clear Clear    pH, UA 6.0 5.0 - 8.0    Specific Gravity, UA 1.010 1.005 - 1.030    Protein, UA Negative Negative    Glucose, UA Negative Negative    Ketones, UA Negative Negative    Bilirubin (UA) Negative Negative    Occult Blood UA Negative Negative    Nitrite, UA Negative Negative    Urobilinogen, UA Negative <2.0 EU/dL    Leukocytes, UA Negative Negative   Brain natriuretic peptide   Result Value Ref Range     (H) 0 - 99 pg/mL   CK   Result Value Ref Range    CPK 57 20 - 200 U/L   Troponin I   Result Value Ref Range    Troponin I 0.020 0.000 - 0.026 ng/mL         Imaging Results:  Imaging Results          X-Ray Chest AP Portable (Final result)  Result time 04/09/19 16:52:11    Final result by Riki Leon MD (04/09/19 16:52:11)                 Impression:      No acute abnormality.      Electronically signed by: Riki Leon  Date:    04/09/2019  Time:    16:52             Narrative:    EXAMINATION:  XR CHEST AP PORTABLE    CLINICAL HISTORY:  bradycardia;.    TECHNIQUE:  Single frontal portable view of the chest was performed.    COMPARISON:  11/05/2018    FINDINGS:  Support devices: None    The lungs are clear, with normal appearance of pulmonary vasculature and no  pneumothorax.  Suggestion of tiny right pleural effusion unchanged.    Unchanged cardiomegaly.    Bones are intact.                                 The EKG was ordered, reviewed, and independently interpreted by the ED provider.  Interpretation time: 1534  Rate: 41 BPM  Rhythm: Junctional  bradycardia  Interpretation: Possible anterior infarct. No STEMI.             The Emergency Provider reviewed the vital signs and test results, which are outlined above.     ED Discussion     4:00 PM: Dr. Ibarra transfers care of pt to Dr. Holland, pending workup results.    4:38 PM: Dr. Holland discussed the pt's case with Dr. Garnett (Cardiology) who recommends admission to  and discontinuing amlodipine.    5:22 PM: Discussed case with Kirsty Ibrahim NP ([facility]). Dr. Day agrees with current care and management of pt and accepts admission.   Admitting Service: Hospital medicine   Admitting Physician: Dr. Day  Admit to: Obs-Tele    5:24 PM: Re-evaluated pt. I have discussed test results, shared treatment plan, and the need for admission with patient and family at bedside. Pt and family express understanding at this time and agree with all information. All questions answered. Pt and family have no further questions or concerns at this time. Pt is ready for admit.      ED Medication(s):  Medications   apixaban tablet 5 mg (has no administration in time range)   sodium chloride 0.9% flush 10 mL (has no administration in time range)       New Prescriptions    No medications on file                 Medical Decision Making:   Clinical Tests:   Lab Tests: Ordered and Reviewed  Radiological Study: Ordered and Reviewed  Medical Tests: Ordered and Reviewed             Scribe Attestation:   Scribe #1: I performed the above scribed service and the documentation accurately describes the services I performed. I attest to the accuracy of the note.     Attending:   Physician Attestation Statement for Scribe #1: I, Armen Ibarra MD, personally performed the services described in this documentation, as scribed by Chela Blood, in my presence, and it is both accurate and complete.       Scribe Attestation:   Scribe #2: I performed the above scribed service and the documentation accurately describes the  services I performed. I attest to the accuracy of the note.    Attending Attestation:           Physician Attestation for Scribe:    Physician Attestation Statement for Scribe #2: I, Aleshia Holland MD, reviewed documentation, as scribed by Fina Ortiz in my presence, and it is both accurate and complete. I also acknowledge and confirm the content of the note done by Scribe #1.           Clinical Impression       ICD-10-CM ICD-9-CM   1. Symptomatic bradycardia R00.1 427.89   2. Essential hypertension I10 401.9   3. Chronic anticoagulation Z79.01 V58.61       Disposition:   Disposition: Placed in Observation  Condition: Serious         Aleshia Holland MD  04/09/19 6854

## 2019-04-09 NOTE — PATIENT INSTRUCTIONS
Understanding Bradycardia    Your heart has an electrical system that sends signals to control the heartbeat. Any abnormal change in the speed or pattern of the heartbeat is called an arrhythmia. An arrhythmia that causes the heart to beat slower than normal is called bradycardia. There are many types of bradycardia. In healthy children and adults, bradycardia is often normal, particularly during sleep. Sometimes bradycardia is caused by failure of the hearts natural timer or failure of the electrical pathways within the heart. Depending on the type you have and how severe it is, you may need treatment.  What causes bradycardia?  Many things can cause bradycardia, including:   · Natural aging  process  · Coronary artery disease  · Heart attack  · Heart muscle disease  · Problems with the SA node. This is the hearts natural pacemaker that starts each heartbeat.  · Problems with the electrical pathways in the heart  · Problems with the structure of the heart that you are born with  · Infection  · Use of certain medicines  · Electrolyte imbalance  · Underactive thyroid   · Sleep apnea  · Increased pressure in the brain or stroke   Well-conditioned athletes often have a naturally slow heart rate.  What are the symptoms of bradycardia?  Bradycardia can cause a slow or irregular heartbeat. It can also make it harder for the heart to pump blood to the body. This may cause symptoms such as:  · Tiredness  · Weakness  · Loss of ability to exercise  · Shortness of breath  · Dizziness or fainting  · Chest pain  · Heart failure  Some people with bradycardia have no symptoms at all.  How is bradycardia treated?  Treatment for bradycardia depends on the cause. It also depends on the type you have and how severe your symptoms are. If you need treatment, your options may include:  · Treatment of the underlying cause. For instance, if a medicine is causing bradycardia, stopping the medicine, under your doctors guidance, may  correct the problem. Or if a condition such as an underactive thyroid is the cause, treating the thyroid may keep bradycardia from coming back.  · Medicines. Medicines may be used to treat conditions that cause bradycardia.  Some medicines can also be used in the short-term to increase the heart rate. These are often not long-term solutions.   · Temporary pacing. Pacing is used to help regulate your heartbeat.When the heart beats too slowly, the device sends signals to keep the heart beating at the right pace. A temporary pacemaker may be connected to the heart using wires guided through a blood vessel in your neck or leg to the heart. This is also sometimes done using special pads placed on the chest. This may be used in an emergency, as a bridge to permanent pacing, when pacing is only needed short-term, or to further evaluate your condition.  · Pacemaker. This is a device that is placed permanently under the skin in your chest and connected to your heart. When the heart beats too slowly, the device sends signals to keep the heart beating at the right pace.  What are the complications of bradycardia?  These can include:  · Development of other types of arrhythmias  · Heart failure. This problem occurs when the heart weakens so much that it no longer pumps blood well.  · Sudden cardiac arrest. This is when the heart suddenly stops beating.  When should I call my healthcare provider?  Call your healthcare provider right away if you have any of these:  · Symptoms that dont get better with treatment, or get worse  · New symptoms   Date Last Reviewed: 5/1/2016  © 3715-0229 [facility]. [address] [place] [address], [place] [address]. All rights reserved. This information is not intended as a substitute for professional medical care. Always follow your healthcare professional's instructions.

## 2019-04-09 NOTE — ED NOTES
Pt lying in bed in NAD,VSS,RR equal and unlabored. Bed is low, locked, and call light in reach. Side rails up x 2.  Wife at bedside. No headache, dizziness, blurred vision or SOB reported.

## 2019-04-09 NOTE — PROGRESS NOTES
Subjective:   Patient ID:  Santos Morales is a 84 y.o. male who presents for cardiac consult of Hypertension (f/u) and Atrial Fibrillation      Hypertension   Associated symptoms include malaise/fatigue, palpitations and shortness of breath. Pertinent negatives include no chest pain.   Atrial Fibrillation   Symptoms include dizziness, palpitations, shortness of breath and weakness. Symptoms are negative for chest pain. Past medical history includes atrial fibrillation.     The patient came in today for cardiac consult of Hypertension (f/u) and Atrial Fibrillation    Referring Physician: Diane Bowen MD   Reason for initial consult: Afib, CHF      Santos Morales is a 84 y.o. male  pt with Afib, HTN, SAMUEL, 2nd deg Mobitz 1 AVB, CKD3, SAMUEL, anxiety, depression  presents for follow up CV eval.     11/1/18  He was at Dr. Bowen's office yesterday and had complained of SOB and ONEIL. ECG revealed new onset Afib, rate controlled in 50s.   Pt had been treated by Dr. Hardy in the past for a valve disorder. Pt's wife is translating now as he is Thai speaking.He has been SOB since Saturday. He also has more ONEIL and LE edema. At night he has orthopnea. He bruises easily. He drinks a lot of alc during weekends. He had CXR with PNA diagnosed. He is also getting PFTs and ECHO soon.     4/9/19  Pt is present with wife who is translating. ECG today Afib V rate 42. Wife says he feels tired, he has more ONEIL. He feels cold mostly, and cold hands. Pt missed prior appt and CYNTHIA/DCCV as well. He remains on Eliquis 5 BID. NO syncope but feels weak/dizzy and off balance. Holter with avg HR 43, pt has not been to follow up since then.     Patient feels no chest pain, no PND, no palpitation, no syncope.    Patient has dec exercise tolerance.    Patient is compliant with medications.      10/31/18 ECG  Atrial fibrillation with slow ventricular response, V rate 55    HOLTER  TEST DESCRIPTION   PREDOMINANT RHYTHM  1.  Persistent atrial fibrillation with ventricular rates varying between 30 and 43 bpm with an average of 43 bpm.   VENTRICULAR ARRHYTHMIAS  1. There were rare PVCs totalling 210 and averaging 4 per hour.  There were 2 couplets.  2. There were no episodes of ventricular tachycardia.    SUPRA VENTRICULAR ARRHYTHMIAS  1. Persistent atrial fibrillation was noted throughout the study.     2D ECHO  CONCLUSIONS     1 - Moderately enlarged aortic root.     2 - Biatrial enlargement.     3 - Concentric hypertrophy.     4 - No wall motion abnormalities.     5 - Normal left ventricular systolic function (EF 60-65%).     6 - Indeterminate LV diastolic function.     7 - Normal right ventricular systolic function .     8 - The estimated PA systolic pressure is 29 mmHg.     9 - Mild to moderate aortic regurgitation.     10 - Intermediate central venous pressure.       This document has been electronically    SIGNED BY: Ari Block MD On: 11/15/2018 18:02  Past Medical History:   Diagnosis Date    Anxiety     Atrial fibrillation     Depression     Hypertension     Insomnia     Mobitz type 1 second degree AV block     followed by Dr. Hardy, Cardiology    SAMUEL (obstructive sleep apnea)        Past Surgical History:   Procedure Laterality Date    BACK SURGERY      CATARACT EXTRACTION W/  INTRAOCULAR LENS IMPLANT Bilateral        Social History     Tobacco Use    Smoking status: Never Smoker    Smokeless tobacco: Never Used   Substance Use Topics    Alcohol use: Yes     Comment: socially     Drug use: No       Family History   Problem Relation Age of Onset    Cancer Father         Stomach Ca     Diabetes Paternal Grandmother        Patient's Medications   New Prescriptions    No medications on file   Previous Medications    APIXABAN (ELIQUIS) 5 MG TAB    Take 1 tablet (5 mg total) by mouth 2 (two) times daily.    CLONAZEPAM (KLONOPIN) 0.5 MG TABLET    Take 1 tablet (0.5 mg total) by mouth every evening.    FUROSEMIDE (LASIX)  40 MG TABLET    Take 1 tablet (40 mg total) by mouth daily as needed. Take next 3 days    GINKGO BILOBA 120 MG TAB    Take 1 tablet by mouth once daily.     MELATONIN 1 MG TAB    Take 1 tablet by mouth every evening.    MULTIVIT-MIN/FA/LYCOPEN/LUTEIN (CENTRUM SILVER MEN ORAL)    Take 1 tablet by mouth once daily.    SERTRALINE (ZOLOFT) 100 MG TABLET    Take 1 tablet (100 mg total) by mouth once daily.    SERTRALINE (ZOLOFT) 50 MG TABLET    1 tab po qd for a total of 150 mg daily    TRAZODONE (DESYREL) 150 MG TABLET    TAKE ONE TABLET BY MOUTH ONCE DAILY IN THE EVENING   Modified Medications    Modified Medication Previous Medication    AMLODIPINE (NORVASC) 10 MG TABLET amLODIPine (NORVASC) 5 MG tablet       Take 1 tablet (10 mg total) by mouth once daily.    Take 1 tablet (5 mg total) by mouth once daily.   Discontinued Medications    No medications on file       Review of Systems   Constitutional: Positive for malaise/fatigue.   HENT: Negative.    Eyes: Negative.    Respiratory: Positive for shortness of breath.    Cardiovascular: Positive for palpitations and leg swelling. Negative for chest pain.   Gastrointestinal: Negative.    Genitourinary: Negative.    Musculoskeletal: Negative.    Skin: Negative.    Neurological: Positive for dizziness and weakness.   Endo/Heme/Allergies: Negative.    Psychiatric/Behavioral: Negative.    All 12 systems otherwise negative.      Wt Readings from Last 3 Encounters:   04/09/19 94.3 kg (207 lb 14.3 oz)   02/19/19 94.8 kg (208 lb 15.9 oz)   11/05/18 95.7 kg (210 lb 13.9 oz)     Temp Readings from Last 3 Encounters:   02/19/19 96.2 °F (35.7 °C) (Tympanic)   11/05/18 97.6 °F (36.4 °C) (Tympanic)   10/31/18 98.3 °F (36.8 °C) (Tympanic)     BP Readings from Last 3 Encounters:   04/09/19 (!) 172/50   02/19/19 138/60   11/05/18 128/60     Pulse Readings from Last 3 Encounters:   04/09/19 (!) 42   02/19/19 (!) 58   11/05/18 (!) 54       BP (!) 172/50 (BP Method: Large (Manual))   Pulse  "(!) 42   Ht 5' 8" (1.727 m)   Wt 94.3 kg (207 lb 14.3 oz)   BMI 31.61 kg/m²     Objective:   Physical Exam   Constitutional: He is oriented to person, place, and time. He appears well-developed and well-nourished. No distress.   HENT:   Head: Normocephalic and atraumatic.   Nose: Nose normal.   Mouth/Throat: Oropharynx is clear and moist.   Eyes: Conjunctivae and EOM are normal. No scleral icterus.   Neck: Normal range of motion. Neck supple. No JVD present. No thyromegaly present.   Cardiovascular: S1 normal and S2 normal. An irregularly irregular rhythm present. Bradycardia present. Exam reveals no gallop, no S3, no S4 and no friction rub.   No murmur heard.  Pulmonary/Chest: Effort normal and breath sounds normal. No stridor. No respiratory distress. He has no wheezes. He has no rales. He exhibits no tenderness.   Abdominal: Soft. Bowel sounds are normal. He exhibits no distension and no mass. There is no tenderness. There is no rebound.   Genitourinary:   Genitourinary Comments: Deferred   Musculoskeletal: Normal range of motion. He exhibits no edema, tenderness or deformity.   Lymphadenopathy:     He has no cervical adenopathy.   Neurological: He is alert and oriented to person, place, and time. He exhibits normal muscle tone. Coordination normal.   Skin: Skin is warm and dry. No rash noted. He is not diaphoretic. No erythema. No pallor.   Psychiatric: He has a normal mood and affect. His behavior is normal. Judgment and thought content normal.   Nursing note and vitals reviewed.      Lab Results   Component Value Date     11/05/2018    K 4.2 11/05/2018     11/05/2018    CO2 29 11/05/2018    BUN 19 11/05/2018    CREATININE 1.5 (H) 11/05/2018     11/05/2018    MG 2.3 08/26/2016    AST 18 11/05/2018    ALT 25 11/05/2018    ALBUMIN 3.3 (L) 11/05/2018    PROT 6.4 11/05/2018    BILITOT 0.6 11/05/2018    WBC 7.12 11/05/2018    HGB 13.2 (L) 11/05/2018    HCT 39.3 (L) 11/05/2018    MCV 90 " 11/05/2018     11/05/2018    INR 1.0 08/26/2016    TSH 2.588 11/01/2018    CHOL 154 10/17/2017    HDL 49 10/17/2017    LDLCALC 91.8 10/17/2017    TRIG 66 10/17/2017     (H) 11/05/2018     Assessment:      1. Symptomatic bradycardia    2. PAF (paroxysmal atrial fibrillation)    3. Essential hypertension    4. CKD (chronic kidney disease) stage 3, GFR 30-59 ml/min        Plan:   1. Afib v Slow VR   - SEND TO ER for admission/ will need PPM  - cont Eliquis 5 BID    2. HTN - elevated this AM  - low salt diet  - cont meds  - increase Norvasc    3. Dyspnea  - sec Afib slow response vs SAMUEL/pulm  - low salt    4. SAMUEL  - pt does not want to wear mask  - needs to be compliant     5. CKD3  - cont to monitor per PCP/renal    ER for admission/monitoring and PPM implant    Thank you for allowing me to participate in this patient's care. Please do not hesitate to contact me with any questions or concerns. Consult note has been forwarded to the referral physician.

## 2019-04-09 NOTE — HPI
Mr Morales is a 84 year old male with PMHx of HTN and A Fib who presented to [facility] Emergency Room after being seen in cardiology clinic with symptomatic bradycardia. He has been experiencing increase generalize weakness and fatigue. Pt wife is at bedside and assistance in answering question. Emergency Room spoke with Cardiology, Dr Garnett recommend admission and hold amlodipine. He currently denies chest pain, shortness of breath, syncope, dizziness, nausea, vomiting, fever or chills. He may need pacemaker placement. Patient is a full code, however does not want to be intubated long term. Surrogate decision is his wife Rosa Sanchez.

## 2019-04-09 NOTE — ED NOTES
Pt lying in bed in NAD,VSS,RR equal and unlabored. Bed is low, locked, and call light in reach. Side rails up x 2.  Wife at bedside.

## 2019-04-09 NOTE — ED NOTES
Pt does not speak english, however, his wife speaks english fluently and has been translating. Per wife, pt has no distress or discomfort.   Pt lying in bed in NAD,VSS,RR equal and unlabored. Bed is low, locked, and call light in reach. Side rails up x 2.  Per wife pt is to get a pacemaker placement tomorrow and the clinic sent him to the ER due to his HR being low. Pt is in junctional bradycardia at this time. No SOB or distress noted.

## 2019-04-10 LAB
ANION GAP SERPL CALC-SCNC: 7 MMOL/L (ref 8–16)
APTT BLDCRRT: 31.1 SEC (ref 21–32)
APTT BLDCRRT: 54.8 SEC (ref 21–32)
BASOPHILS # BLD AUTO: 0.02 K/UL (ref 0–0.2)
BASOPHILS NFR BLD: 0.3 % (ref 0–1.9)
BUN SERPL-MCNC: 28 MG/DL (ref 8–23)
CALCIUM SERPL-MCNC: 10.1 MG/DL (ref 8.7–10.5)
CHLORIDE SERPL-SCNC: 105 MMOL/L (ref 95–110)
CO2 SERPL-SCNC: 28 MMOL/L (ref 23–29)
CREAT SERPL-MCNC: 1.3 MG/DL (ref 0.5–1.4)
DIFFERENTIAL METHOD: ABNORMAL
EOSINOPHIL # BLD AUTO: 0.1 K/UL (ref 0–0.5)
EOSINOPHIL NFR BLD: 1.7 % (ref 0–8)
ERYTHROCYTE [DISTWIDTH] IN BLOOD BY AUTOMATED COUNT: 15.3 % (ref 11.5–14.5)
EST. GFR  (AFRICAN AMERICAN): 58 ML/MIN/1.73 M^2
EST. GFR  (NON AFRICAN AMERICAN): 50 ML/MIN/1.73 M^2
GLUCOSE SERPL-MCNC: 99 MG/DL (ref 70–110)
HCT VFR BLD AUTO: 42.8 % (ref 40–54)
HGB BLD-MCNC: 14.2 G/DL (ref 14–18)
INR PPP: 1.1 (ref 0.8–1.2)
LYMPHOCYTES # BLD AUTO: 1.9 K/UL (ref 1–4.8)
LYMPHOCYTES NFR BLD: 25.4 % (ref 18–48)
MCH RBC QN AUTO: 28.9 PG (ref 27–31)
MCHC RBC AUTO-ENTMCNC: 33.2 G/DL (ref 32–36)
MCV RBC AUTO: 87 FL (ref 82–98)
MONOCYTES # BLD AUTO: 0.6 K/UL (ref 0.3–1)
MONOCYTES NFR BLD: 8.1 % (ref 4–15)
NEUTROPHILS # BLD AUTO: 4.9 K/UL (ref 1.8–7.7)
NEUTROPHILS NFR BLD: 64.5 % (ref 38–73)
PLATELET # BLD AUTO: 146 K/UL (ref 150–350)
PMV BLD AUTO: 10.6 FL (ref 9.2–12.9)
POTASSIUM SERPL-SCNC: 4.5 MMOL/L (ref 3.5–5.1)
PROTHROMBIN TIME: 11.4 SEC (ref 9–12.5)
RBC # BLD AUTO: 4.92 M/UL (ref 4.6–6.2)
SODIUM SERPL-SCNC: 140 MMOL/L (ref 136–145)
TROPONIN I SERPL DL<=0.01 NG/ML-MCNC: 0.03 NG/ML (ref 0–0.03)
WBC # BLD AUTO: 7.57 K/UL (ref 3.9–12.7)

## 2019-04-10 PROCEDURE — 36415 COLL VENOUS BLD VENIPUNCTURE: CPT

## 2019-04-10 PROCEDURE — 25000003 PHARM REV CODE 250: Performed by: NURSE PRACTITIONER

## 2019-04-10 PROCEDURE — 84484 ASSAY OF TROPONIN QUANT: CPT

## 2019-04-10 PROCEDURE — 99222 1ST HOSP IP/OBS MODERATE 55: CPT | Mod: ,,, | Performed by: INTERNAL MEDICINE

## 2019-04-10 PROCEDURE — 85730 THROMBOPLASTIN TIME PARTIAL: CPT

## 2019-04-10 PROCEDURE — 63600175 PHARM REV CODE 636 W HCPCS: Performed by: NURSE PRACTITIONER

## 2019-04-10 PROCEDURE — 85610 PROTHROMBIN TIME: CPT

## 2019-04-10 PROCEDURE — 85025 COMPLETE CBC W/AUTO DIFF WBC: CPT

## 2019-04-10 PROCEDURE — 21400001 HC TELEMETRY ROOM

## 2019-04-10 PROCEDURE — 99222 PR INITIAL HOSPITAL CARE,LEVL II: ICD-10-PCS | Mod: ,,, | Performed by: INTERNAL MEDICINE

## 2019-04-10 PROCEDURE — 80048 BASIC METABOLIC PNL TOTAL CA: CPT

## 2019-04-10 PROCEDURE — 85730 THROMBOPLASTIN TIME PARTIAL: CPT | Mod: 91

## 2019-04-10 RX ORDER — HEPARIN SODIUM,PORCINE/D5W 25000/250
12 INTRAVENOUS SOLUTION INTRAVENOUS CONTINUOUS
Status: DISCONTINUED | OUTPATIENT
Start: 2019-04-10 | End: 2019-04-12

## 2019-04-10 RX ORDER — HYDRALAZINE HYDROCHLORIDE 20 MG/ML
10 INJECTION INTRAMUSCULAR; INTRAVENOUS EVERY 8 HOURS PRN
Status: DISCONTINUED | OUTPATIENT
Start: 2019-04-10 | End: 2019-04-13 | Stop reason: HOSPADM

## 2019-04-10 RX ORDER — HYDRALAZINE HYDROCHLORIDE 25 MG/1
25 TABLET, FILM COATED ORAL EVERY 8 HOURS
Status: DISCONTINUED | OUTPATIENT
Start: 2019-04-10 | End: 2019-04-11

## 2019-04-10 RX ADMIN — NITROGLYCERIN 1 INCH: 20 OINTMENT TOPICAL at 12:04

## 2019-04-10 RX ADMIN — LIDOCAINE HYDROCHLORIDE 50 ML: 20 SOLUTION ORAL; TOPICAL at 11:04

## 2019-04-10 RX ADMIN — TRAZODONE HYDROCHLORIDE 150 MG: 100 TABLET ORAL at 09:04

## 2019-04-10 RX ADMIN — SERTRALINE HYDROCHLORIDE 100 MG: 50 TABLET ORAL at 09:04

## 2019-04-10 RX ADMIN — NITROGLYCERIN 1 INCH: 20 OINTMENT TOPICAL at 06:04

## 2019-04-10 RX ADMIN — HYDRALAZINE HYDROCHLORIDE 10 MG: 20 INJECTION INTRAMUSCULAR; INTRAVENOUS at 09:04

## 2019-04-10 RX ADMIN — HYDRALAZINE HYDROCHLORIDE 25 MG: 25 TABLET, FILM COATED ORAL at 09:04

## 2019-04-10 RX ADMIN — HYDRALAZINE HYDROCHLORIDE 25 MG: 25 TABLET, FILM COATED ORAL at 02:04

## 2019-04-10 RX ADMIN — HEPARIN SODIUM AND DEXTROSE 12 UNITS/KG/HR: 10000; 5 INJECTION INTRAVENOUS at 01:04

## 2019-04-10 RX ADMIN — NITROGLYCERIN 1 INCH: 20 OINTMENT TOPICAL at 05:04

## 2019-04-10 RX ADMIN — NITROGLYCERIN 1 INCH: 20 OINTMENT TOPICAL at 11:04

## 2019-04-10 NOTE — H&P
[facility] Hospital Medicine  History & Physical    Patient Name: Santos Morales  MRN: [id number]  Admission Date: 4/9/2019  Attending Physician: Aleshia Holland MD   Primary Care Provider: Diane Bowen MD         Patient information was obtained from patient, spouse/SO and ER records.     Subjective:     Principal Problem:Symptomatic bradycardia    Chief Complaint:   Chief Complaint   Patient presents with    Admission for pacemaker placement     sent from cards for pacemaker placement        HPI: Mr Morales is a 84 year old male with PMHx of HTN and A Fib who presented to [facility] Emergency Room after being seen in cardiology clinic with symptomatic bradycardia. He has been experiencing increase generalize weakness and fatigue. Pt wife is at bedside and assistance in answering question. Emergency Room spoke with Cardiology, Dr Garnett recommend admission and hold amlodipine. He currently denies chest pain, shortness of breath, syncope, dizziness, nausea, vomiting, fever or chills. He may need pacemaker placement. Patient is a full code, however does not want to be intubated long term. Surrogate decision is his wife Rosa Sanchez.     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 Hardy, Cardiology    SAMUEL (obstructive sleep apnea)        Past Surgical History:   Procedure Laterality Date    BACK SURGERY      CATARACT EXTRACTION W/  INTRAOCULAR LENS IMPLANT Bilateral        Review of patient's allergies indicates:  No Known Allergies    No current facility-administered medications on file prior to encounter.      Current Outpatient Medications on File Prior to Encounter   Medication Sig    amLODIPine (NORVASC) 10 MG tablet Take 1 tablet (10 mg total) by mouth once daily.    apixaban (ELIQUIS) 5 mg Tab Take 1 tablet (5 mg total) by mouth 2  (two) times daily.    clonazePAM (KLONOPIN) 0.5 MG tablet Take 1 tablet (0.5 mg total) by mouth every evening.    furosemide (LASIX) 40 MG tablet Take 1 tablet (40 mg total) by mouth daily as needed. Take next 3 days (Patient taking differently: Take 40 mg by mouth once daily. Take next 3 days)    melatonin 1 mg Tab Take 1 tablet by mouth every evening.    multivit-min/FA/lycopen/lutein (CENTRUM SILVER MEN ORAL) Take 1 tablet by mouth once daily.    sertraline (ZOLOFT) 100 MG tablet Take 1 tablet (100 mg total) by mouth once daily.    sertraline (ZOLOFT) 50 MG tablet 1 tab po qd for a total of 150 mg daily    traZODone (DESYREL) 150 MG tablet TAKE ONE TABLET BY MOUTH ONCE DAILY IN THE EVENING    ginkgo biloba 120 mg Tab Take 1 tablet by mouth once daily.     [DISCONTINUED] amLODIPine (NORVASC) 5 MG tablet Take 1 tablet (5 mg total) by mouth once daily.     Family History     Problem Relation (Age of Onset)    Cancer Father    Diabetes Paternal Grandmother        Tobacco Use    Smoking status: Never Smoker    Smokeless tobacco: Never Used   Substance and Sexual Activity    Alcohol use: Yes     Comment: socially     Drug use: No    Sexual activity: Not on file     Review of Systems   Constitutional: Positive for activity change and fatigue. Negative for appetite change, chills, diaphoresis and fever.   HENT: Negative for congestion, ear discharge, rhinorrhea, sinus pressure, sore throat and trouble swallowing.    Eyes: Negative for discharge and visual disturbance.   Respiratory: Negative for apnea, cough, choking, chest tightness, shortness of breath, wheezing and stridor.    Cardiovascular: Negative for chest pain, palpitations and leg swelling.   Gastrointestinal: Negative for abdominal distention, abdominal pain, diarrhea, nausea and vomiting.   Endocrine: Negative for cold intolerance and heat intolerance.   Genitourinary: Negative for dysuria, frequency and hematuria.   Musculoskeletal: Negative  for arthralgias, back pain, myalgias and neck pain.   Skin: Negative for pallor and rash.   Neurological: Positive for weakness. Negative for dizziness, seizures, syncope and headaches.   Psychiatric/Behavioral: Negative for agitation, confusion and sleep disturbance.     Objective:     Vital Signs (Most Recent):  Temp: 98.2 °F (36.8 °C) (04/09/19 1648)  Pulse: (!) 40 (04/09/19 1730)  Resp: 20 (04/09/19 1730)  BP: (!) 189/80 (04/09/19 1730)  SpO2: 95 % (04/09/19 1730) Vital Signs (24h Range):  Temp:  [97.9 °F (36.6 °C)-98.2 °F (36.8 °C)] 98.2 °F (36.8 °C)  Pulse:  [40-47] 40  Resp:  [17-20] 20  SpO2:  [95 %-99 %] 95 %  BP: (160-192)/(50-80) 189/80     Weight: 93 kg (205 lb 0.4 oz)  Body mass index is 31.17 kg/m².    Physical Exam   Constitutional: He is oriented to person, place, and time. No distress.   HENT:   Mouth/Throat: No oropharyngeal exudate.   Eyes: Right eye exhibits no discharge. Left eye exhibits no discharge.   Cardiovascular: Bradycardia present. Exam reveals no gallop and no friction rub.   No murmur heard.  Pulmonary/Chest: No stridor. No respiratory distress. He has no wheezes. He has no rales. He exhibits no tenderness.   Abdominal: He exhibits no distension and no mass. There is no tenderness. There is no rebound and no guarding. No hernia.   Musculoskeletal: He exhibits no edema or deformity.   Neurological: He is alert and oriented to person, place, and time.   Skin: Skin is warm and dry. Capillary refill takes less than 2 seconds. He is not diaphoretic.   Psychiatric: He has a normal mood and affect.   Nursing note and vitals reviewed.          Significant Labs:   CBC:   Recent Labs   Lab 04/09/19  1613   WBC 7.11   HGB 12.7*   HCT 38.6*   *     CMP:   Recent Labs   Lab 04/09/19  1613      K 4.4      CO2 30*      BUN 38*   CREATININE 1.6*   CALCIUM 9.8   PROT 6.5   ALBUMIN 3.8   BILITOT 0.7   ALKPHOS 54*   AST 21   ALT 19   ANIONGAP 9   EGFRNONAA 39*     Significant  Imaging:     Imaging Results          X-Ray Chest AP Portable (Final result)  Result time 04/09/19 16:52:11    Final result by Riki Leon MD (04/09/19 16:52:11)                 Impression:      No acute abnormality.      Electronically signed by: Riki Leon  Date:    04/09/2019  Time:    16:52             Narrative:    EXAMINATION:  XR CHEST AP PORTABLE    CLINICAL HISTORY:  bradycardia;.    TECHNIQUE:  Single frontal portable view of the chest was performed.    COMPARISON:  11/05/2018    FINDINGS:  Support devices: None    The lungs are clear, with normal appearance of pulmonary vasculature and no  pneumothorax.  Suggestion of tiny right pleural effusion unchanged.    Unchanged cardiomegaly.    Bones are intact.                              Assessment/Plan:     * Symptomatic bradycardia  Place in observation  Cardiology Cardiology   Hold amlodipine   NPO p MN possible PPM placement  Atropine as needed symptomatic bradycardia       Elevated brain natriuretic peptide (BNP) level    No current signs of ADHF   Monitor       PAF (paroxysmal atrial fibrillation)  Continue Eliquis       CKD (chronic kidney disease) stage 3, GFR 30-59 ml/min  GFR around baseline   Monitor renal function daily       Essential hypertension  Hold amlodipine   Hydralazine 20 mg as needed SBP > 170      Depression  Continue home Trazodone and Zoloft       VTE Risk Mitigation (From admission, onward)        Ordered     apixaban tablet 2.5 mg  2 times daily      04/09/19 1726     IP VTE HIGH RISK PATIENT  Once      04/09/19 1726     Place sequential compression device  Until discontinued      04/09/19 1726             Saad Yañez NP  Department of Hospital Medicine   [facility]

## 2019-04-10 NOTE — PLAN OF CARE
Problem: Adult Inpatient Plan of Care  Goal: Plan of Care Review  Outcome: Ongoing (interventions implemented as appropriate)  Patient admitted and, along with patient's wife, oriented to the unit. Patient's wife serving as an  for the patient. Patient understands that he is not to eat or drink anything at this time (as of midnight) in preparation for pacemaker insertion. Patient and patient's wife agree to contact nurse should patient experience any new or worsening symptoms. Will continue to monitor.

## 2019-04-10 NOTE — CONSULTS
[facility]  Cardiology  Consult Note    Patient Name: Santos Morales  MRN: [id number]  Admission Date: 4/9/2019  Hospital Length of Stay: 0 days  Code Status: Full Code   Attending Provider: Joe Strauss, *   Consulting Provider: ALEC Hitchcock  Primary Care Physician: Diane Bowen MD  Principal Problem:Symptomatic bradycardia    Patient information was obtained from patient and ER records.     Inpatient consult to Cardiology  Consult performed by: ALEC Hitchcock  Consult ordered by: Aleshia Holland MD  Reason for consult: Bradycardia         Subjective:     Chief Complaint:  Fatigue      HPI:   Mr. Morales is a 85 y/o male with PMH of A-fib, HTN, SAMUEL, Morbitz type I AV block, followed by Dr. Hardy. Patient presented to the ED with complaints of fatigue and occasional dizziness. Has occasional chest pain, but improved with eating and belching. EKG showed junctional bradycardia HR 41. Troponin 0.020, 0.028 Takes amlodipine at home.     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 Hardy, Cardiology    SAMUEL (obstructive sleep apnea)        Past Surgical History:   Procedure Laterality Date    BACK SURGERY      CATARACT EXTRACTION W/  INTRAOCULAR LENS IMPLANT Bilateral        Review of patient's allergies indicates:  No Known Allergies    No current facility-administered medications on file prior to encounter.      Current Outpatient Medications on File Prior to Encounter   Medication Sig    amLODIPine (NORVASC) 10 MG tablet Take 1 tablet (10 mg total) by mouth once daily.    apixaban (ELIQUIS) 5 mg Tab Take 1 tablet (5 mg total) by mouth 2 (two) times daily.    clonazePAM (KLONOPIN) 0.5 MG tablet Take 1 tablet (0.5 mg total) by mouth every evening.    furosemide (LASIX) 40 MG tablet Take 1 tablet (40 mg total) by  mouth daily as needed. Take next 3 days (Patient taking differently: Take 40 mg by mouth once daily. Take next 3 days)    melatonin 1 mg Tab Take 1 tablet by mouth every evening.    multivit-min/FA/lycopen/lutein (CENTRUM SILVER MEN ORAL) Take 1 tablet by mouth once daily.    sertraline (ZOLOFT) 100 MG tablet Take 1 tablet (100 mg total) by mouth once daily.    sertraline (ZOLOFT) 50 MG tablet 1 tab po qd for a total of 150 mg daily    traZODone (DESYREL) 150 MG tablet TAKE ONE TABLET BY MOUTH ONCE DAILY IN THE EVENING    ginkgo biloba 120 mg Tab Take 1 tablet by mouth once daily.      Family History     Problem Relation (Age of Onset)    Cancer Father    Diabetes Paternal Grandmother        Tobacco Use    Smoking status: Never Smoker    Smokeless tobacco: Never Used   Substance and Sexual Activity    Alcohol use: Yes     Comment: socially     Drug use: No    Sexual activity: Not on file     Review of Systems   Constitution: Positive for malaise/fatigue. Negative for diaphoresis, weight gain and weight loss.   HENT: Negative for congestion and nosebleeds.    Cardiovascular: Positive for chest pain. Negative for claudication, cyanosis, dyspnea on exertion, irregular heartbeat, leg swelling, near-syncope, orthopnea, palpitations, paroxysmal nocturnal dyspnea and syncope.   Respiratory: Negative for cough, hemoptysis, shortness of breath, sleep disturbances due to breathing, snoring, sputum production and wheezing.    Hematologic/Lymphatic: Negative for bleeding problem. Does not bruise/bleed easily.   Skin: Negative for rash.   Musculoskeletal: Negative for arthritis, back pain, falls, joint pain, muscle cramps and muscle weakness.   Gastrointestinal: Negative for abdominal pain, constipation, diarrhea, heartburn, hematemesis, hematochezia, melena and nausea.   Genitourinary: Negative for dysuria, hematuria and nocturia.   Neurological: Positive for dizziness. Negative for excessive daytime sleepiness,  headaches, light-headedness, loss of balance, numbness, vertigo and weakness.     Objective:     Vital Signs (Most Recent):  Temp: 96.9 °F (36.1 °C) (04/10/19 1056)  Pulse: (!) 56 (04/10/19 1056)  Resp: 18 (04/10/19 1056)  BP: (!) 202/79 (04/10/19 1056)  SpO2: (!) 93 % (04/10/19 1056) Vital Signs (24h Range):  Temp:  [96.4 °F (35.8 °C)-98.4 °F (36.9 °C)] 96.9 °F (36.1 °C)  Pulse:  [40-56] 56  Resp:  [16-20] 18  SpO2:  [93 %-99 %] 93 %  BP: (160-223)/(66-84) 202/79     Weight: 93 kg (205 lb 0.4 oz)  Body mass index is 31.17 kg/m².    SpO2: (!) 93 %  O2 Device (Oxygen Therapy): nasal cannula      Intake/Output Summary (Last 24 hours) at 4/10/2019 1119  Last data filed at 4/10/2019 0600  Gross per 24 hour   Intake --   Output 1050 ml   Net -1050 ml       Lines/Drains/Airways     Peripheral Intravenous Line                 Peripheral IV - Single Lumen 04/09/19 1612 Right Antecubital less than 1 day                Physical Exam   Constitutional: He is oriented to person, place, and time. He appears well-developed and well-nourished.   Neck: Neck supple. No JVD present.   Cardiovascular: Regular rhythm, normal heart sounds and normal pulses. Bradycardia present. Exam reveals no friction rub.   No murmur heard.  Pulmonary/Chest: Effort normal and breath sounds normal. No respiratory distress. He has no wheezes. He has no rales.   Abdominal: Soft. Bowel sounds are normal. He exhibits no distension.   Musculoskeletal: He exhibits no edema or tenderness.   Neurological: He is alert and oriented to person, place, and time.   Skin: Skin is warm and dry. No rash noted.   Psychiatric: He has a normal mood and affect. His behavior is normal.   Nursing note and vitals reviewed.      Significant Labs:   All pertinent lab results from the last 24 hours have been reviewed. and   Recent Lab Results       04/10/19  0954   04/09/19  1613        Albumin   3.8     Alkaline Phosphatase   54     ALT   19     Anion Gap 7 9     Appearance, UA    Clear     AST   21     Baso # 0.02 0.03     Basophil% 0.3 0.4     Bilirubin (UA)   Negative     Total Bilirubin   0.7  Comment:  For infants and newborns, interpretation of results should be based  on gestational age, weight and in agreement with clinical  observations.  Premature Infant recommended reference ranges:  Up to 24 hours.............<8.0 mg/dL  Up to 48 hours............<12.0 mg/dL  3-5 days..................<15.0 mg/dL  6-29 days.................<15.0 mg/dL       BNP   859  Comment:  Values of less than 100 pg/ml are consistent with non-CHF populations.     BUN, Bld 28 38     Calcium 10.1 9.8     Chloride 105 103     CO2 28 30     Color, UA   Yellow     CPK   57     Creatinine 1.3 1.6     Differential Method Automated Automated     eGFR if  58 45     eGFR if non  50  Comment:  Calculation used to obtain the estimated glomerular filtration  rate (eGFR) is the CKD-EPI equation.    39  Comment:  Calculation used to obtain the estimated glomerular filtration  rate (eGFR) is the CKD-EPI equation.        Eos # 0.1 0.2     Eosinophil% 1.7 2.3     Glucose 99 100     Glucose, UA   Negative     Gran # (ANC) 4.9 4.2     Gran% 64.5 58.9     Hematocrit 42.8 38.6     Hemoglobin 14.2 12.7     Ketones, UA   Negative     Leukocytes, UA   Negative     Lymph # 1.9 2.2     Lymph% 25.4 30.8     MCH 28.9 28.7     MCHC 33.2 32.9     MCV 87 87     Mono # 0.6 0.5     Mono% 8.1 7.6     MPV 10.6 11.2     Nitrite, UA   Negative     Occult Blood UA   Negative     pH, UA   6.0     Platelets 146 143     Potassium 4.5 4.4     Total Protein   6.5     Protein, UA   Negative  Comment:  Recommend a 24 hour urine protein or a urine   protein/creatinine ratio if globulin induced proteinuria is  clinically suspected.       RBC 4.92 4.42     RDW 15.3 15.5     Sodium 140 142     Specific Gravity, UA   1.010     Specimen UA   Urine, Clean Catch     Troponin I 0.028  Comment:  The reference interval for Troponin  I represents the 99th percentile   cutoff   for our facility and is consistent with 3rd generation assay   performance.   0.020  Comment:  The reference interval for Troponin I represents the 99th percentile   cutoff   for our facility and is consistent with 3rd generation assay   performance.       Urobilinogen, UA   Negative     WBC 7.57 7.11           Significant Imaging: Echocardiogram:   2D echo with color flow doppler:   Results for orders placed or performed in visit on 11/15/18   2D Echo w/ Color Flow Doppler   Result Value Ref Range    QEF 60 55 - 65    Mitral Valve Regurgitation TRIVIAL     Aortic Valve Regurgitation MILD TO MODERATE (A)     Est. PA Systolic Pressure 28.79     and X-Ray:     FINDINGS:  Support devices: None    The lungs are clear, with normal appearance of pulmonary vasculature and no  pneumothorax.  Suggestion of tiny right pleural effusion unchanged.    Unchanged cardiomegaly.    Bones are intact.      Impression       No acute abnormality.      Electronically signed by: Riki Leon  Date: 04/09/2019  Time: 16:52         Assessment and Plan:     * Symptomatic bradycardia  EKG shows junctional bradycardia 40's.   Advised to hold amlodipine for 36-48 hours before determining if PPM indicated.   Will monitor for now     PAF (paroxysmal atrial fibrillation)  BB, CCB on hold to avoid further bradycardia.   Takes Eliqius at home for CVA prophylaxis  Recommend Heparin gtt for now until decision made regarding device implant         VTE Risk Mitigation (From admission, onward)        Ordered     IP VTE HIGH RISK PATIENT  Once      04/09/19 1726     Place sequential compression device  Until discontinued      04/09/19 1726      Chart reviewed. Patient examined by Dr. Garnett and agrees with plan that has been outlined.       Thank you for your consult. I will follow-up with patient. Please contact us if you have any additional questions.    ALEC Hitchcock  Cardiology   [facility]

## 2019-04-10 NOTE — PLAN OF CARE
Met with pt for assessment.  Pt admitted secondary to chronic anticoagulation, fatigue, gait instability.  Prior to admit pt lived at home with spouse and was independent with ADLs.  Pt expected to receive pacemaker during hospitalization and spouse is unsure what pt's needs will be upon discharge.  Pt may benefit from DME such as oxygen and/or rolling walker at discharge.  Pt spouse wants bedside delivery from pharmacy if needed.  Transportation to and from medical appointments provided by spouse.  Per spouse, pt has hx of insomnia and depression with onset at age 18.  Spouse denies pt has any hx of suicide attempts. CM provided a transitional care folder, information on advanced directives, information on pharmacy bedside delivery, and discharge planning begins on admission with contact information for any needs/questions.    D/C plan: pending pacemaker placement       04/10/19 2616   Discharge Assessment   Assessment Type Discharge Planning Assessment   Confirmed/corrected address and phone number on facesheet? Yes   Assessment information obtained from? Caregiver   Prior to hospitilization cognitive status: Alert/Oriented   Prior to hospitalization functional status: Independent   Current cognitive status: Alert/Oriented   Current Functional Status: Assistive Equipment;Needs Assistance   Lives With spouse   Able to Return to Prior Arrangements yes   Is patient able to care for self after discharge? Unable to determine at this time (comments)   Who are your caregiver(s) and their phone number(s)? Rosa Chen, spouse: [phone]    Patient's perception of discharge disposition other (comments)  (unsure at this time)   Readmission Within the Last 30 Days no previous admission in last 30 days   Patient currently being followed by outpatient case management? No   Patient currently receives any other outside agency services? No   Equipment Currently Used at Home none   Do you have any problems affording any of  your prescribed medications? No   Is the patient taking medications as prescribed? yes   Does the patient have transportation home? Yes   Transportation Anticipated family or friend will provide   Does the patient receive services at the [facility]? No   Discharge Plan A Home Health   DME Needed Upon Discharge  oxygen;walker, rolling   Patient/Family in Agreement with Plan yes

## 2019-04-10 NOTE — PROGRESS NOTES
MD to bedside to discuss in Thai about the heparin infusion. He explained the indication, administration process and lab collections required. He also stressed the importance of safety while on the heparin gtt. He explained to the pt that it he could only get out of bed with the nurses help and that if family left the room they where to inform the nurse prior to leaving so that bed alarm could be activated. opportunity for the pt and his spouse was provided. They verbalized understanding of the medication and state that if they leave the they would notify the nurse.  Bed alarm activated at this time since his spouse is leaving for today

## 2019-04-10 NOTE — SUBJECTIVE & OBJECTIVE
Interval History: Patient seen and examined. Hold amlodipine for bradycardia. Hydralazine PO started for HTN. Eliquis on hold. Heparin drip started.     Review of Systems   Constitutional: Positive for fatigue. Negative for chills, diaphoresis and fever.   HENT: Negative for congestion, ear discharge, rhinorrhea, sinus pressure, sore throat and trouble swallowing.    Eyes: Negative for discharge and visual disturbance.   Respiratory: Negative for apnea, cough, choking, chest tightness, shortness of breath, wheezing and stridor.    Cardiovascular: Negative for chest pain, palpitations and leg swelling.   Gastrointestinal: Negative for abdominal distention, abdominal pain, diarrhea, nausea and vomiting.   Endocrine: Negative for cold intolerance and heat intolerance.   Genitourinary: Negative for dysuria, frequency and hematuria.   Musculoskeletal: Negative for arthralgias, back pain, myalgias and neck pain.   Skin: Negative for pallor and rash.   Neurological: Positive for weakness. Negative for dizziness, seizures, syncope and headaches.   Psychiatric/Behavioral: Negative for agitation, confusion and sleep disturbance.     Objective:     Vital Signs (Most Recent):  Temp: 96.9 °F (36.1 °C) (04/10/19 1056)  Pulse: (!) 56 (04/10/19 1056)  Resp: 18 (04/10/19 1056)  BP: (!) 202/79 (04/10/19 1056)  SpO2: (!) 93 % (04/10/19 1056) Vital Signs (24h Range):  Temp:  [96.4 °F (35.8 °C)-98.4 °F (36.9 °C)] 96.9 °F (36.1 °C)  Pulse:  [40-56] 56  Resp:  [16-20] 18  SpO2:  [93 %-99 %] 93 %  BP: (160-223)/(66-84) 202/79     Weight: 93 kg (205 lb 0.4 oz)  Body mass index is 31.17 kg/m².    Intake/Output Summary (Last 24 hours) at 4/10/2019 1211  Last data filed at 4/10/2019 0600  Gross per 24 hour   Intake --   Output 1050 ml   Net -1050 ml      Physical Exam   Constitutional: No distress.   Eyes: Right eye exhibits no discharge. Left eye exhibits no discharge.   Cardiovascular: An irregular rhythm present. Bradycardia present. Exam  reveals no gallop and no friction rub.   No murmur heard.  Pulmonary/Chest: No stridor. No respiratory distress. He has no wheezes. He has no rales.   Abdominal: He exhibits no distension and no mass. There is no tenderness. There is no rebound and no guarding. No hernia.   Musculoskeletal: He exhibits no edema or deformity.   Neurological: He is alert.   Skin: Skin is warm and dry. Capillary refill takes less than 2 seconds. He is not diaphoretic.   Nursing note and vitals reviewed.      Significant Labs:   CBC:   Recent Labs   Lab 04/09/19  1613 04/10/19  0954   WBC 7.11 7.57   HGB 12.7* 14.2   HCT 38.6* 42.8   * 146*     CMP:   Recent Labs   Lab 04/09/19  1613 04/10/19  0954    140   K 4.4 4.5    105   CO2 30* 28    99   BUN 38* 28*   CREATININE 1.6* 1.3   CALCIUM 9.8 10.1   PROT 6.5  --    ALBUMIN 3.8  --    BILITOT 0.7  --    ALKPHOS 54*  --    AST 21  --    ALT 19  --    ANIONGAP 9 7*   EGFRNONAA 39* 50*     Cardiac Markers:   Recent Labs   Lab 04/09/19  1613   *       Significant Imaging:     Imaging Results          X-Ray Chest AP Portable (Final result)  Result time 04/09/19 16:52:11    Final result by Riki Leon MD (04/09/19 16:52:11)                 Impression:      No acute abnormality.      Electronically signed by: Riki Leon  Date:    04/09/2019  Time:    16:52             Narrative:    EXAMINATION:  XR CHEST AP PORTABLE    CLINICAL HISTORY:  bradycardia;.    TECHNIQUE:  Single frontal portable view of the chest was performed.    COMPARISON:  11/05/2018    FINDINGS:  Support devices: None    The lungs are clear, with normal appearance of pulmonary vasculature and no  pneumothorax.  Suggestion of tiny right pleural effusion unchanged.    Unchanged cardiomegaly.    Bones are intact.

## 2019-04-10 NOTE — ASSESSMENT & PLAN NOTE
BB, CCB on hold to avoid further bradycardia.   Takes Eliqius at home for CVA prophylaxis  Recommend Heparin gtt for now until decision made regarding device implant

## 2019-04-10 NOTE — ED NOTES
RN called tele unit to give report to nurse for room 205, place on hold by MARK Price. Lashonda answered and asked if we can call back in 10-15 min.

## 2019-04-10 NOTE — SUBJECTIVE & OBJECTIVE
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 Hardy, Cardiology    SAMUEL (obstructive sleep apnea)        Past Surgical History:   Procedure Laterality Date    BACK SURGERY      CATARACT EXTRACTION W/  INTRAOCULAR LENS IMPLANT Bilateral        Review of patient's allergies indicates:  No Known Allergies    No current facility-administered medications on file prior to encounter.      Current Outpatient Medications on File Prior to Encounter   Medication Sig    amLODIPine (NORVASC) 10 MG tablet Take 1 tablet (10 mg total) by mouth once daily.    apixaban (ELIQUIS) 5 mg Tab Take 1 tablet (5 mg total) by mouth 2 (two) times daily.    clonazePAM (KLONOPIN) 0.5 MG tablet Take 1 tablet (0.5 mg total) by mouth every evening.    furosemide (LASIX) 40 MG tablet Take 1 tablet (40 mg total) by mouth daily as needed. Take next 3 days (Patient taking differently: Take 40 mg by mouth once daily. Take next 3 days)    melatonin 1 mg Tab Take 1 tablet by mouth every evening.    multivit-min/FA/lycopen/lutein (CENTRUM SILVER MEN ORAL) Take 1 tablet by mouth once daily.    sertraline (ZOLOFT) 100 MG tablet Take 1 tablet (100 mg total) by mouth once daily.    sertraline (ZOLOFT) 50 MG tablet 1 tab po qd for a total of 150 mg daily    traZODone (DESYREL) 150 MG tablet TAKE ONE TABLET BY MOUTH ONCE DAILY IN THE EVENING    ginkgo biloba 120 mg Tab Take 1 tablet by mouth once daily.      Family History     Problem Relation (Age of Onset)    Cancer Father    Diabetes Paternal Grandmother        Tobacco Use    Smoking status: Never Smoker    Smokeless tobacco: Never Used   Substance and Sexual Activity    Alcohol use: Yes     Comment: socially     Drug use: No    Sexual activity: Not on file     Review of Systems   Constitution: Positive for malaise/fatigue. Negative for diaphoresis,  weight gain and weight loss.   HENT: Negative for congestion and nosebleeds.    Cardiovascular: Positive for chest pain. Negative for claudication, cyanosis, dyspnea on exertion, irregular heartbeat, leg swelling, near-syncope, orthopnea, palpitations, paroxysmal nocturnal dyspnea and syncope.   Respiratory: Negative for cough, hemoptysis, shortness of breath, sleep disturbances due to breathing, snoring, sputum production and wheezing.    Hematologic/Lymphatic: Negative for bleeding problem. Does not bruise/bleed easily.   Skin: Negative for rash.   Musculoskeletal: Negative for arthritis, back pain, falls, joint pain, muscle cramps and muscle weakness.   Gastrointestinal: Negative for abdominal pain, constipation, diarrhea, heartburn, hematemesis, hematochezia, melena and nausea.   Genitourinary: Negative for dysuria, hematuria and nocturia.   Neurological: Positive for dizziness. Negative for excessive daytime sleepiness, headaches, light-headedness, loss of balance, numbness, vertigo and weakness.     Objective:     Vital Signs (Most Recent):  Temp: 96.9 °F (36.1 °C) (04/10/19 1056)  Pulse: (!) 56 (04/10/19 1056)  Resp: 18 (04/10/19 1056)  BP: (!) 202/79 (04/10/19 1056)  SpO2: (!) 93 % (04/10/19 1056) Vital Signs (24h Range):  Temp:  [96.4 °F (35.8 °C)-98.4 °F (36.9 °C)] 96.9 °F (36.1 °C)  Pulse:  [40-56] 56  Resp:  [16-20] 18  SpO2:  [93 %-99 %] 93 %  BP: (160-223)/(66-84) 202/79     Weight: 93 kg (205 lb 0.4 oz)  Body mass index is 31.17 kg/m².    SpO2: (!) 93 %  O2 Device (Oxygen Therapy): nasal cannula      Intake/Output Summary (Last 24 hours) at 4/10/2019 1119  Last data filed at 4/10/2019 0600  Gross per 24 hour   Intake --   Output 1050 ml   Net -1050 ml       Lines/Drains/Airways     Peripheral Intravenous Line                 Peripheral IV - Single Lumen 04/09/19 1612 Right Antecubital less than 1 day                Physical Exam   Constitutional: He is oriented to person, place, and time. He appears  well-developed and well-nourished.   Neck: Neck supple. No JVD present.   Cardiovascular: Regular rhythm, normal heart sounds and normal pulses. Bradycardia present. Exam reveals no friction rub.   No murmur heard.  Pulmonary/Chest: Effort normal and breath sounds normal. No respiratory distress. He has no wheezes. He has no rales.   Abdominal: Soft. Bowel sounds are normal. He exhibits no distension.   Musculoskeletal: He exhibits no edema or tenderness.   Neurological: He is alert and oriented to person, place, and time.   Skin: Skin is warm and dry. No rash noted.   Psychiatric: He has a normal mood and affect. His behavior is normal.   Nursing note and vitals reviewed.      Significant Labs:   All pertinent lab results from the last 24 hours have been reviewed. and   Recent Lab Results       04/10/19  0954   04/09/19  1613        Albumin   3.8     Alkaline Phosphatase   54     ALT   19     Anion Gap 7 9     Appearance, UA   Clear     AST   21     Baso # 0.02 0.03     Basophil% 0.3 0.4     Bilirubin (UA)   Negative     Total Bilirubin   0.7  Comment:  For infants and newborns, interpretation of results should be based  on gestational age, weight and in agreement with clinical  observations.  Premature Infant recommended reference ranges:  Up to 24 hours.............<8.0 mg/dL  Up to 48 hours............<12.0 mg/dL  3-5 days..................<15.0 mg/dL  6-29 days.................<15.0 mg/dL       BNP   859  Comment:  Values of less than 100 pg/ml are consistent with non-CHF populations.     BUN, Bld 28 38     Calcium 10.1 9.8     Chloride 105 103     CO2 28 30     Color, UA   Yellow     CPK   57     Creatinine 1.3 1.6     Differential Method Automated Automated     eGFR if  58 45     eGFR if non  50  Comment:  Calculation used to obtain the estimated glomerular filtration  rate (eGFR) is the CKD-EPI equation.    39  Comment:  Calculation used to obtain the estimated glomerular  filtration  rate (eGFR) is the CKD-EPI equation.        Eos # 0.1 0.2     Eosinophil% 1.7 2.3     Glucose 99 100     Glucose, UA   Negative     Gran # (ANC) 4.9 4.2     Gran% 64.5 58.9     Hematocrit 42.8 38.6     Hemoglobin 14.2 12.7     Ketones, UA   Negative     Leukocytes, UA   Negative     Lymph # 1.9 2.2     Lymph% 25.4 30.8     MCH 28.9 28.7     MCHC 33.2 32.9     MCV 87 87     Mono # 0.6 0.5     Mono% 8.1 7.6     MPV 10.6 11.2     Nitrite, UA   Negative     Occult Blood UA   Negative     pH, UA   6.0     Platelets 146 143     Potassium 4.5 4.4     Total Protein   6.5     Protein, UA   Negative  Comment:  Recommend a 24 hour urine protein or a urine   protein/creatinine ratio if globulin induced proteinuria is  clinically suspected.       RBC 4.92 4.42     RDW 15.3 15.5     Sodium 140 142     Specific Gravity, UA   1.010     Specimen UA   Urine, Clean Catch     Troponin I 0.028  Comment:  The reference interval for Troponin I represents the 99th percentile   cutoff   for our facility and is consistent with 3rd generation assay   performance.   0.020  Comment:  The reference interval for Troponin I represents the 99th percentile   cutoff   for our facility and is consistent with 3rd generation assay   performance.       Urobilinogen, UA   Negative     WBC 7.57 7.11           Significant Imaging: Echocardiogram:   2D echo with color flow doppler:   Results for orders placed or performed in visit on 11/15/18   2D Echo w/ Color Flow Doppler   Result Value Ref Range    QEF 60 55 - 65    Mitral Valve Regurgitation TRIVIAL     Aortic Valve Regurgitation MILD TO MODERATE (A)     Est. PA Systolic Pressure 28.79     and X-Ray:     FINDINGS:  Support devices: None    The lungs are clear, with normal appearance of pulmonary vasculature and no  pneumothorax.  Suggestion of tiny right pleural effusion unchanged.    Unchanged cardiomegaly.    Bones are intact.      Impression       No acute abnormality.      Electronically  signed by: Riki Leon  Date: 04/09/2019  Time: 16:52

## 2019-04-10 NOTE — HPI
Mr. Morales is a 85 y/o male with PMH of A-fib, HTN, SAMUEL, Morbitz type I AV block, followed by Dr. Hardy. Patient presented to the ED with complaints of fatigue and occasional dizziness. Has occasional chest pain, but improved with eating and belching. EKG showed junctional bradycardia HR 41. Troponin 0.020, 0.028 Takes amlodipine at home.

## 2019-04-10 NOTE — HOSPITAL COURSE
Patient is a 84 year old male who was admitted with symptomatic bradycardia from cardiac clinic on yesterday. He has history of Anxiety, A Fib, HTN and SAMUEL. Amlodipine has been discontinued on yesterday. Cardiology consult, recommend holding for 48 hours, stop Eliquis in anticipation for PPM placement. Heparin drip started for CVA protections for A Fib.  As of 4/11/19 patient's heart rate is improving while amlodipine is on hold. No plans for PPM today. Blood pressure elevated. Oral hydralazine ordered.  4/12 Pt hr now  Is between 45 to 55 . Cardiology is not planning  For PPM . The blood pressure has been elevated and received a dose of clonidine last night.  The blood pressure medication hydralazine was increased to 100 mg po tid.  He has a h/o dementia.  Substituted Hydralazine 100 mg three times daily and once daily Candesartan 8 mg for blood pressure control.  Removed Amlodipine.  Rate stable in the upper 50s and asymptomatic.  Discharge plan to continue inpatient medication regimen.  Follow up with Dr. Lugo Thursday for further evaluation and medical optimization.

## 2019-04-10 NOTE — ASSESSMENT & PLAN NOTE
EKG shows junctional bradycardia 40's.   Advised to hold amlodipine for 36-48 hours before determining if PPM indicated.   Will monitor for now

## 2019-04-10 NOTE — PLAN OF CARE
Problem: Adult Inpatient Plan of Care  Goal: Plan of Care Review  Outcome: Ongoing (interventions implemented as appropriate)  Plan of care reviewed with pt, spouse and daughter, all verbalized understanding. Pt remains free from falls this shift, call light within reach and pt encouraged to call for assistance. Pt remains free from skin breakdown, encouraged to turn and reposition frequently. Pts blood pressure has remained elevated throughout the day, heart rate has been consistently shannan and in a-fib. Pt was started on a heparin drip at 1400 today. Pt is receiving nitro ointment q6h. Pt is resting comfortably in bed at this time. Wife remains at the bedside. Will continue to monitor.

## 2019-04-10 NOTE — ASSESSMENT & PLAN NOTE
Cardiology following   Hold amlodipine 36-48 hours   Possible PPM placement   Continue to monitor

## 2019-04-10 NOTE — SUBJECTIVE & OBJECTIVE
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 Hardy, Cardiology    SAMUEL (obstructive sleep apnea)        Past Surgical History:   Procedure Laterality Date    BACK SURGERY      CATARACT EXTRACTION W/  INTRAOCULAR LENS IMPLANT Bilateral        Review of patient's allergies indicates:  No Known Allergies    No current facility-administered medications on file prior to encounter.      Current Outpatient Medications on File Prior to Encounter   Medication Sig    amLODIPine (NORVASC) 10 MG tablet Take 1 tablet (10 mg total) by mouth once daily.    apixaban (ELIQUIS) 5 mg Tab Take 1 tablet (5 mg total) by mouth 2 (two) times daily.    clonazePAM (KLONOPIN) 0.5 MG tablet Take 1 tablet (0.5 mg total) by mouth every evening.    furosemide (LASIX) 40 MG tablet Take 1 tablet (40 mg total) by mouth daily as needed. Take next 3 days (Patient taking differently: Take 40 mg by mouth once daily. Take next 3 days)    melatonin 1 mg Tab Take 1 tablet by mouth every evening.    multivit-min/FA/lycopen/lutein (CENTRUM SILVER MEN ORAL) Take 1 tablet by mouth once daily.    sertraline (ZOLOFT) 100 MG tablet Take 1 tablet (100 mg total) by mouth once daily.    sertraline (ZOLOFT) 50 MG tablet 1 tab po qd for a total of 150 mg daily    traZODone (DESYREL) 150 MG tablet TAKE ONE TABLET BY MOUTH ONCE DAILY IN THE EVENING    ginkgo biloba 120 mg Tab Take 1 tablet by mouth once daily.     [DISCONTINUED] amLODIPine (NORVASC) 5 MG tablet Take 1 tablet (5 mg total) by mouth once daily.     Family History     Problem Relation (Age of Onset)    Cancer Father    Diabetes Paternal Grandmother        Tobacco Use    Smoking status: Never Smoker    Smokeless tobacco: Never Used   Substance and Sexual Activity    Alcohol use: Yes     Comment: socially     Drug use: No    Sexual activity: Not on  file     Review of Systems   Constitutional: Positive for activity change and fatigue. Negative for appetite change, chills, diaphoresis and fever.   HENT: Negative for congestion, ear discharge, rhinorrhea, sinus pressure, sore throat and trouble swallowing.    Eyes: Negative for discharge and visual disturbance.   Respiratory: Negative for apnea, cough, choking, chest tightness, shortness of breath, wheezing and stridor.    Cardiovascular: Negative for chest pain, palpitations and leg swelling.   Gastrointestinal: Negative for abdominal distention, abdominal pain, diarrhea, nausea and vomiting.   Endocrine: Negative for cold intolerance and heat intolerance.   Genitourinary: Negative for dysuria, frequency and hematuria.   Musculoskeletal: Negative for arthralgias, back pain, myalgias and neck pain.   Skin: Negative for pallor and rash.   Neurological: Positive for weakness. Negative for dizziness, seizures, syncope and headaches.   Psychiatric/Behavioral: Negative for agitation, confusion and sleep disturbance.     Objective:     Vital Signs (Most Recent):  Temp: 98.2 °F (36.8 °C) (04/09/19 1648)  Pulse: (!) 40 (04/09/19 1730)  Resp: 20 (04/09/19 1730)  BP: (!) 189/80 (04/09/19 1730)  SpO2: 95 % (04/09/19 1730) Vital Signs (24h Range):  Temp:  [97.9 °F (36.6 °C)-98.2 °F (36.8 °C)] 98.2 °F (36.8 °C)  Pulse:  [40-47] 40  Resp:  [17-20] 20  SpO2:  [95 %-99 %] 95 %  BP: (160-192)/(50-80) 189/80     Weight: 93 kg (205 lb 0.4 oz)  Body mass index is 31.17 kg/m².    Physical Exam   Constitutional: He is oriented to person, place, and time. No distress.   HENT:   Mouth/Throat: No oropharyngeal exudate.   Eyes: Right eye exhibits no discharge. Left eye exhibits no discharge.   Cardiovascular: Bradycardia present. Exam reveals no gallop and no friction rub.   No murmur heard.  Pulmonary/Chest: No stridor. No respiratory distress. He has no wheezes. He has no rales. He exhibits no tenderness.   Abdominal: He exhibits no  distension and no mass. There is no tenderness. There is no rebound and no guarding. No hernia.   Musculoskeletal: He exhibits no edema or deformity.   Neurological: He is alert and oriented to person, place, and time.   Skin: Skin is warm and dry. Capillary refill takes less than 2 seconds. He is not diaphoretic.   Psychiatric: He has a normal mood and affect.   Nursing note and vitals reviewed.          Significant Labs:   CBC:   Recent Labs   Lab 04/09/19  1613   WBC 7.11   HGB 12.7*   HCT 38.6*   *     CMP:   Recent Labs   Lab 04/09/19  1613      K 4.4      CO2 30*      BUN 38*   CREATININE 1.6*   CALCIUM 9.8   PROT 6.5   ALBUMIN 3.8   BILITOT 0.7   ALKPHOS 54*   AST 21   ALT 19   ANIONGAP 9   EGFRNONAA 39*     Significant Imaging:     Imaging Results          X-Ray Chest AP Portable (Final result)  Result time 04/09/19 16:52:11    Final result by Riki Leon MD (04/09/19 16:52:11)                 Impression:      No acute abnormality.      Electronically signed by: Riki Leon  Date:    04/09/2019  Time:    16:52             Narrative:    EXAMINATION:  XR CHEST AP PORTABLE    CLINICAL HISTORY:  bradycardia;.    TECHNIQUE:  Single frontal portable view of the chest was performed.    COMPARISON:  11/05/2018    FINDINGS:  Support devices: None    The lungs are clear, with normal appearance of pulmonary vasculature and no  pneumothorax.  Suggestion of tiny right pleural effusion unchanged.    Unchanged cardiomegaly.    Bones are intact.

## 2019-04-10 NOTE — PROGRESS NOTES
[facility] Hospital Medicine  Progress Note    Patient Name: Santos Morales  MRN: [id number]  Patient Class: IP- Inpatient   Admission Date: 4/9/2019  Length of Stay: 0 days  Attending Physician: Joe Strauss, *  Primary Care Provider: Diane Bowen MD        Subjective:     Principal Problem:Symptomatic bradycardia    HPI:  Mr Morales is a 84 year old male with PMHx of HTN and A Fib who presented to [facility] after being seen in cardiology clinic with symptomatic bradycardia. He has been experiencing increase generalize weakness and fatigue. Pt wife is at bedside and assistance in answering question. Emergency Room spoke with Cardiology, Dr Garnett recommend admission and hold amlodipine. He currently denies chest pain, shortness of breath, syncope, dizziness, nausea, vomiting, fever or chills. He may need pacemaker placement. Patient is a full code, however does not want to be intubated long term. Surrogate decision is his wife Rosa Sanchez.     Hospital Course:  Patient is a 84 year old male who was admitted with symptomatic bradycardia from cardiac clinic on yesterday. He has history of Anxiety, A Fib, HTN and SAMUEL. Amlodipine has been discontinued on yesterday. Cardiology consult, recommend holding for 48 hours, stop Eliquis in anticipation for PPM placement. Heparin drip started for CVA protections for A Fib.      Interval History: Patient seen and examined. Hold amlodipine for bradycardia. Hydralazine PO started for HTN. Eliquis on hold. Heparin drip started.     Review of Systems   Constitutional: Positive for fatigue. Negative for chills, diaphoresis and fever.   HENT: Negative for congestion, ear discharge, rhinorrhea, sinus pressure, sore throat and trouble swallowing.    Eyes: Negative for discharge and visual disturbance.   Respiratory: Negative for apnea, cough, choking, chest tightness, shortness of breath, wheezing and stridor.    Cardiovascular:  Negative for chest pain, palpitations and leg swelling.   Gastrointestinal: Negative for abdominal distention, abdominal pain, diarrhea, nausea and vomiting.   Endocrine: Negative for cold intolerance and heat intolerance.   Genitourinary: Negative for dysuria, frequency and hematuria.   Musculoskeletal: Negative for arthralgias, back pain, myalgias and neck pain.   Skin: Negative for pallor and rash.   Neurological: Positive for weakness. Negative for dizziness, seizures, syncope and headaches.   Psychiatric/Behavioral: Negative for agitation, confusion and sleep disturbance.     Objective:     Vital Signs (Most Recent):  Temp: 96.9 °F (36.1 °C) (04/10/19 1056)  Pulse: (!) 56 (04/10/19 1056)  Resp: 18 (04/10/19 1056)  BP: (!) 202/79 (04/10/19 1056)  SpO2: (!) 93 % (04/10/19 1056) Vital Signs (24h Range):  Temp:  [96.4 °F (35.8 °C)-98.4 °F (36.9 °C)] 96.9 °F (36.1 °C)  Pulse:  [40-56] 56  Resp:  [16-20] 18  SpO2:  [93 %-99 %] 93 %  BP: (160-223)/(66-84) 202/79     Weight: 93 kg (205 lb 0.4 oz)  Body mass index is 31.17 kg/m².    Intake/Output Summary (Last 24 hours) at 4/10/2019 1211  Last data filed at 4/10/2019 0600  Gross per 24 hour   Intake --   Output 1050 ml   Net -1050 ml      Physical Exam   Constitutional: No distress.   Eyes: Right eye exhibits no discharge. Left eye exhibits no discharge.   Cardiovascular: An irregular rhythm present. Bradycardia present. Exam reveals no gallop and no friction rub.   No murmur heard.  Pulmonary/Chest: No stridor. No respiratory distress. He has no wheezes. He has no rales.   Abdominal: He exhibits no distension and no mass. There is no tenderness. There is no rebound and no guarding. No hernia.   Musculoskeletal: He exhibits no edema or deformity.   Neurological: He is alert.   Skin: Skin is warm and dry. Capillary refill takes less than 2 seconds. He is not diaphoretic.   Nursing note and vitals reviewed.      Significant Labs:   CBC:   Recent Labs   Lab 04/09/19  1613  04/10/19  0954   WBC 7.11 7.57   HGB 12.7* 14.2   HCT 38.6* 42.8   * 146*     CMP:   Recent Labs   Lab 04/09/19  1613 04/10/19  0954    140   K 4.4 4.5    105   CO2 30* 28    99   BUN 38* 28*   CREATININE 1.6* 1.3   CALCIUM 9.8 10.1   PROT 6.5  --    ALBUMIN 3.8  --    BILITOT 0.7  --    ALKPHOS 54*  --    AST 21  --    ALT 19  --    ANIONGAP 9 7*   EGFRNONAA 39* 50*     Cardiac Markers:   Recent Labs   Lab 04/09/19  1613   *       Significant Imaging:     Imaging Results          X-Ray Chest AP Portable (Final result)  Result time 04/09/19 16:52:11    Final result by Riki Leon MD (04/09/19 16:52:11)                 Impression:      No acute abnormality.      Electronically signed by: Riki Leon  Date:    04/09/2019  Time:    16:52             Narrative:    EXAMINATION:  XR CHEST AP PORTABLE    CLINICAL HISTORY:  bradycardia;.    TECHNIQUE:  Single frontal portable view of the chest was performed.    COMPARISON:  11/05/2018    FINDINGS:  Support devices: None    The lungs are clear, with normal appearance of pulmonary vasculature and no  pneumothorax.  Suggestion of tiny right pleural effusion unchanged.    Unchanged cardiomegaly.    Bones are intact.                              Assessment/Plan:      * Symptomatic bradycardia    Cardiology following   Hold amlodipine 36-48 hours   Possible PPM placement   Continue to monitor           Essential hypertension  Hold amlodipine   Start Hydralazine 25 mg PO TID   Hydralazine 10 mg as needed SBP > 170      PAF (paroxysmal atrial fibrillation)  Hold Eliquis for now   Start Heparin drip until dicision made for PPM placement       Elevated brain natriuretic peptide (BNP) level    No current signs of ADHF   Monitor       CKD (chronic kidney disease) stage 3, GFR 30-59 ml/min  GFR around baseline   Monitor renal function daily       Depression  Continue home Trazodone and Zoloft         VTE Risk Mitigation (From admission,  onward)        Ordered     heparin 25,000 units in dextrose 5% (100 units/ml) IV bolus from bag INITIAL BOLUS  Once      04/10/19 1138     heparin 25,000 units in dextrose 5% 250 mL (100 units/mL) infusion LOW INTENSITY nomogram - [facility]  Continuous      04/10/19 1138     heparin 25,000 units in dextrose 5% (100 units/ml) IV bolus from bag - ADDITIONAL PRN BOLUS - 60 units/kg  As needed (PRN)      04/10/19 1138     heparin 25,000 units in dextrose 5% (100 units/ml) IV bolus from bag - ADDITIONAL PRN BOLUS - 30 units/kg  As needed (PRN)      04/10/19 1138     IP VTE HIGH RISK PATIENT  Once      04/09/19 1726     Place sequential compression device  Until discontinued      04/09/19 1726              Saad Yañez NP  Department of Hospital Medicine   [facility]

## 2019-04-11 LAB
ANION GAP SERPL CALC-SCNC: 7 MMOL/L (ref 8–16)
APTT BLDCRRT: 48.6 SEC (ref 21–32)
BASOPHILS # BLD AUTO: 0.03 K/UL (ref 0–0.2)
BASOPHILS NFR BLD: 0.4 % (ref 0–1.9)
BUN SERPL-MCNC: 23 MG/DL (ref 8–23)
CALCIUM SERPL-MCNC: 9.7 MG/DL (ref 8.7–10.5)
CHLORIDE SERPL-SCNC: 107 MMOL/L (ref 95–110)
CO2 SERPL-SCNC: 25 MMOL/L (ref 23–29)
CREAT SERPL-MCNC: 1.2 MG/DL (ref 0.5–1.4)
DIFFERENTIAL METHOD: ABNORMAL
EOSINOPHIL # BLD AUTO: 0.1 K/UL (ref 0–0.5)
EOSINOPHIL NFR BLD: 1.2 % (ref 0–8)
ERYTHROCYTE [DISTWIDTH] IN BLOOD BY AUTOMATED COUNT: 15.3 % (ref 11.5–14.5)
EST. GFR  (AFRICAN AMERICAN): >60 ML/MIN/1.73 M^2
EST. GFR  (NON AFRICAN AMERICAN): 55 ML/MIN/1.73 M^2
GLUCOSE SERPL-MCNC: 98 MG/DL (ref 70–110)
HCT VFR BLD AUTO: 41.5 % (ref 40–54)
HGB BLD-MCNC: 13.9 G/DL (ref 14–18)
LYMPHOCYTES # BLD AUTO: 1.7 K/UL (ref 1–4.8)
LYMPHOCYTES NFR BLD: 22.7 % (ref 18–48)
MCH RBC QN AUTO: 28.8 PG (ref 27–31)
MCHC RBC AUTO-ENTMCNC: 33.5 G/DL (ref 32–36)
MCV RBC AUTO: 86 FL (ref 82–98)
MONOCYTES # BLD AUTO: 0.8 K/UL (ref 0.3–1)
MONOCYTES NFR BLD: 10.3 % (ref 4–15)
NEUTROPHILS # BLD AUTO: 4.8 K/UL (ref 1.8–7.7)
NEUTROPHILS NFR BLD: 65.4 % (ref 38–73)
PLATELET # BLD AUTO: 142 K/UL (ref 150–350)
PMV BLD AUTO: 10.4 FL (ref 9.2–12.9)
POTASSIUM SERPL-SCNC: 4 MMOL/L (ref 3.5–5.1)
RBC # BLD AUTO: 4.82 M/UL (ref 4.6–6.2)
SODIUM SERPL-SCNC: 139 MMOL/L (ref 136–145)
WBC # BLD AUTO: 7.3 K/UL (ref 3.9–12.7)

## 2019-04-11 PROCEDURE — 25000003 PHARM REV CODE 250: Performed by: NURSE PRACTITIONER

## 2019-04-11 PROCEDURE — 80048 BASIC METABOLIC PNL TOTAL CA: CPT

## 2019-04-11 PROCEDURE — 85730 THROMBOPLASTIN TIME PARTIAL: CPT

## 2019-04-11 PROCEDURE — 21400001 HC TELEMETRY ROOM

## 2019-04-11 PROCEDURE — 63600175 PHARM REV CODE 636 W HCPCS: Performed by: NURSE PRACTITIONER

## 2019-04-11 PROCEDURE — 25000003 PHARM REV CODE 250: Performed by: INTERNAL MEDICINE

## 2019-04-11 PROCEDURE — 85025 COMPLETE CBC W/AUTO DIFF WBC: CPT

## 2019-04-11 PROCEDURE — 99232 PR SUBSEQUENT HOSPITAL CARE,LEVL II: ICD-10-PCS | Mod: ,,, | Performed by: INTERNAL MEDICINE

## 2019-04-11 PROCEDURE — 99232 SBSQ HOSP IP/OBS MODERATE 35: CPT | Mod: ,,, | Performed by: INTERNAL MEDICINE

## 2019-04-11 RX ORDER — ACETAMINOPHEN 325 MG/1
650 TABLET ORAL EVERY 8 HOURS PRN
Status: DISCONTINUED | OUTPATIENT
Start: 2019-04-11 | End: 2019-04-13 | Stop reason: HOSPADM

## 2019-04-11 RX ORDER — HYDRALAZINE HYDROCHLORIDE 50 MG/1
50 TABLET, FILM COATED ORAL EVERY 8 HOURS
Status: DISCONTINUED | OUTPATIENT
Start: 2019-04-11 | End: 2019-04-12

## 2019-04-11 RX ORDER — CLONIDINE HYDROCHLORIDE 0.1 MG/1
0.1 TABLET ORAL ONCE
Status: COMPLETED | OUTPATIENT
Start: 2019-04-11 | End: 2019-04-11

## 2019-04-11 RX ADMIN — HYDRALAZINE HYDROCHLORIDE 10 MG: 20 INJECTION INTRAMUSCULAR; INTRAVENOUS at 05:04

## 2019-04-11 RX ADMIN — SERTRALINE HYDROCHLORIDE 100 MG: 50 TABLET ORAL at 08:04

## 2019-04-11 RX ADMIN — HYDRALAZINE HYDROCHLORIDE 50 MG: 50 TABLET ORAL at 01:04

## 2019-04-11 RX ADMIN — HYDRALAZINE HYDROCHLORIDE 50 MG: 50 TABLET ORAL at 08:04

## 2019-04-11 RX ADMIN — HEPARIN SODIUM AND DEXTROSE 12 UNITS/KG/HR: 10000; 5 INJECTION INTRAVENOUS at 09:04

## 2019-04-11 RX ADMIN — HYDRALAZINE HYDROCHLORIDE 10 MG: 20 INJECTION INTRAMUSCULAR; INTRAVENOUS at 08:04

## 2019-04-11 RX ADMIN — TRAZODONE HYDROCHLORIDE 150 MG: 100 TABLET ORAL at 09:04

## 2019-04-11 RX ADMIN — CLONIDINE HYDROCHLORIDE 0.1 MG: 0.1 TABLET ORAL at 06:04

## 2019-04-11 RX ADMIN — HYDRALAZINE HYDROCHLORIDE 25 MG: 25 TABLET, FILM COATED ORAL at 05:04

## 2019-04-11 RX ADMIN — ACETAMINOPHEN 650 MG: 325 TABLET ORAL at 06:04

## 2019-04-11 RX ADMIN — HYDRALAZINE HYDROCHLORIDE 50 MG: 50 TABLET ORAL at 09:04

## 2019-04-11 NOTE — PROGRESS NOTES
[facility] Hospital Medicine  Progress Note    Patient Name: Santos Morales  MRN: [id number]  Patient Class: IP- Inpatient   Admission Date: 4/9/2019  Length of Stay: 1 days  Attending Physician: Joe Strauss, *  Primary Care Provider: Diane Bowen MD      Subjective:     Principal Problem:Symptomatic bradycardia    HPI:  Mr Morales is a 84 year old male with PMHx of HTN and A Fib who presented to [facility] Emergency Room after being seen in cardiology clinic with symptomatic bradycardia. He has been experiencing increase generalize weakness and fatigue. Pt wife is at bedside and assistance in answering question. Emergency Room spoke with Cardiology, Dr Garnett recommend admission and hold amlodipine. He currently denies chest pain, shortness of breath, syncope, dizziness, nausea, vomiting, fever or chills. He may need pacemaker placement. Patient is a full code, however does not want to be intubated long term. Surrogate decision is his wife Rosa Sanchez.     Hospital Course:  Patient is a 84 year old male who was admitted with symptomatic bradycardia from cardiac clinic on yesterday. He has history of Anxiety, A Fib, HTN and SAMUEL. Amlodipine has been discontinued on yesterday. Cardiology consult, recommend holding for 48 hours, stop Eliquis in anticipation for PPM placement. Heparin drip started for CVA protections for A Fib.      As of 4/11/19 patient's heart rate is improving while amlodipine is on hold. No plans for PPM today. Blood pressure elevated. Oral hydralazine ordered.     Interval History: Salvadorean speaking-Dr. Day assisted with interpretation. HR now 55-60's. No plans for PPM today. Was confused overnight, but oriented presently.     Review of Systems   Constitutional: Positive for fatigue. Negative for chills, diaphoresis and fever.   HENT: Negative for congestion, ear discharge, rhinorrhea, sinus pressure, sore throat and trouble swallowing.    Eyes: Negative  for discharge and visual disturbance.   Respiratory: Negative for apnea, cough, choking, chest tightness, shortness of breath, wheezing and stridor.    Cardiovascular: Negative for chest pain, palpitations and leg swelling.   Gastrointestinal: Negative for abdominal distention, abdominal pain, diarrhea, nausea and vomiting.   Endocrine: Negative for cold intolerance and heat intolerance.   Genitourinary: Negative for dysuria, frequency and hematuria.   Musculoskeletal: Negative for arthralgias, back pain, myalgias and neck pain.   Skin: Negative for pallor and rash.   Neurological: Positive for weakness. Negative for dizziness, seizures, syncope and headaches.   Psychiatric/Behavioral: Negative for agitation, confusion and sleep disturbance.      Objective:     Vital Signs (Most Recent):  Temp: 98 °F (36.7 °C) (04/11/19 1111)  Pulse: (!) 55 (04/11/19 1127)  Resp: 17 (04/11/19 1111)  BP: (!) 180/74 (04/11/19 1111)  SpO2: 96 % (04/11/19 1111) Vital Signs (24h Range):  Temp:  [96.8 °F (36 °C)-98.5 °F (36.9 °C)] 98 °F (36.7 °C)  Pulse:  [50-64] 55  Resp:  [16-18] 17  SpO2:  [93 %-96 %] 96 %  BP: (134-218)/(59-89) 180/74     Weight: 93 kg (205 lb 0.4 oz)  Body mass index is 31.17 kg/m².    Intake/Output Summary (Last 24 hours) at 4/11/2019 1200  Last data filed at 4/11/2019 1141  Gross per 24 hour   Intake 924.14 ml   Output 1100 ml   Net -175.86 ml      Physical Exam   Constitutional: No distress.   Eyes: Right eye exhibits no discharge. Left eye exhibits no discharge.   Cardiovascular: An irregular rhythm present. Bradycardia present. Exam reveals no gallop and no friction rub.   No murmur heard.  Pulmonary/Chest: No stridor. No respiratory distress. He has no wheezes. He has no rales.   Abdominal: He exhibits no distension and no mass. There is no tenderness. There is no rebound and no guarding. No hernia.   Musculoskeletal: He exhibits no edema or deformity.   Neurological: He is alert.   Skin: Skin is warm and dry.  Capillary refill takes less than 2 seconds. He is not diaphoretic.   Nursing note and vitals reviewed.    Significant Labs:   CBC:   Recent Labs   Lab 04/09/19  1613 04/10/19  0954 04/11/19  0516   WBC 7.11 7.57 7.30   HGB 12.7* 14.2 13.9*   HCT 38.6* 42.8 41.5   * 146* 142*     CMP:   Recent Labs   Lab 04/09/19  1613 04/10/19  0954 04/11/19  0516    140 139   K 4.4 4.5 4.0    105 107   CO2 30* 28 25    99 98   BUN 38* 28* 23   CREATININE 1.6* 1.3 1.2   CALCIUM 9.8 10.1 9.7   PROT 6.5  --   --    ALBUMIN 3.8  --   --    BILITOT 0.7  --   --    ALKPHOS 54*  --   --    AST 21  --   --    ALT 19  --   --    ANIONGAP 9 7* 7*   EGFRNONAA 39* 50* 55*     Significant Imaging: I have reviewed all pertinent imaging results/findings within the past 24 hours.    Assessment/Plan:      * Symptomatic bradycardia    Cardiology following   Hold amlodipine 36-48 hours   Possible PPM placement   Continue to monitor     4/11/19  HR 55-60's while off amlodipine  No PPM today          Elevated brain natriuretic peptide (BNP) level    No current signs of ADHF   Monitor       PAF (paroxysmal atrial fibrillation)  Hold Eliquis for now   Start Heparin drip until dicision made for PPM placement       CKD (chronic kidney disease) stage 3, GFR 30-59 ml/min  GFR around baseline   Monitor renal function daily     4/11/19  Creatinine wnl    Essential hypertension  Hold amlodipine   Start Hydralazine 25 mg PO TID   Hydralazine 10 mg as needed SBP > 170    4/11/19  Blood pressure uncontrolled  Hydralazine added      Depression  Continue home Trazodone and Zoloft         VTE Risk Mitigation (From admission, onward)        Ordered     heparin 25,000 units in dextrose 5% 250 mL (100 units/mL) infusion LOW INTENSITY nomogram - [facility]  Continuous      04/10/19 1138     heparin 25,000 units in dextrose 5% (100 units/ml) IV bolus from bag - ADDITIONAL PRN BOLUS - 60 units/kg  As needed (PRN)      04/10/19 1138     heparin  25,000 units in dextrose 5% (100 units/ml) IV bolus from bag - ADDITIONAL PRN BOLUS - 30 units/kg  As needed (PRN)      04/10/19 1138     IP VTE HIGH RISK PATIENT  Once      04/09/19 1726     Place sequential compression device  Until discontinued      04/09/19 1726        Haleigh Valentine NP  Department of Hospital Medicine   [facility]

## 2019-04-11 NOTE — ASSESSMENT & PLAN NOTE
EKG shows junctional bradycardia 40's.   Advised to hold amlodipine for 36-48 hours before determining if PPM indicated.   Will monitor for now     4/11/19  -HR improving with holding amlodipine   -will continue to hold for now and make decision tomorrow whether PPM indicated.   No PPM indicated at this time

## 2019-04-11 NOTE — ASSESSMENT & PLAN NOTE
Cardiology following   Hold amlodipine 36-48 hours   Possible PPM placement   Continue to monitor     4/11/19  HR 55-60's while off amlodipine  No PPM today

## 2019-04-11 NOTE — PROGRESS NOTES
[facility]   Cardiology  Progress Note    Patient Name: Santos Morales  MRN: [id number]  Admission Date: 4/9/2019  Hospital Length of Stay: 1 days  Code Status: Full Code   Attending Physician: Joe Strauss, *   Primary Care Physician: Diane Bowen MD  Expected Discharge Date:   Principal Problem:Symptomatic bradycardia    Subjective:   Brief HPI:    Mr. Morales is a 85 y/o male with PMH of A-fib, HTN, SAMUEL, Morbitz type I AV block, followed by Dr. Hardy. Patient presented to the ED with complaints of fatigue and occasional dizziness. Has occasional chest pain, but improved with eating and belching. EKG showed junctional bradycardia HR 41. Troponin 0.020, 0.028 Takes amlodipine at home.     Hospital Course:   4/11/19- Patient had a good night.. Troponin flat. Complains of chest wall pain, worse with palpation and movement. Denies any SOB or ONEIL. HR improved this morning to 50-60's with holding amlodipine. BP up this morning. Heparin gtt started on yesterday for CVA prophylaxis given his history of PAF. Has no abnormal bleeding.         Review of Systems   Constitution: Negative for diaphoresis, malaise/fatigue, weight gain and weight loss.   HENT: Negative for congestion and nosebleeds.    Cardiovascular: Negative for chest pain, claudication, cyanosis, dyspnea on exertion, irregular heartbeat, leg swelling, near-syncope, orthopnea, palpitations, paroxysmal nocturnal dyspnea and syncope.   Respiratory: Negative for cough, hemoptysis, shortness of breath, sleep disturbances due to breathing, snoring, sputum production and wheezing.    Hematologic/Lymphatic: Negative for bleeding problem. Does not bruise/bleed easily.   Skin: Negative for rash.   Musculoskeletal: Negative for arthritis, back pain, falls, joint pain, muscle cramps and muscle weakness.   Gastrointestinal: Negative for abdominal pain, constipation, diarrhea, heartburn, hematemesis, hematochezia, melena and nausea.    Genitourinary: Negative for dysuria, hematuria and nocturia.   Neurological: Negative for excessive daytime sleepiness, dizziness, headaches, light-headedness, loss of balance, numbness, vertigo and weakness.     Objective:     Vital Signs (Most Recent):  Temp: 98.4 °F (36.9 °C) (04/11/19 0719)  Pulse: (!) 53 (04/11/19 0937)  Resp: 16 (04/11/19 0719)  BP: (!) 134/59 (04/11/19 0858)  SpO2: 95 % (04/11/19 0719) Vital Signs (24h Range):  Temp:  [96.8 °F (36 °C)-98.5 °F (36.9 °C)] 98.4 °F (36.9 °C)  Pulse:  [50-64] 53  Resp:  [16-18] 16  SpO2:  [93 %-95 %] 95 %  BP: (134-218)/(59-89) 134/59     Weight: 93 kg (205 lb 0.4 oz)  Body mass index is 31.17 kg/m².     SpO2: 95 %  O2 Device (Oxygen Therapy): room air      Intake/Output Summary (Last 24 hours) at 4/11/2019 1107  Last data filed at 4/11/2019 0916  Gross per 24 hour   Intake 901.42 ml   Output 1100 ml   Net -198.58 ml       Lines/Drains/Airways     Peripheral Intravenous Line                 Peripheral IV - Single Lumen 04/09/19 1612 Right Antecubital 1 day                Physical Exam   Constitutional: He is oriented to person, place, and time. He appears well-developed and well-nourished.   Neck: Neck supple. No JVD present.   Cardiovascular: Normal rate, regular rhythm, normal heart sounds and normal pulses. Exam reveals no friction rub.   No murmur heard.  Pulmonary/Chest: Effort normal and breath sounds normal. No respiratory distress. He has no wheezes. He has no rales.   Left chest wall tender to palpation    Abdominal: Soft. Bowel sounds are normal. He exhibits no distension.   Musculoskeletal: He exhibits no edema or tenderness.   Neurological: He is alert and oriented to person, place, and time.   Skin: Skin is warm and dry. No rash noted.   Psychiatric: He has a normal mood and affect. His behavior is normal.   Nursing note and vitals reviewed.      Significant Labs:   All pertinent lab results from the last 24 hours have been reviewed. and   Recent  Lab Results       04/11/19  0516   04/10/19  1957   04/10/19  1153        Anion Gap 7         aPTT 48.6  Comment:  aPTT therapeutic range = 39-69 seconds 54.8  Comment:  aPTT therapeutic range = 39-69 seconds  Results confirmed, test repeated   31.1  Comment:  aPTT therapeutic range = 39-69 seconds     Baso # 0.03         Basophil% 0.4         BUN, Bld 23         Calcium 9.7         Chloride 107         CO2 25         Creatinine 1.2         Differential Method Automated         eGFR if  >60         eGFR if non  55  Comment:  Calculation used to obtain the estimated glomerular filtration  rate (eGFR) is the CKD-EPI equation.            Eos # 0.1         Eosinophil% 1.2         Glucose 98         Gran # (ANC) 4.8         Gran% 65.4         Hematocrit 41.5         Hemoglobin 13.9         Coumadin Monitoring INR     1.1  Comment:  Coumadin Therapy:  2.0 - 3.0 for INR for all indicators except mechanical heart valves  and antiphospholipid syndromes which should use 2.5 - 3.5.       Lymph # 1.7         Lymph% 22.7         MCH 28.8         MCHC 33.5         MCV 86         Mono # 0.8         Mono% 10.3         MPV 10.4         Platelets 142         Potassium 4.0         Protime     11.4     RBC 4.82         RDW 15.3         Sodium 139         WBC 7.30               Significant Imaging: Echocardiogram:   2D echo with color flow doppler:   Results for orders placed or performed in visit on 11/15/18   2D Echo w/ Color Flow Doppler   Result Value Ref Range    QEF 60 55 - 65    Mitral Valve Regurgitation TRIVIAL     Aortic Valve Regurgitation MILD TO MODERATE (A)     Est. PA Systolic Pressure 28.79     and X-Ray: CXR: X-Ray Chest 1 View (CXR): No results found for this visit on 04/09/19. and X-Ray Chest PA and Lateral (CXR): No results found for this visit on 04/09/19.    Assessment and Plan:       * Symptomatic bradycardia  EKG shows junctional bradycardia 40's.   Advised to hold amlodipine for  36-48 hours before determining if PPM indicated.   Will monitor for now     4/11/19  -HR improving with holding amlodipine   -will continue to hold for now and make decision tomorrow whether PPM indicated.   No PPM indicated at this time     PAF (paroxysmal atrial fibrillation)  BB, CCB on hold to avoid further bradycardia.   Takes Eliqius at home for CVA prophylaxis  Recommend Heparin gtt for now until decision made regarding device implant     Essential hypertension  Amlodipine on hold   Start Hydralazine at 50mg TID        VTE Risk Mitigation (From admission, onward)        Ordered     heparin 25,000 units in dextrose 5% 250 mL (100 units/mL) infusion LOW INTENSITY nomogram - [facility]  Continuous      04/10/19 1138     heparin 25,000 units in dextrose 5% (100 units/ml) IV bolus from bag - ADDITIONAL PRN BOLUS - 60 units/kg  As needed (PRN)      04/10/19 1138     heparin 25,000 units in dextrose 5% (100 units/ml) IV bolus from bag - ADDITIONAL PRN BOLUS - 30 units/kg  As needed (PRN)      04/10/19 1138     IP VTE HIGH RISK PATIENT  Once      04/09/19 1726     Place sequential compression device  Until discontinued      04/09/19 1726      Chart reviewed. Patient examined by Dr. Garnett and agrees with plan that has been outlined.       ALEC Hitchcock  Cardiology  [facility]

## 2019-04-11 NOTE — SUBJECTIVE & OBJECTIVE
Interval History: Hebrew speaking-Dr. Day assisted with interpretation. HR now 55-60's. No plans for PPM today. Was confused overnight, but oriented presently.     Review of Systems   Constitutional: Positive for fatigue. Negative for chills, diaphoresis and fever.   HENT: Negative for congestion, ear discharge, rhinorrhea, sinus pressure, sore throat and trouble swallowing.    Eyes: Negative for discharge and visual disturbance.   Respiratory: Negative for apnea, cough, choking, chest tightness, shortness of breath, wheezing and stridor.    Cardiovascular: Negative for chest pain, palpitations and leg swelling.   Gastrointestinal: Negative for abdominal distention, abdominal pain, diarrhea, nausea and vomiting.   Endocrine: Negative for cold intolerance and heat intolerance.   Genitourinary: Negative for dysuria, frequency and hematuria.   Musculoskeletal: Negative for arthralgias, back pain, myalgias and neck pain.   Skin: Negative for pallor and rash.   Neurological: Positive for weakness. Negative for dizziness, seizures, syncope and headaches.   Psychiatric/Behavioral: Negative for agitation, confusion and sleep disturbance.      Objective:     Vital Signs (Most Recent):  Temp: 98 °F (36.7 °C) (04/11/19 1111)  Pulse: (!) 55 (04/11/19 1127)  Resp: 17 (04/11/19 1111)  BP: (!) 180/74 (04/11/19 1111)  SpO2: 96 % (04/11/19 1111) Vital Signs (24h Range):  Temp:  [96.8 °F (36 °C)-98.5 °F (36.9 °C)] 98 °F (36.7 °C)  Pulse:  [50-64] 55  Resp:  [16-18] 17  SpO2:  [93 %-96 %] 96 %  BP: (134-218)/(59-89) 180/74     Weight: 93 kg (205 lb 0.4 oz)  Body mass index is 31.17 kg/m².    Intake/Output Summary (Last 24 hours) at 4/11/2019 1200  Last data filed at 4/11/2019 1141  Gross per 24 hour   Intake 924.14 ml   Output 1100 ml   Net -175.86 ml      Physical Exam   Constitutional: No distress.   Eyes: Right eye exhibits no discharge. Left eye exhibits no discharge.   Cardiovascular: An irregular rhythm present. Bradycardia  present. Exam reveals no gallop and no friction rub.   No murmur heard.  Pulmonary/Chest: No stridor. No respiratory distress. He has no wheezes. He has no rales.   Abdominal: He exhibits no distension and no mass. There is no tenderness. There is no rebound and no guarding. No hernia.   Musculoskeletal: He exhibits no edema or deformity.   Neurological: He is alert.   Skin: Skin is warm and dry. Capillary refill takes less than 2 seconds. He is not diaphoretic.   Nursing note and vitals reviewed.    Significant Labs:   CBC:   Recent Labs   Lab 04/09/19  1613 04/10/19  0954 04/11/19  0516   WBC 7.11 7.57 7.30   HGB 12.7* 14.2 13.9*   HCT 38.6* 42.8 41.5   * 146* 142*     CMP:   Recent Labs   Lab 04/09/19  1613 04/10/19  0954 04/11/19  0516    140 139   K 4.4 4.5 4.0    105 107   CO2 30* 28 25    99 98   BUN 38* 28* 23   CREATININE 1.6* 1.3 1.2   CALCIUM 9.8 10.1 9.7   PROT 6.5  --   --    ALBUMIN 3.8  --   --    BILITOT 0.7  --   --    ALKPHOS 54*  --   --    AST 21  --   --    ALT 19  --   --    ANIONGAP 9 7* 7*   EGFRNONAA 39* 50* 55*     Significant Imaging: I have reviewed all pertinent imaging results/findings within the past 24 hours.

## 2019-04-11 NOTE — SUBJECTIVE & OBJECTIVE
Review of Systems   Constitution: Negative for diaphoresis, malaise/fatigue, weight gain and weight loss.   HENT: Negative for congestion and nosebleeds.    Cardiovascular: Negative for chest pain, claudication, cyanosis, dyspnea on exertion, irregular heartbeat, leg swelling, near-syncope, orthopnea, palpitations, paroxysmal nocturnal dyspnea and syncope.   Respiratory: Negative for cough, hemoptysis, shortness of breath, sleep disturbances due to breathing, snoring, sputum production and wheezing.    Hematologic/Lymphatic: Negative for bleeding problem. Does not bruise/bleed easily.   Skin: Negative for rash.   Musculoskeletal: Negative for arthritis, back pain, falls, joint pain, muscle cramps and muscle weakness.   Gastrointestinal: Negative for abdominal pain, constipation, diarrhea, heartburn, hematemesis, hematochezia, melena and nausea.   Genitourinary: Negative for dysuria, hematuria and nocturia.   Neurological: Negative for excessive daytime sleepiness, dizziness, headaches, light-headedness, loss of balance, numbness, vertigo and weakness.     Objective:     Vital Signs (Most Recent):  Temp: 98.4 °F (36.9 °C) (04/11/19 0719)  Pulse: (!) 53 (04/11/19 0937)  Resp: 16 (04/11/19 0719)  BP: (!) 134/59 (04/11/19 0858)  SpO2: 95 % (04/11/19 0719) Vital Signs (24h Range):  Temp:  [96.8 °F (36 °C)-98.5 °F (36.9 °C)] 98.4 °F (36.9 °C)  Pulse:  [50-64] 53  Resp:  [16-18] 16  SpO2:  [93 %-95 %] 95 %  BP: (134-218)/(59-89) 134/59     Weight: 93 kg (205 lb 0.4 oz)  Body mass index is 31.17 kg/m².     SpO2: 95 %  O2 Device (Oxygen Therapy): room air      Intake/Output Summary (Last 24 hours) at 4/11/2019 1107  Last data filed at 4/11/2019 0916  Gross per 24 hour   Intake 901.42 ml   Output 1100 ml   Net -198.58 ml       Lines/Drains/Airways     Peripheral Intravenous Line                 Peripheral IV - Single Lumen 04/09/19 1612 Right Antecubital 1 day                Physical Exam   Constitutional: He is oriented  to person, place, and time. He appears well-developed and well-nourished.   Neck: Neck supple. No JVD present.   Cardiovascular: Normal rate, regular rhythm, normal heart sounds and normal pulses. Exam reveals no friction rub.   No murmur heard.  Pulmonary/Chest: Effort normal and breath sounds normal. No respiratory distress. He has no wheezes. He has no rales.   Left chest wall tender to palpation    Abdominal: Soft. Bowel sounds are normal. He exhibits no distension.   Musculoskeletal: He exhibits no edema or tenderness.   Neurological: He is alert and oriented to person, place, and time.   Skin: Skin is warm and dry. No rash noted.   Psychiatric: He has a normal mood and affect. His behavior is normal.   Nursing note and vitals reviewed.      Significant Labs:   All pertinent lab results from the last 24 hours have been reviewed. and   Recent Lab Results       04/11/19  0516   04/10/19  1957   04/10/19  1153        Anion Gap 7         aPTT 48.6  Comment:  aPTT therapeutic range = 39-69 seconds 54.8  Comment:  aPTT therapeutic range = 39-69 seconds  Results confirmed, test repeated   31.1  Comment:  aPTT therapeutic range = 39-69 seconds     Baso # 0.03         Basophil% 0.4         BUN, Bld 23         Calcium 9.7         Chloride 107         CO2 25         Creatinine 1.2         Differential Method Automated         eGFR if  >60         eGFR if non  55  Comment:  Calculation used to obtain the estimated glomerular filtration  rate (eGFR) is the CKD-EPI equation.            Eos # 0.1         Eosinophil% 1.2         Glucose 98         Gran # (ANC) 4.8         Gran% 65.4         Hematocrit 41.5         Hemoglobin 13.9         Coumadin Monitoring INR     1.1  Comment:  Coumadin Therapy:  2.0 - 3.0 for INR for all indicators except mechanical heart valves  and antiphospholipid syndromes which should use 2.5 - 3.5.       Lymph # 1.7         Lymph% 22.7         MCH 28.8         MCHC 33.5          MCV 86         Mono # 0.8         Mono% 10.3         MPV 10.4         Platelets 142         Potassium 4.0         Protime     11.4     RBC 4.82         RDW 15.3         Sodium 139         WBC 7.30               Significant Imaging: Echocardiogram:   2D echo with color flow doppler:   Results for orders placed or performed in visit on 11/15/18   2D Echo w/ Color Flow Doppler   Result Value Ref Range    QEF 60 55 - 65    Mitral Valve Regurgitation TRIVIAL     Aortic Valve Regurgitation MILD TO MODERATE (A)     Est. PA Systolic Pressure 28.79     and X-Ray: CXR: X-Ray Chest 1 View (CXR): No results found for this visit on 04/09/19. and X-Ray Chest PA and Lateral (CXR): No results found for this visit on 04/09/19.

## 2019-04-11 NOTE — ASSESSMENT & PLAN NOTE
Hold amlodipine   Start Hydralazine 25 mg PO TID   Hydralazine 10 mg as needed SBP > 170    4/11/19  Blood pressure uncontrolled  Hydralazine added

## 2019-04-11 NOTE — NURSING
"Patient resisted blood pressure being taken, stating that it hurts. Nurse explained (first through the patient's wife as an  and then through use of Josie) that the blood pressure must be taken in order to evaluate the activity of his heart. Patient verbalized understanding but continued to resist care with wild accusations against his own wife and the medical staff - stating, amongst other things, that his wife and the staff are "crazy" and trying to hurt him. Patient's wife states that she and her family believe the patient has a history "of some dementia."  In order to best facilitate communication and care, patient's care passed on to JOSE Martinez.   "

## 2019-04-11 NOTE — PROGRESS NOTES
Pt refused 0000 and 0400 VS. Pt has been calm and cooperative throughout this RN's shift. Pt free from fall/rauma/injury with wife at bedside. Will continue to monitor.

## 2019-04-11 NOTE — PROGRESS NOTES
Pt has been redirected to this RN's care for the remainder of the shift. Pt is calm and resting, with wife at the bedside.

## 2019-04-11 NOTE — HOSPITAL COURSE
4/11/19- Patient had a good night.. Troponin flat. Complains of chest wall pain, worse with palpation and movement. Denies any SOB or ONEIL. HR improved this morning to 50-60's with holding amlodipine. BP up this morning. Heparin gtt started on yesterday for CVA prophylaxis given his history of PAF. Has no abnormal bleeding.     4/12/19- HR improved to 60's on yesterday, but BP wasn't controlled. Started on Hydralazine yesterday, but given dose of Clonidine overnight. HR in the low 50's this morning. Hydralazine has been increased to 75mg TID. Denies any chest pain, SOB, orthopnea or PND. Has no CNS Complaints to suggest TIA or CVA. Remains on Heparin gtt.

## 2019-04-11 NOTE — PLAN OF CARE
Problem: Adult Inpatient Plan of Care  Goal: Patient-Specific Goal (Individualization)  Outcome: Ongoing (interventions implemented as appropriate)  Plan of care reviewed with patient and family    Comments: Patient A&Ox4. German speaking only. Family at bedside to translate. Room air. Runs Afib on monitor. HR ranging from 50-60s. Heparin gtt continues. Therapeutic ranges. Daily PTT draws. PRN hydralazine given x1 for high blood pressure. No complaints of pain. All questions answered. Will continue to monitor..    Thuy Delgado RN

## 2019-04-12 ENCOUNTER — TELEPHONE (OUTPATIENT)
Dept: CARDIOLOGY | Facility: CLINIC | Age: 84
End: 2019-04-12

## 2019-04-12 LAB
ANION GAP SERPL CALC-SCNC: 8 MMOL/L (ref 8–16)
APTT BLDCRRT: 45 SEC (ref 21–32)
BASOPHILS # BLD AUTO: 0.02 K/UL (ref 0–0.2)
BASOPHILS NFR BLD: 0.3 % (ref 0–1.9)
BUN SERPL-MCNC: 19 MG/DL (ref 8–23)
CALCIUM SERPL-MCNC: 9.7 MG/DL (ref 8.7–10.5)
CHLORIDE SERPL-SCNC: 105 MMOL/L (ref 95–110)
CO2 SERPL-SCNC: 24 MMOL/L (ref 23–29)
CREAT SERPL-MCNC: 1.2 MG/DL (ref 0.5–1.4)
DIFFERENTIAL METHOD: ABNORMAL
EOSINOPHIL # BLD AUTO: 0.1 K/UL (ref 0–0.5)
EOSINOPHIL NFR BLD: 1.4 % (ref 0–8)
ERYTHROCYTE [DISTWIDTH] IN BLOOD BY AUTOMATED COUNT: 15.6 % (ref 11.5–14.5)
EST. GFR  (AFRICAN AMERICAN): >60 ML/MIN/1.73 M^2
EST. GFR  (NON AFRICAN AMERICAN): 55 ML/MIN/1.73 M^2
GLUCOSE SERPL-MCNC: 97 MG/DL (ref 70–110)
HCT VFR BLD AUTO: 43.2 % (ref 40–54)
HGB BLD-MCNC: 14.4 G/DL (ref 14–18)
LYMPHOCYTES # BLD AUTO: 1.9 K/UL (ref 1–4.8)
LYMPHOCYTES NFR BLD: 27.4 % (ref 18–48)
MCH RBC QN AUTO: 28.6 PG (ref 27–31)
MCHC RBC AUTO-ENTMCNC: 33.3 G/DL (ref 32–36)
MCV RBC AUTO: 86 FL (ref 82–98)
MONOCYTES # BLD AUTO: 0.7 K/UL (ref 0.3–1)
MONOCYTES NFR BLD: 10.4 % (ref 4–15)
NEUTROPHILS # BLD AUTO: 4.3 K/UL (ref 1.8–7.7)
NEUTROPHILS NFR BLD: 60.5 % (ref 38–73)
PLATELET # BLD AUTO: 158 K/UL (ref 150–350)
PMV BLD AUTO: 10.6 FL (ref 9.2–12.9)
POTASSIUM SERPL-SCNC: 4.3 MMOL/L (ref 3.5–5.1)
RBC # BLD AUTO: 5.03 M/UL (ref 4.6–6.2)
SODIUM SERPL-SCNC: 137 MMOL/L (ref 136–145)
WBC # BLD AUTO: 7.09 K/UL (ref 3.9–12.7)

## 2019-04-12 PROCEDURE — 85025 COMPLETE CBC W/AUTO DIFF WBC: CPT

## 2019-04-12 PROCEDURE — 21400001 HC TELEMETRY ROOM

## 2019-04-12 PROCEDURE — 85730 THROMBOPLASTIN TIME PARTIAL: CPT

## 2019-04-12 PROCEDURE — 99232 SBSQ HOSP IP/OBS MODERATE 35: CPT | Mod: ,,, | Performed by: INTERNAL MEDICINE

## 2019-04-12 PROCEDURE — 25000003 PHARM REV CODE 250: Performed by: INTERNAL MEDICINE

## 2019-04-12 PROCEDURE — 36415 COLL VENOUS BLD VENIPUNCTURE: CPT

## 2019-04-12 PROCEDURE — 25000003 PHARM REV CODE 250: Performed by: NURSE PRACTITIONER

## 2019-04-12 PROCEDURE — 99232 PR SUBSEQUENT HOSPITAL CARE,LEVL II: ICD-10-PCS | Mod: ,,, | Performed by: INTERNAL MEDICINE

## 2019-04-12 PROCEDURE — 80048 BASIC METABOLIC PNL TOTAL CA: CPT

## 2019-04-12 RX ORDER — HYDRALAZINE HYDROCHLORIDE 50 MG/1
100 TABLET, FILM COATED ORAL EVERY 8 HOURS
Status: DISCONTINUED | OUTPATIENT
Start: 2019-04-12 | End: 2019-04-13 | Stop reason: HOSPADM

## 2019-04-12 RX ADMIN — HYDRALAZINE HYDROCHLORIDE 100 MG: 50 TABLET ORAL at 10:04

## 2019-04-12 RX ADMIN — APIXABAN 5 MG: 2.5 TABLET, FILM COATED ORAL at 10:04

## 2019-04-12 RX ADMIN — HYDRALAZINE HYDROCHLORIDE 100 MG: 50 TABLET ORAL at 01:04

## 2019-04-12 RX ADMIN — SERTRALINE HYDROCHLORIDE 100 MG: 50 TABLET ORAL at 09:04

## 2019-04-12 RX ADMIN — HYDRALAZINE HYDROCHLORIDE 50 MG: 50 TABLET ORAL at 05:04

## 2019-04-12 RX ADMIN — TRAZODONE HYDROCHLORIDE 150 MG: 100 TABLET ORAL at 09:04

## 2019-04-12 RX ADMIN — APIXABAN 5 MG: 2.5 TABLET, FILM COATED ORAL at 09:04

## 2019-04-12 NOTE — PROGRESS NOTES
[facility] Hospital Medicine  Progress Note    Patient Name: Santos Morales  MRN: [id number]  Patient Class: IP- Inpatient   Admission Date: 4/9/2019  Length of Stay: 2 days  Attending Physician: Joe Strauss, *  Primary Care Provider: Diane Bowen MD        Subjective:     Principal Problem:Symptomatic bradycardia    HPI:  Mr Morales is a 84 year old male with PMHx of HTN and A Fib who presented to [facility] Emergency Room after being seen in cardiology clinic with symptomatic bradycardia. He has been experiencing increase generalize weakness and fatigue. Pt wife is at bedside and assistance in answering question. Emergency Room spoke with Cardiology, Dr Garnett recommend admission and hold amlodipine. He currently denies chest pain, shortness of breath, syncope, dizziness, nausea, vomiting, fever or chills. He may need pacemaker placement. Patient is a full code, however does not want to be intubated long term. Surrogate decision is his wife Rosa Sanchez.     Hospital Course:  Patient is a 84 year old male who was admitted with symptomatic bradycardia from cardiac clinic on yesterday. He has history of Anxiety, A Fib, HTN and SAMUEL. Amlodipine has been discontinued on yesterday. Cardiology consult, recommend holding for 48 hours, stop Eliquis in anticipation for PPM placement. Heparin drip started for CVA protections for A Fib.      As of 4/11/19 patient's heart rate is improving while amlodipine is on hold. No plans for PPM today. Blood pressure elevated. Oral hydralazine ordered.   4/12 Pt hr now  Is between 45 to 55 . Cardiology is not planning  For PPM . The blood pressure has been elevated and received a dose of clonidine last night .  The blood pressure medication hydralazine was increased to 100 mg po tid . He has a h/o dementia     Interval History:     Review of Systems   Constitutional: Positive for fatigue. Negative for chills, diaphoresis and fever.   HENT: Negative  for congestion, ear discharge, rhinorrhea, sinus pressure, sore throat and trouble swallowing.    Eyes: Negative for discharge and visual disturbance.   Respiratory: Negative for apnea, cough, choking, chest tightness, shortness of breath, wheezing and stridor.    Cardiovascular: Negative for chest pain, palpitations and leg swelling.   Gastrointestinal: Negative for abdominal distention, abdominal pain, diarrhea, nausea and vomiting.   Endocrine: Negative for cold intolerance and heat intolerance.   Genitourinary: Negative for dysuria, frequency and hematuria.   Musculoskeletal: Negative for arthralgias, back pain, myalgias and neck pain.   Skin: Negative for pallor and rash.   Neurological: Positive for weakness. Negative for dizziness, seizures, syncope and headaches.   Psychiatric/Behavioral: Negative for agitation, confusion and sleep disturbance.     Objective:     Vital Signs (Most Recent):  Temp: 98.7 °F (37.1 °C) (04/12/19 0711)  Pulse: (!) 49 (04/12/19 0711)  Resp: 14 (04/12/19 0711)  BP: (!) 173/77 (04/12/19 0711)  SpO2: (!) 93 % (04/12/19 0711) Vital Signs (24h Range):  Temp:  [97.9 °F (36.6 °C)-98.7 °F (37.1 °C)] 98.7 °F (37.1 °C)  Pulse:  [47-60] 49  Resp:  [14-20] 14  SpO2:  [92 %-94 %] 93 %  BP: (139-207)/(59-88) 173/77     Weight: 93 kg (205 lb 0.4 oz)  Body mass index is 31.17 kg/m².    Intake/Output Summary (Last 24 hours) at 4/12/2019 1342  Last data filed at 4/12/2019 1200  Gross per 24 hour   Intake 289.66 ml   Output 125 ml   Net 164.66 ml      Physical Exam   Constitutional: No distress.   Eyes: Right eye exhibits no discharge. Left eye exhibits no discharge.   Cardiovascular: An irregular rhythm present. Bradycardia present. Exam reveals no gallop and no friction rub.   No murmur heard.  Pulmonary/Chest: No stridor. No respiratory distress. He has no wheezes. He has no rales.   Abdominal: He exhibits no distension and no mass. There is no tenderness. There is no rebound and no guarding. No  hernia.   Musculoskeletal: He exhibits no edema or deformity.   Neurological: He is alert.   Skin: Skin is warm and dry. Capillary refill takes less than 2 seconds. He is not diaphoretic.   Nursing note and vitals reviewed.      Significant Labs: All pertinent labs within the past 24 hours have been reviewed.    Significant Imaging: I have reviewed all pertinent imaging results/findings within the past 24 hours.    Assessment/Plan:      * Symptomatic bradycardia    Cardiology following   Hold amlodipine 36-48 hours   Possible PPM placement   Continue to monitor     4/11/19  HR 55-60's while off amlodipine  No PPM today     4/12   Pt received a dose of clonidine last night   The heart rate is around 45 to 55  Per Cardiology no PPM and pt can be d/c   We will cont monitor  For 24 hrs       Elevated brain natriuretic peptide (BNP) level    No current signs of ADHF   Monitor       PAF (paroxysmal atrial fibrillation)   Resume eliquis  Cardiology is not planing for PPM       CKD (chronic kidney disease) stage 3, GFR 30-59 ml/min  GFR around baseline   Monitor renal function daily     4/11/19  Creatinine wnl    Essential hypertension  Hold amlodipine   Start Hydralazine 25 mg PO TID   Hydralazine 10 mg as needed SBP > 170    4/11/19  Blood pressure uncontrolled  Hydralazine added    Uncontrolled   Hydralazine was increased to 100 mg po tid       Depression  Continue home Trazodone and Zoloft         VTE Risk Mitigation (From admission, onward)        Ordered     apixaban tablet 5 mg  2 times daily      04/12/19 1007     IP VTE HIGH RISK PATIENT  Once      04/09/19 1726     Place sequential compression device  Until discontinued      04/09/19 1726              Joe Strauss MD  Department of Hospital Medicine   [facility]

## 2019-04-12 NOTE — ASSESSMENT & PLAN NOTE
Cardiology following   Hold amlodipine 36-48 hours   Possible PPM placement   Continue to monitor     4/11/19  HR 55-60's while off amlodipine  No PPM today     4/12   Pt received a dose of clonidine last night   The heart rate is around 45 to 55  Per Cardiology no PPM and pt can be d/c   We will cont monitor  For 24 hrs

## 2019-04-12 NOTE — PROGRESS NOTES
[facility]  Cardiology  Progress Note    Patient Name: Santos Morales  MRN: [id number]  Admission Date: 4/9/2019  Hospital Length of Stay: 2 days  Code Status: Full Code   Attending Physician: Joe Strauss, *   Primary Care Physician: Diane Bowen MD  Expected Discharge Date: 4/12/2019  Principal Problem:Symptomatic bradycardia    Subjective:   Brief HPI:    Mr. Morales is a 85 y/o male with PMH of A-fib, HTN, SAMUEL, Morbitz type I AV block, followed by Dr. Hardy. Patient presented to the ED with complaints of fatigue and occasional dizziness. Has occasional chest pain, but improved with eating and belching. EKG showed junctional bradycardia HR 41. Troponin 0.020, 0.028 Takes amlodipine at home.       Hospital Course:   4/11/19- Patient had a good night.. Troponin flat. Complains of chest wall pain, worse with palpation and movement. Denies any SOB or ONEIL. HR improved this morning to 50-60's with holding amlodipine. BP up this morning. Heparin gtt started on yesterday for CVA prophylaxis given his history of PAF. Has no abnormal bleeding.     4/12/19- HR improved to 60's on yesterday, but BP wasn't controlled. Started on Hydralazine yesterday, but given dose of Clonidine overnight. HR in the low 50's this morning. Hydralazine has been increased to 75mg TID. Denies any chest pain, SOB, orthopnea or PND. Has no CNS Complaints to suggest TIA or CVA. Remains on Heparin gtt.         Review of Systems   Constitution: Negative for diaphoresis, malaise/fatigue, weight gain and weight loss.   HENT: Negative for congestion and nosebleeds.    Cardiovascular: Negative for chest pain, claudication, cyanosis, dyspnea on exertion, irregular heartbeat, leg swelling, near-syncope, orthopnea, palpitations, paroxysmal nocturnal dyspnea and syncope.   Respiratory: Negative for cough, hemoptysis, shortness of breath, sleep disturbances due to breathing, snoring, sputum production and wheezing.     Hematologic/Lymphatic: Negative for bleeding problem. Does not bruise/bleed easily.   Skin: Negative for rash.   Musculoskeletal: Negative for arthritis, back pain, falls, joint pain, muscle cramps and muscle weakness.   Gastrointestinal: Negative for abdominal pain, constipation, diarrhea, heartburn, hematemesis, hematochezia, melena and nausea.   Genitourinary: Negative for dysuria, hematuria and nocturia.   Neurological: Negative for excessive daytime sleepiness, dizziness, headaches, light-headedness, loss of balance, numbness, vertigo and weakness.     Objective:     Vital Signs (Most Recent):  Temp: 98.7 °F (37.1 °C) (04/12/19 0711)  Pulse: (!) 48 (04/12/19 1300)  Resp: 14 (04/12/19 0711)  BP: (!) 173/77 (04/12/19 0711)  SpO2: (!) 93 % (04/12/19 0711) Vital Signs (24h Range):  Temp:  [97.9 °F (36.6 °C)-98.7 °F (37.1 °C)] 98.7 °F (37.1 °C)  Pulse:  [47-60] 48  Resp:  [14-20] 14  SpO2:  [92 %-94 %] 93 %  BP: (139-207)/(59-88) 173/77     Weight: 93 kg (205 lb 0.4 oz)  Body mass index is 31.17 kg/m².     SpO2: (!) 93 %  O2 Device (Oxygen Therapy): room air      Intake/Output Summary (Last 24 hours) at 4/12/2019 1430  Last data filed at 4/12/2019 1200  Gross per 24 hour   Intake 289.66 ml   Output 125 ml   Net 164.66 ml       Lines/Drains/Airways     Peripheral Intravenous Line                 Peripheral IV - Single Lumen 04/09/19 1612 Right Antecubital 2 days                Physical Exam   Constitutional: He is oriented to person, place, and time. He appears well-developed and well-nourished.   Neck: Neck supple. No JVD present.   Cardiovascular: Regular rhythm, normal heart sounds and normal pulses. Bradycardia present. Exam reveals no friction rub.   No murmur heard.  Pulmonary/Chest: Effort normal and breath sounds normal. No respiratory distress. He has no wheezes. He has no rales.   Abdominal: Soft. Bowel sounds are normal. He exhibits no distension.   Musculoskeletal: He exhibits no edema or tenderness.    Neurological: He is alert and oriented to person, place, and time.   Skin: Skin is warm and dry. No rash noted.   Psychiatric: He has a normal mood and affect. His behavior is normal.   Nursing note and vitals reviewed.      Significant Labs:   All pertinent lab results from the last 24 hours have been reviewed. and   Recent Lab Results       04/12/19  0522        Anion Gap 8     aPTT 45.0  Comment:  aPTT therapeutic range = 39-69 seconds     Baso # 0.02     Basophil% 0.3     BUN, Bld 19     Calcium 9.7     Chloride 105     CO2 24     Creatinine 1.2     Differential Method Automated     eGFR if  >60     eGFR if non  55  Comment:  Calculation used to obtain the estimated glomerular filtration  rate (eGFR) is the CKD-EPI equation.        Eos # 0.1     Eosinophil% 1.4     Glucose 97     Gran # (ANC) 4.3     Gran% 60.5     Hematocrit 43.2     Hemoglobin 14.4     Lymph # 1.9     Lymph% 27.4     MCH 28.6     MCHC 33.3     MCV 86     Mono # 0.7     Mono% 10.4     MPV 10.6     Platelets 158     Potassium 4.3     RBC 5.03     RDW 15.6     Sodium 137     WBC 7.09           Significant Imaging: Echocardiogram:   2D echo with color flow doppler:   Results for orders placed or performed in visit on 11/15/18   2D Echo w/ Color Flow Doppler   Result Value Ref Range    QEF 60 55 - 65    Mitral Valve Regurgitation TRIVIAL     Aortic Valve Regurgitation MILD TO MODERATE (A)     Est. PA Systolic Pressure 28.79      Assessment and Plan:       * Symptomatic bradycardia  EKG shows junctional bradycardia 40's.   Advised to hold amlodipine for 36-48 hours before determining if PPM indicated.   Will monitor for now     4/11/19  -HR improving with holding amlodipine   -will continue to hold for now and make decision tomorrow whether PPM indicated.   No PPM indicated at this time     4/12/19  -No PPM indicated at this time   -no BB or CCB due to bradycardia  -OK to resume Eliqius.   -Recommend OP holter .  Clinic will arrange holter and follow up       PAF (paroxysmal atrial fibrillation)  BB, CCB on hold to avoid further bradycardia.   Takes Eliqius at home for CVA prophylaxis  Recommend Heparin gtt for now until decision made regarding device implant     4/12/19  -PAF with controlled rate   -no BB or CCB due to bradycardia  -OK to resume Eliqius.   -No PPM indicated at this time   -Recommend OP holter . Clinic will arrange holter and follow up     Essential hypertension  Amlodipine on hold   Start Hydralazine at 50mg TID    4/12/19  -Agree with increasing Hydralazine to 100mg TID for HTN management   -Avoid CCB, BB and Clonidine  -He will need further med optimization as an OP          VTE Risk Mitigation (From admission, onward)        Ordered     apixaban tablet 5 mg  2 times daily      04/12/19 1007     IP VTE HIGH RISK PATIENT  Once      04/09/19 1726     Place sequential compression device  Until discontinued      04/09/19 1726        Chart reviewed. Patient examined by Dr. Garnett and agrees with plan that has been outlined.     ALEC Hitchcock  Cardiology  [facility]

## 2019-04-12 NOTE — SUBJECTIVE & OBJECTIVE
Interval History:     Review of Systems   Constitutional: Positive for fatigue. Negative for chills, diaphoresis and fever.   HENT: Negative for congestion, ear discharge, rhinorrhea, sinus pressure, sore throat and trouble swallowing.    Eyes: Negative for discharge and visual disturbance.   Respiratory: Negative for apnea, cough, choking, chest tightness, shortness of breath, wheezing and stridor.    Cardiovascular: Negative for chest pain, palpitations and leg swelling.   Gastrointestinal: Negative for abdominal distention, abdominal pain, diarrhea, nausea and vomiting.   Endocrine: Negative for cold intolerance and heat intolerance.   Genitourinary: Negative for dysuria, frequency and hematuria.   Musculoskeletal: Negative for arthralgias, back pain, myalgias and neck pain.   Skin: Negative for pallor and rash.   Neurological: Positive for weakness. Negative for dizziness, seizures, syncope and headaches.   Psychiatric/Behavioral: Negative for agitation, confusion and sleep disturbance.     Objective:     Vital Signs (Most Recent):  Temp: 98.7 °F (37.1 °C) (04/12/19 0711)  Pulse: (!) 49 (04/12/19 0711)  Resp: 14 (04/12/19 0711)  BP: (!) 173/77 (04/12/19 0711)  SpO2: (!) 93 % (04/12/19 0711) Vital Signs (24h Range):  Temp:  [97.9 °F (36.6 °C)-98.7 °F (37.1 °C)] 98.7 °F (37.1 °C)  Pulse:  [47-60] 49  Resp:  [14-20] 14  SpO2:  [92 %-94 %] 93 %  BP: (139-207)/(59-88) 173/77     Weight: 93 kg (205 lb 0.4 oz)  Body mass index is 31.17 kg/m².    Intake/Output Summary (Last 24 hours) at 4/12/2019 1342  Last data filed at 4/12/2019 1200  Gross per 24 hour   Intake 289.66 ml   Output 125 ml   Net 164.66 ml      Physical Exam   Constitutional: No distress.   Eyes: Right eye exhibits no discharge. Left eye exhibits no discharge.   Cardiovascular: An irregular rhythm present. Bradycardia present. Exam reveals no gallop and no friction rub.   No murmur heard.  Pulmonary/Chest: No stridor. No respiratory distress. He has no  wheezes. He has no rales.   Abdominal: He exhibits no distension and no mass. There is no tenderness. There is no rebound and no guarding. No hernia.   Musculoskeletal: He exhibits no edema or deformity.   Neurological: He is alert.   Skin: Skin is warm and dry. Capillary refill takes less than 2 seconds. He is not diaphoretic.   Nursing note and vitals reviewed.      Significant Labs: All pertinent labs within the past 24 hours have been reviewed.    Significant Imaging: I have reviewed all pertinent imaging results/findings within the past 24 hours.

## 2019-04-12 NOTE — ASSESSMENT & PLAN NOTE
Hold amlodipine   Start Hydralazine 25 mg PO TID   Hydralazine 10 mg as needed SBP > 170    4/11/19  Blood pressure uncontrolled  Hydralazine added    Uncontrolled   Hydralazine was increased to 100 mg po tid

## 2019-04-12 NOTE — ASSESSMENT & PLAN NOTE
Amlodipine on hold   Start Hydralazine at 50mg TID    4/12/19  -Agree with increasing Hydralazine to 100mg TID for HTN management   -Avoid CCB, BB and Clonidine  -He will need further med optimization as an OP

## 2019-04-12 NOTE — PLAN OF CARE
Problem: Adult Inpatient Plan of Care  Goal: Plan of Care Review  Outcome: Ongoing (interventions implemented as appropriate)  Pt AAOx4. Eritrean speaking only. VS monitored. Pt remained free of falls this shift. No complaints of pain or discomfort. Medications administered as ordered. Pt is Afib on monitor, HR 40s-50s. PIV intact, infusing Heparin. Hourly rounding completed. Pt instructed to call for assistance. POC reviewed with pt and spouse, verbalized understanding. Will continue to monitor.

## 2019-04-12 NOTE — ASSESSMENT & PLAN NOTE
BB, CCB on hold to avoid further bradycardia.   Takes Eliqius at home for CVA prophylaxis  Recommend Heparin gtt for now until decision made regarding device implant     4/12/19  -PAF with controlled rate   -no BB or CCB due to bradycardia  -OK to resume Eliqius.   -No PPM indicated at this time   -Recommend OP holter . Clinic will arrange holter and follow up

## 2019-04-12 NOTE — TELEPHONE ENCOUNTER
----- Message from ALEC Hitchcock sent at 4/12/2019  2:27 PM CDT -----  Patient needs hospital follow up appt with Dr. Lugo. Is a Thai speaking patient who would benefit from following with Dr. TEN Farrell

## 2019-04-12 NOTE — ASSESSMENT & PLAN NOTE
EKG shows junctional bradycardia 40's.   Advised to hold amlodipine for 36-48 hours before determining if PPM indicated.   Will monitor for now     4/11/19  -HR improving with holding amlodipine   -will continue to hold for now and make decision tomorrow whether PPM indicated.   No PPM indicated at this time     4/12/19  -No PPM indicated at this time   -no BB or CCB due to bradycardia  -OK to resume Eliqius.   -Recommend OP holter . Clinic will arrange holter and follow up

## 2019-04-12 NOTE — SUBJECTIVE & OBJECTIVE
Review of Systems   Constitution: Negative for diaphoresis, malaise/fatigue, weight gain and weight loss.   HENT: Negative for congestion and nosebleeds.    Cardiovascular: Negative for chest pain, claudication, cyanosis, dyspnea on exertion, irregular heartbeat, leg swelling, near-syncope, orthopnea, palpitations, paroxysmal nocturnal dyspnea and syncope.   Respiratory: Negative for cough, hemoptysis, shortness of breath, sleep disturbances due to breathing, snoring, sputum production and wheezing.    Hematologic/Lymphatic: Negative for bleeding problem. Does not bruise/bleed easily.   Skin: Negative for rash.   Musculoskeletal: Negative for arthritis, back pain, falls, joint pain, muscle cramps and muscle weakness.   Gastrointestinal: Negative for abdominal pain, constipation, diarrhea, heartburn, hematemesis, hematochezia, melena and nausea.   Genitourinary: Negative for dysuria, hematuria and nocturia.   Neurological: Negative for excessive daytime sleepiness, dizziness, headaches, light-headedness, loss of balance, numbness, vertigo and weakness.     Objective:     Vital Signs (Most Recent):  Temp: 98.7 °F (37.1 °C) (04/12/19 0711)  Pulse: (!) 48 (04/12/19 1300)  Resp: 14 (04/12/19 0711)  BP: (!) 173/77 (04/12/19 0711)  SpO2: (!) 93 % (04/12/19 0711) Vital Signs (24h Range):  Temp:  [97.9 °F (36.6 °C)-98.7 °F (37.1 °C)] 98.7 °F (37.1 °C)  Pulse:  [47-60] 48  Resp:  [14-20] 14  SpO2:  [92 %-94 %] 93 %  BP: (139-207)/(59-88) 173/77     Weight: 93 kg (205 lb 0.4 oz)  Body mass index is 31.17 kg/m².     SpO2: (!) 93 %  O2 Device (Oxygen Therapy): room air      Intake/Output Summary (Last 24 hours) at 4/12/2019 1430  Last data filed at 4/12/2019 1200  Gross per 24 hour   Intake 289.66 ml   Output 125 ml   Net 164.66 ml       Lines/Drains/Airways     Peripheral Intravenous Line                 Peripheral IV - Single Lumen 04/09/19 1612 Right Antecubital 2 days                Physical Exam   Constitutional: He is  oriented to person, place, and time. He appears well-developed and well-nourished.   Neck: Neck supple. No JVD present.   Cardiovascular: Regular rhythm, normal heart sounds and normal pulses. Bradycardia present. Exam reveals no friction rub.   No murmur heard.  Pulmonary/Chest: Effort normal and breath sounds normal. No respiratory distress. He has no wheezes. He has no rales.   Abdominal: Soft. Bowel sounds are normal. He exhibits no distension.   Musculoskeletal: He exhibits no edema or tenderness.   Neurological: He is alert and oriented to person, place, and time.   Skin: Skin is warm and dry. No rash noted.   Psychiatric: He has a normal mood and affect. His behavior is normal.   Nursing note and vitals reviewed.      Significant Labs:   All pertinent lab results from the last 24 hours have been reviewed. and   Recent Lab Results       04/12/19  0522        Anion Gap 8     aPTT 45.0  Comment:  aPTT therapeutic range = 39-69 seconds     Baso # 0.02     Basophil% 0.3     BUN, Bld 19     Calcium 9.7     Chloride 105     CO2 24     Creatinine 1.2     Differential Method Automated     eGFR if  >60     eGFR if non  55  Comment:  Calculation used to obtain the estimated glomerular filtration  rate (eGFR) is the CKD-EPI equation.        Eos # 0.1     Eosinophil% 1.4     Glucose 97     Gran # (ANC) 4.3     Gran% 60.5     Hematocrit 43.2     Hemoglobin 14.4     Lymph # 1.9     Lymph% 27.4     MCH 28.6     MCHC 33.3     MCV 86     Mono # 0.7     Mono% 10.4     MPV 10.6     Platelets 158     Potassium 4.3     RBC 5.03     RDW 15.6     Sodium 137     WBC 7.09           Significant Imaging: Echocardiogram:   2D echo with color flow doppler:   Results for orders placed or performed in visit on 11/15/18   2D Echo w/ Color Flow Doppler   Result Value Ref Range    QEF 60 55 - 65    Mitral Valve Regurgitation TRIVIAL     Aortic Valve Regurgitation MILD TO MODERATE (A)     Est. PA Systolic  Pressure 28.79

## 2019-04-13 VITALS
BODY MASS INDEX: 31.07 KG/M2 | HEART RATE: 49 BPM | WEIGHT: 205 LBS | TEMPERATURE: 98 F | OXYGEN SATURATION: 94 % | HEIGHT: 68 IN | RESPIRATION RATE: 18 BRPM | DIASTOLIC BLOOD PRESSURE: 60 MMHG | SYSTOLIC BLOOD PRESSURE: 135 MMHG

## 2019-04-13 LAB
ANION GAP SERPL CALC-SCNC: 8 MMOL/L (ref 8–16)
BASOPHILS # BLD AUTO: 0.02 K/UL (ref 0–0.2)
BASOPHILS NFR BLD: 0.3 % (ref 0–1.9)
BUN SERPL-MCNC: 17 MG/DL (ref 8–23)
CALCIUM SERPL-MCNC: 9.6 MG/DL (ref 8.7–10.5)
CHLORIDE SERPL-SCNC: 106 MMOL/L (ref 95–110)
CO2 SERPL-SCNC: 23 MMOL/L (ref 23–29)
CREAT SERPL-MCNC: 1.2 MG/DL (ref 0.5–1.4)
DIFFERENTIAL METHOD: ABNORMAL
EOSINOPHIL # BLD AUTO: 0.2 K/UL (ref 0–0.5)
EOSINOPHIL NFR BLD: 2.2 % (ref 0–8)
ERYTHROCYTE [DISTWIDTH] IN BLOOD BY AUTOMATED COUNT: 15.6 % (ref 11.5–14.5)
EST. GFR  (AFRICAN AMERICAN): >60 ML/MIN/1.73 M^2
EST. GFR  (NON AFRICAN AMERICAN): 55 ML/MIN/1.73 M^2
GLUCOSE SERPL-MCNC: 97 MG/DL (ref 70–110)
HCT VFR BLD AUTO: 41.3 % (ref 40–54)
HGB BLD-MCNC: 14 G/DL (ref 14–18)
LYMPHOCYTES # BLD AUTO: 1.7 K/UL (ref 1–4.8)
LYMPHOCYTES NFR BLD: 24.5 % (ref 18–48)
MCH RBC QN AUTO: 28.9 PG (ref 27–31)
MCHC RBC AUTO-ENTMCNC: 33.9 G/DL (ref 32–36)
MCV RBC AUTO: 85 FL (ref 82–98)
MONOCYTES # BLD AUTO: 0.9 K/UL (ref 0.3–1)
MONOCYTES NFR BLD: 12.5 % (ref 4–15)
NEUTROPHILS # BLD AUTO: 4.2 K/UL (ref 1.8–7.7)
NEUTROPHILS NFR BLD: 60.5 % (ref 38–73)
PLATELET # BLD AUTO: 154 K/UL (ref 150–350)
PMV BLD AUTO: 10.3 FL (ref 9.2–12.9)
POTASSIUM SERPL-SCNC: 4.1 MMOL/L (ref 3.5–5.1)
RBC # BLD AUTO: 4.84 M/UL (ref 4.6–6.2)
SODIUM SERPL-SCNC: 137 MMOL/L (ref 136–145)
WBC # BLD AUTO: 6.95 K/UL (ref 3.9–12.7)

## 2019-04-13 PROCEDURE — 36415 COLL VENOUS BLD VENIPUNCTURE: CPT

## 2019-04-13 PROCEDURE — 85025 COMPLETE CBC W/AUTO DIFF WBC: CPT

## 2019-04-13 PROCEDURE — 25000003 PHARM REV CODE 250: Performed by: NURSE PRACTITIONER

## 2019-04-13 PROCEDURE — 25000003 PHARM REV CODE 250: Performed by: INTERNAL MEDICINE

## 2019-04-13 PROCEDURE — 80048 BASIC METABOLIC PNL TOTAL CA: CPT

## 2019-04-13 RX ORDER — CANDESARTAN 8 MG/1
8 TABLET ORAL DAILY
Qty: 30 TABLET | Refills: 1 | Status: SHIPPED | OUTPATIENT
Start: 2019-04-14 | End: 2019-05-14

## 2019-04-13 RX ORDER — CANDESARTAN 4 MG/1
8 TABLET ORAL DAILY
Status: DISCONTINUED | OUTPATIENT
Start: 2019-04-13 | End: 2019-04-13 | Stop reason: HOSPADM

## 2019-04-13 RX ORDER — HYDRALAZINE HYDROCHLORIDE 100 MG/1
100 TABLET, FILM COATED ORAL EVERY 8 HOURS
Qty: 90 TABLET | Refills: 1 | Status: SHIPPED | OUTPATIENT
Start: 2019-04-13 | End: 2019-04-18 | Stop reason: ALTCHOICE

## 2019-04-13 RX ADMIN — CANDESARTAN CILEXETIL 8 MG: 4 TABLET ORAL at 01:04

## 2019-04-13 RX ADMIN — HYDRALAZINE HYDROCHLORIDE 100 MG: 50 TABLET ORAL at 06:04

## 2019-04-13 RX ADMIN — HYDRALAZINE HYDROCHLORIDE 100 MG: 50 TABLET ORAL at 01:04

## 2019-04-13 RX ADMIN — SERTRALINE HYDROCHLORIDE 100 MG: 50 TABLET ORAL at 08:04

## 2019-04-13 RX ADMIN — APIXABAN 5 MG: 2.5 TABLET, FILM COATED ORAL at 08:04

## 2019-04-13 NOTE — PLAN OF CARE
Problem: Adult Inpatient Plan of Care  Goal: Plan of Care Review  Outcome: Ongoing (interventions implemented as appropriate)  Pt AAOx4. [place]ymra[place] speaking only. VS monitored. Pt remained free of falls this shift. No complaints of pain or discomfort. Medications administered as ordered. Pt is Afib and Bradycardic on monitor. PIV intact, saline locked. Hourly rounding completed. Pt instructed to call for assistance. POC reviewed, spouse verbalized understanding. Will continue to monitor.

## 2019-04-14 NOTE — DISCHARGE SUMMARY
[facility] Hospital Medicine  Discharge Summary      Patient Name: Santos Morales  MRN: [id number]  Admission Date: 4/9/2019  Hospital Length of Stay: 3 days  Discharge Date and Time: 4/13/2019  3:42 PM  Attending Physician:  Corwin Carnes MD  Discharging Provider: Corwin Carnes MD  Primary Care Provider: Diane Bowen MD      HPI:   Mr Morales is a 84 year old male with PMHx of HTN and A Fib who presented to [facility] Emergency Room after being seen in cardiology clinic with symptomatic bradycardia. He has been experiencing increase generalize weakness and fatigue. Pt wife is at bedside and assistance in answering question. Emergency Room spoke with Cardiology, Dr Garnett recommend admission and hold amlodipine. He currently denies chest pain, shortness of breath, syncope, dizziness, nausea, vomiting, fever or chills. He may need pacemaker placement. Patient is a full code, however does not want to be intubated long term. Surrogate decision is his wife Rosa Sanchez.     * No surgery found *      Hospital Course:   Patient is a 84 year old male who was admitted with symptomatic bradycardia from cardiac clinic on yesterday. He has history of Anxiety, A Fib, HTN and SAMUEL. Amlodipine has been discontinued on yesterday. Cardiology consult, recommend holding for 48 hours, stop Eliquis in anticipation for PPM placement. Heparin drip started for CVA protections for A Fib.  As of 4/11/19 patient's heart rate is improving while amlodipine is on hold. No plans for PPM today. Blood pressure elevated. Oral hydralazine ordered.  4/12 Pt hr now  Is between 45 to 55 . Cardiology is not planning  For PPM . The blood pressure has been elevated and received a dose of clonidine last night.  The blood pressure medication hydralazine was increased to 100 mg po tid.  He has a h/o dementia.  Substituted Hydralazine 100 mg three times daily and once daily Candesartan 8 mg for blood pressure control.   Removed Amlodipine.  Rate stable in the upper 50s and asymptomatic.  Discharge plan to continue inpatient medication regimen.  Follow up with Dr. Lugo Thursday for further evaluation and medical optimization.     Consults:   Consults (From admission, onward)        Status Ordering Provider     Inpatient consult to Cardiology  Once     Provider:  (Not yet assigned)    Completed YURI ARAIZA            Final Active Diagnoses:    Diagnosis Date Noted POA    PRINCIPAL PROBLEM:  Symptomatic bradycardia [R00.1] 04/09/2019 Yes    Elevated brain natriuretic peptide (BNP) level [R79.89] 04/09/2019 Yes    PAF (paroxysmal atrial fibrillation) [I48.0] 11/01/2018 Yes    CKD (chronic kidney disease) stage 3, GFR 30-59 ml/min [N18.3] 08/23/2018 Yes    Depression [F32.9] 07/08/2016 Yes    Essential hypertension [I10] 03/25/2015 Yes      Problems Resolved During this Admission:       Discharged Condition: good    Disposition: Home or Self Care    Follow Up:  Follow-up Information     Alfredo Lugo MD On 4/18/2019.    Specialties:  Cardiology, Nuclear Medicine  Why:  Hospital follow up Bradycardia  Contact information:  [address]  [place] [address] [phone]                 Patient Instructions:      Diet Cardiac     Activity as tolerated       Significant Diagnostic Studies: Labs: All labs within the past 24 hours have been reviewed    Pending Diagnostic Studies:     None         Medications:  Reconciled Home Medications:      Medication List      START taking these medications    candesartan 8 MG tablet  Commonly known as:  ATACAND  Take 1 tablet (8 mg total) by mouth once daily.  Start taking on:  4/14/2019     hydrALAZINE 100 MG tablet  Commonly known as:  APRESOLINE  Take 1 tablet (100 mg total) by mouth every 8 (eight) hours.        CHANGE how you take these medications    furosemide 40 MG tablet  Commonly known as:  LASIX  Take 1 tablet (40 mg total) by mouth daily as needed. Take  next 3 days  What changed:    · when to take this  · additional instructions        CONTINUE taking these medications    CENTRUM SILVER MEN ORAL  Take 1 tablet by mouth once daily.     clonazePAM 0.5 MG tablet  Commonly known as:  KLONOPIN  Take 1 tablet (0.5 mg total) by mouth every evening.     ginkgo biloba 120 mg Tab  Take 1 tablet by mouth once daily.     melatonin 1 mg Tab  Take 1 tablet by mouth every evening.     * sertraline 100 MG tablet  Commonly known as:  ZOLOFT  Take 1 tablet (100 mg total) by mouth once daily.     * sertraline 50 MG tablet  Commonly known as:  ZOLOFT  1 tab po qd for a total of 150 mg daily     traZODone 150 MG tablet  Commonly known as:  DESYREL  TAKE ONE TABLET BY MOUTH ONCE DAILY IN THE EVENING         * This list has 2 medication(s) that are the same as other medications prescribed for you. Read the directions carefully, and ask your doctor or other care provider to review them with you.            STOP taking these medications    amLODIPine 10 MG tablet  Commonly known as:  NORVASC        ASK your doctor about these medications    ELIQUIS 5 mg Tab  Generic drug:  apixaban  Boyd j carlos tableta (5 mg en total) por vía oral 2 veces al día.  (Take 1 tablet (5 mg total) by mouth 2 (two) times daily.)            Indwelling Lines/Drains at time of discharge:   Lines/Drains/Airways          None          Time spent on the discharge of patient: 30 minutes  Patient was seen and examined on the date of discharge and determined to be suitable for discharge.         Corwin Carnes MD  Department of Hospital Medicine  [facility]

## 2019-04-15 NOTE — PLAN OF CARE
Apr18 Established Patient Visit todd Lugo MD   Thursday Apr 18, 2019 8:30 AM   Arrive at check-in approximately 15 minutes before your scheduled appointment time. Bring all outside medical records and imaging, along with a list of your current medications and insurance card.    3rd Floor - From [address], take the [facility] exit ([address]). Enter the facility from the [address]  [facility]   [address]  [place] [address]   [phone]                 04/15/19 0751   Final Note   Assessment Type Final Discharge Note   Anticipated Discharge Disposition Home   Hospital Follow Up  Appt(s) scheduled? Yes   Right Care Referral Info   Post Acute Recommendation No Care

## 2019-04-18 ENCOUNTER — CLINICAL SUPPORT (OUTPATIENT)
Dept: CARDIOLOGY | Facility: CLINIC | Age: 84
End: 2019-04-18
Payer: COMMERCIAL

## 2019-04-18 ENCOUNTER — OFFICE VISIT (OUTPATIENT)
Dept: CARDIOLOGY | Facility: CLINIC | Age: 84
End: 2019-04-18
Payer: COMMERCIAL

## 2019-04-18 VITALS
HEART RATE: 40 BPM | WEIGHT: 197.31 LBS | DIASTOLIC BLOOD PRESSURE: 40 MMHG | HEIGHT: 68 IN | SYSTOLIC BLOOD PRESSURE: 152 MMHG | BODY MASS INDEX: 29.9 KG/M2

## 2019-04-18 DIAGNOSIS — I35.1 NONRHEUMATIC AORTIC VALVE INSUFFICIENCY: Chronic | ICD-10-CM

## 2019-04-18 DIAGNOSIS — I48.19 PERSISTENT ATRIAL FIBRILLATION: Primary | ICD-10-CM

## 2019-04-18 DIAGNOSIS — I48.19 ATRIAL FIBRILLATION, PERSISTENT: Chronic | ICD-10-CM

## 2019-04-18 DIAGNOSIS — I50.32 CHRONIC DIASTOLIC HEART FAILURE: ICD-10-CM

## 2019-04-18 DIAGNOSIS — I10 ESSENTIAL HYPERTENSION: Primary | ICD-10-CM

## 2019-04-18 DIAGNOSIS — I48.19 PERSISTENT ATRIAL FIBRILLATION: ICD-10-CM

## 2019-04-18 PROCEDURE — 99999 PR PBB SHADOW E&M-EST. PATIENT-LVL III: ICD-10-PCS | Mod: PBBFAC,,, | Performed by: NUCLEAR MEDICINE

## 2019-04-18 PROCEDURE — 93000 EKG 12-LEAD: ICD-10-PCS | Mod: S$GLB,,, | Performed by: INTERNAL MEDICINE

## 2019-04-18 PROCEDURE — 1101F PR PT FALLS ASSESS DOC 0-1 FALLS W/OUT INJ PAST YR: ICD-10-PCS | Mod: CPTII,S$GLB,, | Performed by: NUCLEAR MEDICINE

## 2019-04-18 PROCEDURE — 3077F SYST BP >= 140 MM HG: CPT | Mod: CPTII,S$GLB,, | Performed by: NUCLEAR MEDICINE

## 2019-04-18 PROCEDURE — 99215 OFFICE O/P EST HI 40 MIN: CPT | Mod: S$GLB,,, | Performed by: NUCLEAR MEDICINE

## 2019-04-18 PROCEDURE — 1101F PT FALLS ASSESS-DOCD LE1/YR: CPT | Mod: CPTII,S$GLB,, | Performed by: NUCLEAR MEDICINE

## 2019-04-18 PROCEDURE — 99215 PR OFFICE/OUTPT VISIT, EST, LEVL V, 40-54 MIN: ICD-10-PCS | Mod: S$GLB,,, | Performed by: NUCLEAR MEDICINE

## 2019-04-18 PROCEDURE — 3078F PR MOST RECENT DIASTOLIC BLOOD PRESSURE < 80 MM HG: ICD-10-PCS | Mod: CPTII,S$GLB,, | Performed by: NUCLEAR MEDICINE

## 2019-04-18 PROCEDURE — 99999 PR PBB SHADOW E&M-EST. PATIENT-LVL III: CPT | Mod: PBBFAC,,, | Performed by: NUCLEAR MEDICINE

## 2019-04-18 PROCEDURE — 93000 ELECTROCARDIOGRAM COMPLETE: CPT | Mod: S$GLB,,, | Performed by: INTERNAL MEDICINE

## 2019-04-18 PROCEDURE — 3078F DIAST BP <80 MM HG: CPT | Mod: CPTII,S$GLB,, | Performed by: NUCLEAR MEDICINE

## 2019-04-18 PROCEDURE — 3077F PR MOST RECENT SYSTOLIC BLOOD PRESSURE >= 140 MM HG: ICD-10-PCS | Mod: CPTII,S$GLB,, | Performed by: NUCLEAR MEDICINE

## 2019-04-18 RX ORDER — SPIRONOLACTONE 25 MG/1
25 TABLET ORAL DAILY
Qty: 30 TABLET | Refills: 6 | Status: SHIPPED | OUTPATIENT
Start: 2019-04-18 | End: 2019-11-03 | Stop reason: SDUPTHER

## 2019-04-18 NOTE — PROGRESS NOTES
Subjective:   Patient ID:  Santos Morales is a 84 y.o. male who presents for follow-up of Atrial Fibrillation; Hypertension; and Congestive Heart Failure      HPI HOSPITAL FOLLOW UP - [facility]- 4/9 TO 4/13/18- CHRONIC PERSISTENT AF, WITH SLOW VR.- AND FINDINGS OF ADHF- PEF - EF 60%- ECHO 11/18    BRADYCARDIA WAS CONSIDERED TO BE  DRUG RELATED? AMLODIPINE?  PATIENT HAS HX OF ESSENTIAL HTN, NON VALVULAR AR, CKD STAGE 3, SAMUEL /CPAP  AND DEMENTIA  PRESENTLY  HIS ECG SHOWING AF WITH VR 48 BMP.  NO ACUTE ST T CHANGES   EDEMA OF LE HAS IMPROVED.  AND ALSO FATIGUE ,   NO ABNORMAL BLEEDING- ON NOAC  NO FOCAL CNS SYMPTOMS TO SUGGEST TIA OR STROKE  NO ANGINA OR EQUIVALENT- NO PLEURITIC CP    Review of Systems   Constitution: Positive for malaise/fatigue. Negative for chills, fever, night sweats, weight gain and weight loss.   HENT: Negative for nosebleeds.    Eyes: Negative for blurred vision, double vision and visual disturbance.   Cardiovascular: Negative for chest pain, dyspnea on exertion, irregular heartbeat, leg swelling, orthopnea, palpitations, paroxysmal nocturnal dyspnea and syncope.   Respiratory: Negative for cough, hemoptysis and wheezing.    Endocrine: Negative for polydipsia and polyuria.   Hematologic/Lymphatic: Does not bruise/bleed easily.   Skin: Negative for rash.   Musculoskeletal: Negative for joint pain, joint swelling, muscle weakness and myalgias.   Gastrointestinal: Negative for abdominal pain, hematemesis, jaundice and melena.   Genitourinary: Negative for dysuria, hematuria and nocturia.   Neurological: Negative for dizziness, focal weakness, headaches, sensory change and weakness.   Psychiatric/Behavioral: Positive for altered mental status. Negative for depression. The patient does not have insomnia and is not nervous/anxious.      Family History   Problem Relation Age of Onset    Cancer Father         Stomach Ca     Diabetes Paternal Grandmother      Past Medical History:   Diagnosis Date     Anxiety     Atrial fibrillation     Depression     Hypertension     Insomnia     Mobitz type 1 second degree AV block     followed by Dr. Hardy, Cardiology    SAMUEL (obstructive sleep apnea)      Social History     Socioeconomic History    Marital status:      Spouse name: Not on file    Number of children: Not on file    Years of education: Not on file    Highest education level: Not on file   Occupational History    Not on file   Social Needs    Financial resource strain: Not on file    Food insecurity:     Worry: Not on file     Inability: Not on file    Transportation needs:     Medical: Not on file     Non-medical: Not on file   Tobacco Use    Smoking status: Never Smoker    Smokeless tobacco: Never Used   Substance and Sexual Activity    Alcohol use: Yes     Comment: socially     Drug use: No    Sexual activity: Not on file   Lifestyle    Physical activity:     Days per week: Not on file     Minutes per session: Not on file    Stress: Not on file   Relationships    Social connections:     Talks on phone: Not on file     Gets together: Not on file     Attends Congregation service: Not on file     Active member of club or organization: Not on file     Attends meetings of clubs or organizations: Not on file     Relationship status: Not on file   Other Topics Concern    Not on file   Social History Narrative    Not on file     Current Outpatient Medications on File Prior to Visit   Medication Sig Dispense Refill    apixaban (ELIQUIS) 5 mg Tab Take 1 tablet (5 mg total) by mouth 2 (two) times daily. 60 tablet 3    candesartan (ATACAND) 8 MG tablet Take 1 tablet (8 mg total) by mouth once daily. 30 tablet 1    clonazePAM (KLONOPIN) 0.5 MG tablet Take 1 tablet (0.5 mg total) by mouth every evening. 30 tablet 2    furosemide (LASIX) 40 MG tablet Take 1 tablet (40 mg total) by mouth daily as needed. Take next 3 days 90 tablet 3    melatonin 1 mg Tab Take 1 tablet by mouth every evening.       multivit-min/FA/lycopen/lutein (CENTRUM SILVER MEN ORAL) Take 1 tablet by mouth once daily.      sertraline (ZOLOFT) 100 MG tablet Take 1 tablet (100 mg total) by mouth once daily. 90 tablet 3    sertraline (ZOLOFT) 50 MG tablet 1 tab po qd for a total of 150 mg daily 30 tablet 2    traZODone (DESYREL) 150 MG tablet TAKE ONE TABLET BY MOUTH ONCE DAILY IN THE EVENING 30 tablet 11    [DISCONTINUED] hydrALAZINE (APRESOLINE) 100 MG tablet Take 1 tablet (100 mg total) by mouth every 8 (eight) hours. 90 tablet 1    [DISCONTINUED] ginkgo biloba 120 mg Tab Take 1 tablet by mouth once daily.        No current facility-administered medications on file prior to visit.      Review of patient's allergies indicates:  No Known Allergies    Objective:     Physical Exam   Constitutional: He is oriented to person, place, and time. He appears well-developed. No distress.   HENT:   Head: Normocephalic.   Eyes: Pupils are equal, round, and reactive to light. Conjunctivae are normal.   Neck: Neck supple. No JVD present. No thyromegaly present.   Cardiovascular: Intact distal pulses. An irregularly irregular rhythm present. Bradycardia present. Exam reveals no gallop and no friction rub.   Murmur heard.   Medium-pitched blowing decrescendo early diastolic murmur is present with a grade of 3/6 at the upper left sternal border and lower left sternal border radiating to the apex.  Pulses:       Carotid pulses are 2+ on the right side, and 2+ on the left side.       Radial pulses are 2+ on the right side, and 2+ on the left side.        Femoral pulses are 2+ on the right side, and 2+ on the left side.       Popliteal pulses are 2+ on the right side, and 2+ on the left side.        Dorsalis pedis pulses are 2+ on the right side, and 2+ on the left side.        Posterior tibial pulses are 2+ on the right side, and 2+ on the left side.   Pulmonary/Chest: Breath sounds normal. He has no wheezes. He has no rales. He exhibits no  tenderness.   Abdominal: Soft. Bowel sounds are normal. He exhibits no mass. There is no hepatosplenomegaly. There is no tenderness.   Musculoskeletal: He exhibits no edema or tenderness.        Cervical back: Normal.        Thoracic back: Normal.        Lumbar back: Normal.   Lymphadenopathy:     He has no cervical adenopathy.     He has no axillary adenopathy.        Right: No supraclavicular adenopathy present.        Left: No supraclavicular adenopathy present.   Neurological: He is alert and oriented to person, place, and time. He has normal strength. No sensory deficit. Gait normal.   Skin: Skin is warm. No cyanosis. No pallor. Nails show no clubbing.   Psychiatric: He has a normal mood and affect. His speech is normal and behavior is normal. Cognition and memory are normal.       Assessment:     1. Essential hypertension    2. Atrial fibrillation, persistent    3. Chronic diastolic heart failure    4. Nonrheumatic aortic valve insufficiency        Plan:     Essential hypertension  SBP NOT AT GOAL LESS THAN 140  START MCA  -     spironolactone (ALDACTONE) 25 MG tablet; Take 1 tablet (25 mg total) by mouth once daily.  Dispense: 30 tablet; Refill: 6  -     Lipid panel; Future; Expected date: 04/25/2019  -     Basic metabolic panel; Future; Expected date: 04/25/2019  -     Brain natriuretic peptide; Future; Expected date: 04/25/2019    Atrial fibrillation, persistent  AF WITH SLOW VR-  WE NEED TO EVALUATE FOR SND,  AFTER BP AND HF WELL CONTROLLED  -     Lipid panel; Future; Expected date: 04/25/2019    Chronic diastolic heart failure  CLINICALLY IMPROVED  -     spironolactone (ALDACTONE) 25 MG tablet; Take 1 tablet (25 mg total) by mouth once daily.  Dispense: 30 tablet; Refill: 6  -     Basic metabolic panel; Future; Expected date: 04/25/2019  -     Brain natriuretic peptide; Future; Expected date: 04/25/2019    Nonrheumatic aortic valve insufficiency  NO UNUSUAL FEVER OR CHILLS      RETURN IN 4 WEEKS WITH BMP,  BNP AND LIPID PANEL

## 2019-04-24 ENCOUNTER — LAB VISIT (OUTPATIENT)
Dept: LAB | Facility: HOSPITAL | Age: 84
End: 2019-04-24
Attending: NUCLEAR MEDICINE
Payer: COMMERCIAL

## 2019-04-24 DIAGNOSIS — I10 ESSENTIAL HYPERTENSION: ICD-10-CM

## 2019-04-24 DIAGNOSIS — I50.32 CHRONIC DIASTOLIC HEART FAILURE: ICD-10-CM

## 2019-04-24 DIAGNOSIS — I48.19 ATRIAL FIBRILLATION, PERSISTENT: Chronic | ICD-10-CM

## 2019-04-24 LAB
ANION GAP SERPL CALC-SCNC: 5 MMOL/L (ref 8–16)
BNP SERPL-MCNC: 738 PG/ML (ref 0–99)
BUN SERPL-MCNC: 34 MG/DL (ref 8–23)
CALCIUM SERPL-MCNC: 9.6 MG/DL (ref 8.7–10.5)
CHLORIDE SERPL-SCNC: 105 MMOL/L (ref 95–110)
CHOLEST SERPL-MCNC: 135 MG/DL (ref 120–199)
CHOLEST/HDLC SERPL: 2.9 {RATIO} (ref 2–5)
CO2 SERPL-SCNC: 30 MMOL/L (ref 23–29)
CREAT SERPL-MCNC: 1.5 MG/DL (ref 0.5–1.4)
EST. GFR  (AFRICAN AMERICAN): 48.7 ML/MIN/1.73 M^2
EST. GFR  (NON AFRICAN AMERICAN): 42.1 ML/MIN/1.73 M^2
GLUCOSE SERPL-MCNC: 94 MG/DL (ref 70–110)
HDLC SERPL-MCNC: 46 MG/DL (ref 40–75)
HDLC SERPL: 34.1 % (ref 20–50)
LDLC SERPL CALC-MCNC: 76.6 MG/DL (ref 63–159)
NONHDLC SERPL-MCNC: 89 MG/DL
POTASSIUM SERPL-SCNC: 5 MMOL/L (ref 3.5–5.1)
SODIUM SERPL-SCNC: 140 MMOL/L (ref 136–145)
TRIGL SERPL-MCNC: 62 MG/DL (ref 30–150)

## 2019-04-24 PROCEDURE — 80048 BASIC METABOLIC PNL TOTAL CA: CPT

## 2019-04-24 PROCEDURE — 83880 ASSAY OF NATRIURETIC PEPTIDE: CPT

## 2019-04-24 PROCEDURE — 36415 COLL VENOUS BLD VENIPUNCTURE: CPT

## 2019-04-24 PROCEDURE — 80061 LIPID PANEL: CPT

## 2019-05-14 ENCOUNTER — PATIENT MESSAGE (OUTPATIENT)
Dept: CARDIOLOGY | Facility: CLINIC | Age: 84
End: 2019-05-14

## 2019-05-14 ENCOUNTER — LAB VISIT (OUTPATIENT)
Dept: LAB | Facility: HOSPITAL | Age: 84
End: 2019-05-14
Attending: NUCLEAR MEDICINE
Payer: COMMERCIAL

## 2019-05-14 ENCOUNTER — OFFICE VISIT (OUTPATIENT)
Dept: CARDIOLOGY | Facility: CLINIC | Age: 84
End: 2019-05-14
Payer: COMMERCIAL

## 2019-05-14 VITALS
HEIGHT: 68 IN | BODY MASS INDEX: 30.01 KG/M2 | DIASTOLIC BLOOD PRESSURE: 40 MMHG | SYSTOLIC BLOOD PRESSURE: 160 MMHG | HEART RATE: 39 BPM | WEIGHT: 198 LBS

## 2019-05-14 DIAGNOSIS — I35.1 NONRHEUMATIC AORTIC VALVE INSUFFICIENCY: Chronic | ICD-10-CM

## 2019-05-14 DIAGNOSIS — R00.1 BRADYCARDIA: Primary | ICD-10-CM

## 2019-05-14 DIAGNOSIS — I10 ESSENTIAL HYPERTENSION: ICD-10-CM

## 2019-05-14 DIAGNOSIS — I50.32 CHRONIC DIASTOLIC HEART FAILURE: Primary | ICD-10-CM

## 2019-05-14 DIAGNOSIS — F32.9 MAJOR DEPRESSION, CHRONIC: ICD-10-CM

## 2019-05-14 DIAGNOSIS — I48.19 ATRIAL FIBRILLATION, PERSISTENT: Chronic | ICD-10-CM

## 2019-05-14 DIAGNOSIS — I50.32 CHRONIC DIASTOLIC HEART FAILURE: ICD-10-CM

## 2019-05-14 LAB
ANION GAP SERPL CALC-SCNC: 5 MMOL/L (ref 8–16)
BNP SERPL-MCNC: 817 PG/ML (ref 0–99)
BUN SERPL-MCNC: 30 MG/DL (ref 8–23)
CALCIUM SERPL-MCNC: 9.8 MG/DL (ref 8.7–10.5)
CHLORIDE SERPL-SCNC: 107 MMOL/L (ref 95–110)
CO2 SERPL-SCNC: 25 MMOL/L (ref 23–29)
CREAT SERPL-MCNC: 1.3 MG/DL (ref 0.5–1.4)
EST. GFR  (AFRICAN AMERICAN): 58 ML/MIN/1.73 M^2
EST. GFR  (NON AFRICAN AMERICAN): 50 ML/MIN/1.73 M^2
GLUCOSE SERPL-MCNC: 77 MG/DL (ref 70–110)
POTASSIUM SERPL-SCNC: 5 MMOL/L (ref 3.5–5.1)
SODIUM SERPL-SCNC: 137 MMOL/L (ref 136–145)

## 2019-05-14 PROCEDURE — 3078F DIAST BP <80 MM HG: CPT | Mod: CPTII,S$GLB,, | Performed by: NUCLEAR MEDICINE

## 2019-05-14 PROCEDURE — 99999 PR PBB SHADOW E&M-EST. PATIENT-LVL III: CPT | Mod: PBBFAC,,, | Performed by: NUCLEAR MEDICINE

## 2019-05-14 PROCEDURE — 99215 OFFICE O/P EST HI 40 MIN: CPT | Mod: S$GLB,,, | Performed by: NUCLEAR MEDICINE

## 2019-05-14 PROCEDURE — 1101F PT FALLS ASSESS-DOCD LE1/YR: CPT | Mod: CPTII,S$GLB,, | Performed by: NUCLEAR MEDICINE

## 2019-05-14 PROCEDURE — 1101F PR PT FALLS ASSESS DOC 0-1 FALLS W/OUT INJ PAST YR: ICD-10-PCS | Mod: CPTII,S$GLB,, | Performed by: NUCLEAR MEDICINE

## 2019-05-14 PROCEDURE — 80048 BASIC METABOLIC PNL TOTAL CA: CPT

## 2019-05-14 PROCEDURE — 99999 PR PBB SHADOW E&M-EST. PATIENT-LVL III: ICD-10-PCS | Mod: PBBFAC,,, | Performed by: NUCLEAR MEDICINE

## 2019-05-14 PROCEDURE — 3077F PR MOST RECENT SYSTOLIC BLOOD PRESSURE >= 140 MM HG: ICD-10-PCS | Mod: CPTII,S$GLB,, | Performed by: NUCLEAR MEDICINE

## 2019-05-14 PROCEDURE — 83880 ASSAY OF NATRIURETIC PEPTIDE: CPT

## 2019-05-14 PROCEDURE — 3077F SYST BP >= 140 MM HG: CPT | Mod: CPTII,S$GLB,, | Performed by: NUCLEAR MEDICINE

## 2019-05-14 PROCEDURE — 99215 PR OFFICE/OUTPT VISIT, EST, LEVL V, 40-54 MIN: ICD-10-PCS | Mod: S$GLB,,, | Performed by: NUCLEAR MEDICINE

## 2019-05-14 PROCEDURE — 3078F PR MOST RECENT DIASTOLIC BLOOD PRESSURE < 80 MM HG: ICD-10-PCS | Mod: CPTII,S$GLB,, | Performed by: NUCLEAR MEDICINE

## 2019-05-14 PROCEDURE — 36415 COLL VENOUS BLD VENIPUNCTURE: CPT

## 2019-05-14 RX ORDER — CANDESARTAN 8 MG/1
16 TABLET ORAL DAILY
Qty: 30 TABLET | Refills: 6 | Status: SHIPPED | OUTPATIENT
Start: 2019-05-14 | End: 2019-05-17 | Stop reason: SDUPTHER

## 2019-05-14 RX ORDER — SERTRALINE HYDROCHLORIDE 100 MG/1
100 TABLET, FILM COATED ORAL DAILY
Qty: 90 TABLET | Refills: 0 | Status: SHIPPED | OUTPATIENT
Start: 2019-05-14 | End: 2019-12-09 | Stop reason: SDUPTHER

## 2019-05-14 RX ORDER — SERTRALINE HYDROCHLORIDE 50 MG/1
TABLET, FILM COATED ORAL
Qty: 90 TABLET | Refills: 0 | Status: SHIPPED | OUTPATIENT
Start: 2019-05-14 | End: 2019-09-09 | Stop reason: SDUPTHER

## 2019-05-14 NOTE — PROGRESS NOTES
Subjective:   Patient ID:  Santos Morales is a 84 y.o. male who presents for follow-up of Congestive Heart Failure (HFPEF); Hypertension (ESSENTIAL); Valvular Heart Disease (AR.); and Atrial Fibrillation (PERSISTENT)      HPI BREATHING PATTERN HAS IMPROVED- ONEIL. NO PND. NO ORTHOPNEA  HOWEVER SBP AT HOME CONTINUES ELEVATED -170  AND HR  REMAINS SLOW- AVG 35-40  NO CHEST DISCOMFORT. NO EXACERBATION OF PALPITATIONS  NO FOCAL CNS SYMPTOMS OR SIGNS TO SUGGEST TIA OR STROKE  NO ABNORMAL BLEEDING- ON ELIQUIS  NO NEAR SYNCOPE OR SYNCOPE. NO FREQUENT FALLS.    Review of Systems   Constitution: Positive for malaise/fatigue. Negative for chills, fever, night sweats, weight gain and weight loss.   HENT: Negative for nosebleeds.    Eyes: Negative for blurred vision, double vision and visual disturbance.   Cardiovascular: Positive for dyspnea on exertion and irregular heartbeat. Negative for chest pain, leg swelling, orthopnea, palpitations, paroxysmal nocturnal dyspnea and syncope.   Respiratory: Negative for cough, hemoptysis and wheezing.    Endocrine: Negative for polydipsia and polyuria.   Hematologic/Lymphatic: Does not bruise/bleed easily.   Skin: Negative for rash.   Musculoskeletal: Negative for joint pain, joint swelling, muscle weakness and myalgias.   Gastrointestinal: Negative for abdominal pain, hematemesis, jaundice and melena.   Genitourinary: Negative for dysuria, hematuria and nocturia.   Neurological: Negative for dizziness, focal weakness, headaches, sensory change and weakness.   Psychiatric/Behavioral: Positive for memory loss. Negative for depression. The patient does not have insomnia and is not nervous/anxious.      Family History   Problem Relation Age of Onset    Cancer Father         Stomach Ca     Diabetes Paternal Grandmother      Past Medical History:   Diagnosis Date    Anxiety     Atrial fibrillation     Depression     Hypertension     Insomnia     Mobitz type 1 second degree AV  block     followed by Dr. Hardy, Cardiology    SAMUEL (obstructive sleep apnea)      Social History     Socioeconomic History    Marital status:      Spouse name: Not on file    Number of children: Not on file    Years of education: Not on file    Highest education level: Not on file   Occupational History    Not on file   Social Needs    Financial resource strain: Not on file    Food insecurity:     Worry: Not on file     Inability: Not on file    Transportation needs:     Medical: Not on file     Non-medical: Not on file   Tobacco Use    Smoking status: Never Smoker    Smokeless tobacco: Never Used   Substance and Sexual Activity    Alcohol use: Yes     Comment: socially     Drug use: No    Sexual activity: Not on file   Lifestyle    Physical activity:     Days per week: Not on file     Minutes per session: Not on file    Stress: Not on file   Relationships    Social connections:     Talks on phone: Not on file     Gets together: Not on file     Attends Confucianist service: Not on file     Active member of club or organization: Not on file     Attends meetings of clubs or organizations: Not on file     Relationship status: Not on file   Other Topics Concern    Not on file   Social History Narrative    Not on file     Current Outpatient Medications on File Prior to Visit   Medication Sig Dispense Refill    apixaban (ELIQUIS) 5 mg Tab Take 1 tablet (5 mg total) by mouth 2 (two) times daily. 60 tablet 3    clonazePAM (KLONOPIN) 0.5 MG tablet Take 1 tablet (0.5 mg total) by mouth every evening. 30 tablet 2    furosemide (LASIX) 40 MG tablet Take 1 tablet (40 mg total) by mouth daily as needed. Take next 3 days 90 tablet 3    melatonin 1 mg Tab Take 1 tablet by mouth every evening.      spironolactone (ALDACTONE) 25 MG tablet Take 1 tablet (25 mg total) by mouth once daily. 30 tablet 6    traZODone (DESYREL) 150 MG tablet TAKE ONE TABLET BY MOUTH ONCE DAILY IN THE EVENING 30 tablet 11     [DISCONTINUED] candesartan (ATACAND) 8 MG tablet Take 1 tablet (8 mg total) by mouth once daily. 30 tablet 1    [DISCONTINUED] sertraline (ZOLOFT) 100 MG tablet Take 1 tablet (100 mg total) by mouth once daily. 90 tablet 3    [DISCONTINUED] multivit-min/FA/lycopen/lutein (CENTRUM SILVER MEN ORAL) Take 1 tablet by mouth once daily.      [DISCONTINUED] sertraline (ZOLOFT) 50 MG tablet 1 tab po qd for a total of 150 mg daily 30 tablet 2     No current facility-administered medications on file prior to visit.      Review of patient's allergies indicates:  No Known Allergies    Objective:     Physical Exam   Constitutional: He is oriented to person, place, and time. He appears well-developed. No distress.   HENT:   Head: Normocephalic.   Eyes: Pupils are equal, round, and reactive to light. Conjunctivae are normal.   Neck: Neck supple. No JVD present. No thyromegaly present.   Cardiovascular: Regular rhythm and intact distal pulses. Bradycardia present. Exam reveals no gallop and no friction rub.   Murmur heard.   Medium-pitched harsh crescendo-decrescendo midsystolic murmur is present with a grade of 2/6 at the upper right sternal border and upper left sternal border.  High-pitched blowing decrescendo early diastolic murmur is present with a grade of 3/6 at the upper left sternal border and lower left sternal border radiating to the apex. The intensity increases with respiration.  Pulses:       Carotid pulses are 2+ on the right side, and 2+ on the left side.       Radial pulses are 2+ on the right side, and 2+ on the left side.        Femoral pulses are 2+ on the right side, and 2+ on the left side.       Popliteal pulses are 2+ on the right side, and 2+ on the left side.        Dorsalis pedis pulses are 2+ on the right side, and 2+ on the left side.        Posterior tibial pulses are 2+ on the right side, and 2+ on the left side.   Pulmonary/Chest: Breath sounds normal. He has no wheezes. He has no rales. He  exhibits no tenderness.   Abdominal: Soft. Bowel sounds are normal. He exhibits no mass. There is no hepatosplenomegaly. There is no tenderness.   Musculoskeletal: He exhibits no edema or tenderness.        Cervical back: Normal.        Thoracic back: Normal.        Lumbar back: Normal.   Lymphadenopathy:     He has no cervical adenopathy.     He has no axillary adenopathy.        Right: No supraclavicular adenopathy present.        Left: No supraclavicular adenopathy present.   Neurological: He is alert and oriented to person, place, and time. He has normal strength. No sensory deficit. Gait normal.   Skin: Skin is warm. No cyanosis. No pallor. Nails show no clubbing.   Psychiatric: He has a normal mood and affect. His speech is normal and behavior is normal. Cognition and memory are normal.       Assessment:     1. Chronic diastolic heart failure    2. Essential hypertension    3. Atrial fibrillation, persistent    4. Nonrheumatic aortic valve insufficiency        Plan:     Chronic diastolic heart failure  CLINICALLY COMPENSATED HF  NO EVIDENCE OF ACTIVE MYOCARDIAL ISCHEMIA  -     candesartan (ATACAND) 8 MG tablet; Take 2 tablets (16 mg total) by mouth once daily.  Dispense: 30 tablet; Refill: 6  -     Basic metabolic panel; Future; Expected date: 05/14/2019  -     Brain natriuretic peptide; Future; Expected date: 05/14/2019    Essential hypertension  SBP NO WELL CONTROLLED  WIDE PULSE PRESSURE  RELATED TO AR  -     candesartan (ATACAND) 8 MG tablet; Take 2 tablets (16 mg total) by mouth once daily.  Dispense: 30 tablet; Refill: 6  -     Basic metabolic panel; Future; Expected date: 05/14/2019  -     Brain natriuretic peptide; Future; Expected date: 05/14/2019    Atrial fibrillation, persistent  AF WITH PERSISTENT SLOW VR. WITHOUT AV BLOCKER MEDS  MOST LIKELY INDICATED SINUS NODE DYSFUNCTION  REFER TO EP ,DR. ZAMUDIO FOR EVALUATION AND RECOMMENDATIONS-  PACEMAKER, RHYTHM CONTROL VS RATE CONTROL    Nonrheumatic  aortic valve insufficiency  LV ED  DIMENSIONS AND LV EF - STABLE      1- INCREASE ATACAND OT 16 MG DAILY    2- CONSULT EP DR ZAMUDIO    3- ECG AND BMP AND BNP

## 2019-05-14 NOTE — TELEPHONE ENCOUNTER
----- Message from Paty Bauman sent at 5/14/2019  8:56 AM CDT -----  Contact: Jaycee/[facility]  Type:  Pharmacy Calling to Clarify an RX    Name of Caller:Jaycee  Pharmacy Name:[facility]  Prescription Name:generic zoloft  What do they need to clarify?:verify directions  Best Call Back Number:[phone]  Additional Information: .    Thank you

## 2019-05-15 ENCOUNTER — TELEPHONE (OUTPATIENT)
Dept: CARDIOLOGY | Facility: CLINIC | Age: 84
End: 2019-05-15

## 2019-05-15 NOTE — TELEPHONE ENCOUNTER
Called patient's spouse , due to patient does not speak or understand english well.    No answer, left message to return call.

## 2019-05-15 NOTE — TELEPHONE ENCOUNTER
----- Message from Rebecca Lama sent at 5/15/2019 12:02 PM CDT -----  Contact: wife- mrs- [phone]  would like to consult with the nurse, patients is returning the nurse call, please call back at [phone], thank sj  .Type:  Patient Returning Call    Who Called: mrs avilez  Who Left Message for Patient:mateo  Does the patient know what this is regarding?no  Would the patient rather a call back or a response via [facility]? Call back  Best Call Back Number:[phone]  Additional Information:

## 2019-05-15 NOTE — TELEPHONE ENCOUNTER
Spouse returned call and was informed labs from 05/14/2015    RENAL FUNCTION AND ELECTROLYTES ARE OK.     Spouse verbalized understanding and mention insurance will not dispense candesartan 8mg, 2 tablets by mouth.    Spouse was informed an prior authorization has been initiated and awaiting response, normally take 24-72 hours.    Spouse stated have 18 of the 8mg tablets remaining.   Spouse was instructed to give 2 of the 8mg tablets until insurance makes decision to approve or not medication.    Spouse stated she understood direcrtions to be 2 og the 16mg tablets daily.    I then had Dr. Lugo to speak in [place] with patient spouse and confirmed dosage is 2 of the 8mg tablets daily.

## 2019-05-15 NOTE — TELEPHONE ENCOUNTER
----- Message from Alfredo Lugo MD sent at 5/14/2019  3:42 PM CDT -----  We need to call patient today.RENAL FUNCTION AND ELECTROLYTES ARE OK. CONTINUE  PRESENT CARD MANAGEMENT

## 2019-05-16 ENCOUNTER — CLINICAL SUPPORT (OUTPATIENT)
Dept: CARDIOLOGY | Facility: CLINIC | Age: 84
End: 2019-05-16
Payer: COMMERCIAL

## 2019-05-16 ENCOUNTER — INITIAL CONSULT (OUTPATIENT)
Dept: CARDIOLOGY | Facility: CLINIC | Age: 84
End: 2019-05-16
Payer: COMMERCIAL

## 2019-05-16 DIAGNOSIS — I48.19 ATRIAL FIBRILLATION, PERSISTENT: Primary | Chronic | ICD-10-CM

## 2019-05-16 DIAGNOSIS — R00.1 SYMPTOMATIC BRADYCARDIA: ICD-10-CM

## 2019-05-16 DIAGNOSIS — R00.1 BRADYCARDIA: ICD-10-CM

## 2019-05-16 PROCEDURE — 99999 PR PBB SHADOW E&M-EST. PATIENT-LVL II: ICD-10-PCS | Mod: PBBFAC,,, | Performed by: INTERNAL MEDICINE

## 2019-05-16 PROCEDURE — 93005 ELECTROCARDIOGRAM TRACING: CPT | Mod: S$GLB,,, | Performed by: INTERNAL MEDICINE

## 2019-05-16 PROCEDURE — 99999 PR PBB SHADOW E&M-EST. PATIENT-LVL II: CPT | Mod: PBBFAC,,, | Performed by: INTERNAL MEDICINE

## 2019-05-16 PROCEDURE — 93005 EKG 12-LEAD: ICD-10-PCS | Mod: S$GLB,,, | Performed by: INTERNAL MEDICINE

## 2019-05-16 PROCEDURE — 93010 ELECTROCARDIOGRAM REPORT: CPT | Mod: S$GLB,,, | Performed by: INTERNAL MEDICINE

## 2019-05-16 PROCEDURE — 99205 OFFICE O/P NEW HI 60 MIN: CPT | Mod: S$GLB,,, | Performed by: INTERNAL MEDICINE

## 2019-05-16 PROCEDURE — 99205 PR OFFICE/OUTPT VISIT, NEW, LEVL V, 60-74 MIN: ICD-10-PCS | Mod: S$GLB,,, | Performed by: INTERNAL MEDICINE

## 2019-05-16 PROCEDURE — 93010 EKG 12-LEAD: ICD-10-PCS | Mod: S$GLB,,, | Performed by: INTERNAL MEDICINE

## 2019-05-16 NOTE — Clinical Note
Please see note for PM implant. Baljeet, I think he is a good candidate for his bundle pacing. If you disagree, implant the system you feel is best. Judith, can you set up the patient for implant with Dr Bob?Thanks, MB

## 2019-05-16 NOTE — PROGRESS NOTES
Subjective:    Patient ID:  Santos Morales is a 84 y.o. male who presents for evaluation of Atrial Fibrillation and Bradycardia      84 yoM here for PM consideration. He has chronic AF with bradycardia for the past few years. The history was taken with the assistance of his wife. He had normal LV function. He is on no cardioinhibitory agents. He denies syncope or near syncope. He is fatigued and has limited activity tolerance. He is on apixaban for CVA prophylaxis    Echo 11/18:  CONCLUSIONS     1 - Moderately enlarged aortic root.     2 - Biatrial enlargement.     3 - Concentric hypertrophy.     4 - No wall motion abnormalities.     5 - Normal left ventricular systolic function (EF 60-65%).     6 - Indeterminate LV diastolic function.     7 - Normal right ventricular systolic function .     8 - The estimated PA systolic pressure is 29 mmHg.     9 - Mild to moderate aortic regurgitation.     10 - Intermediate central venous pressure.     Past Medical History:  No date: Anxiety  No date: Atrial fibrillation  No date: Depression  No date: Hypertension  No date: Insomnia  No date: Mobitz type 1 second degree AV block      Comment:  followed by Dr. Hardy, Cardiology  No date: SAMUEL (obstructive sleep apnea)    Past Surgical History:  No date: BACK SURGERY  No date: CATARACT EXTRACTION W/  INTRAOCULAR LENS IMPLANT; Bilateral    Social History    Socioeconomic History      Marital status:       Spouse name: Not on file      Number of children: Not on file      Years of education: Not on file      Highest education level: Not on file    Occupational History      Not on file    Social Needs      Financial resource strain: Not on file      Food insecurity:        Worry: Not on file        Inability: Not on file      Transportation needs:        Medical: Not on file        Non-medical: Not on file    Tobacco Use      Smoking status: Never Smoker      Smokeless tobacco: Never Used    Substance and Sexual Activity       Alcohol use: Yes        Comment: socially       Drug use: No      Sexual activity: Not on file    Lifestyle      Physical activity:        Days per week: Not on file        Minutes per session: Not on file      Stress: Not on file    Relationships      Social connections:        Talks on phone: Not on file        Gets together: Not on file        Attends [employer] service: Not on file        Active member of club or organization: Not on file        Attends meetings of clubs or organizations: Not on file        Relationship status: Not on file    Other Topics      Concerns:        Not on file    Social History Narrative      Not on file      Review of patient's family history indicates:  Problem: Cancer      Relation: Father          Age of Onset: (Not Specified)          Comment: Stomach Ca   Problem: Diabetes      Relation: Paternal Grandmother          Age of Onset: (Not Specified)        Review of Systems   Constitution: Negative.   HENT: Negative.    Eyes: Negative.    Cardiovascular: Negative for chest pain, dyspnea on exertion, leg swelling, near-syncope, palpitations and syncope.   Respiratory: Negative.  Negative for shortness of breath.    Endocrine: Negative.    Hematologic/Lymphatic: Negative.    Skin: Negative.    Musculoskeletal: Negative.    Gastrointestinal: Negative.    Genitourinary: Negative.    Neurological: Negative.  Negative for dizziness and light-headedness.   Psychiatric/Behavioral: Negative.    Allergic/Immunologic: Negative.         Objective:    Physical Exam   Constitutional: He is oriented to person, place, and time. He appears well-developed and well-nourished. No distress.   HENT:   Head: Normocephalic and atraumatic.   Eyes: Pupils are equal, round, and reactive to light. EOM are normal.   Neck: Normal range of motion. No JVD present. No thyromegaly present.   Cardiovascular: Normal rate, regular rhythm, S1 normal, S2 normal and normal heart sounds. PMI is not displaced. Exam reveals  no gallop and no friction rub.   No murmur heard.  Pulmonary/Chest: Effort normal and breath sounds normal. No respiratory distress. He has no wheezes. He has no rales.   Abdominal: Soft. Bowel sounds are normal. He exhibits no distension. There is no tenderness. There is no rebound and no guarding.   Musculoskeletal: Normal range of motion. He exhibits no edema or tenderness.   Neurological: He is alert and oriented to person, place, and time. No cranial nerve deficit.   Skin: Skin is warm and dry. No rash noted. No erythema.   Psychiatric: He has a normal mood and affect. His behavior is normal. Judgment and thought content normal.   Vitals reviewed.    ECG: AF with junctional escape 38 bpm        Assessment:       1. Atrial fibrillation, persistent    2. Symptomatic bradycardia         Plan:       84 yoM AF with bradycardic response here for PM consideration. He has symptomatic bradycardia due to AF with slow ventricular response. He has a narrow escape rhythm. I believe that a His bundle pacing system would be best. The patient prefers implant at the [facility].  I will defer the implant to Dr Bob, the patient voiced understanding.

## 2019-05-17 DIAGNOSIS — I10 ESSENTIAL HYPERTENSION: ICD-10-CM

## 2019-05-17 DIAGNOSIS — I50.32 CHRONIC DIASTOLIC HEART FAILURE: ICD-10-CM

## 2019-05-17 RX ORDER — CANDESARTAN 8 MG/1
16 TABLET ORAL DAILY
Qty: 60 TABLET | Refills: 6 | Status: SHIPPED | OUTPATIENT
Start: 2019-05-17 | End: 2019-08-14 | Stop reason: SDUPTHER

## 2019-05-22 ENCOUNTER — HOSPITAL ENCOUNTER (EMERGENCY)
Facility: HOSPITAL | Age: 84
Discharge: HOME | End: 2019-05-22
Attending: EMERGENCY MEDICINE
Payer: COMMERCIAL

## 2019-05-22 VITALS
HEART RATE: 43 BPM | OXYGEN SATURATION: 97 % | HEIGHT: 68 IN | WEIGHT: 189.06 LBS | DIASTOLIC BLOOD PRESSURE: 76 MMHG | RESPIRATION RATE: 16 BRPM | TEMPERATURE: 98 F | SYSTOLIC BLOOD PRESSURE: 168 MMHG | BODY MASS INDEX: 28.65 KG/M2

## 2019-05-22 DIAGNOSIS — S81.811A LACERATION OF SKIN OF RIGHT LOWER LEG, INITIAL ENCOUNTER: Primary | ICD-10-CM
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
## 2019-05-22 PROCEDURE — 12005 RPR S/N/A/GEN/TRK12.6-20.0CM: CPT

## 2019-05-22 PROCEDURE — 99284 EMERGENCY DEPT VISIT MOD MDM: CPT | Mod: 25

## 2019-05-22 PROCEDURE — 25000003 PHARM REV CODE 250: Performed by: NURSE PRACTITIONER

## 2019-05-22 RX ORDER — HYDROCODONE BITARTRATE AND ACETAMINOPHEN 5; 325 MG/1; MG/1
1 TABLET ORAL
Status: COMPLETED | OUTPATIENT
Start: 2019-05-22 | End: 2019-05-22

## 2019-05-22 RX ORDER — LIDOCAINE HYDROCHLORIDE AND EPINEPHRINE 10; 10 MG/ML; UG/ML
10 INJECTION, SOLUTION INFILTRATION; PERINEURAL
Status: COMPLETED | OUTPATIENT
Start: 2019-05-22 | End: 2019-05-22

## 2019-05-22 RX ORDER — AMOXICILLIN AND CLAVULANATE POTASSIUM 875; 125 MG/1; MG/1
1 TABLET, FILM COATED ORAL
Status: COMPLETED | OUTPATIENT
Start: 2019-05-22 | End: 2019-05-22

## 2019-05-22 RX ORDER — HYDROCODONE BITARTRATE AND ACETAMINOPHEN 5; 325 MG/1; MG/1
1 TABLET ORAL EVERY 12 HOURS PRN
Qty: 8 TABLET | Refills: 0 | Status: SHIPPED | OUTPATIENT
Start: 2019-05-22 | End: 2019-07-03

## 2019-05-22 RX ORDER — AMOXICILLIN AND CLAVULANATE POTASSIUM 875; 125 MG/1; MG/1
1 TABLET, FILM COATED ORAL 2 TIMES DAILY
Qty: 14 TABLET | Refills: 0 | Status: SHIPPED | OUTPATIENT
Start: 2019-05-22 | End: 2019-07-03 | Stop reason: ALTCHOICE

## 2019-05-22 RX ORDER — MUPIROCIN 20 MG/G
OINTMENT TOPICAL 3 TIMES DAILY
Qty: 1 TUBE | Refills: 0 | Status: SHIPPED | OUTPATIENT
Start: 2019-05-22 | End: 2019-07-10 | Stop reason: SDUPTHER

## 2019-05-22 RX ADMIN — HYDROCODONE BITARTRATE AND ACETAMINOPHEN 1 TABLET: 5; 325 TABLET ORAL at 10:05

## 2019-05-22 RX ADMIN — AMOXICILLIN AND CLAVULANATE POTASSIUM 1 TABLET: 875; 125 TABLET, FILM COATED ORAL at 10:05

## 2019-05-22 RX ADMIN — LIDOCAINE HYDROCHLORIDE AND EPINEPHRINE 10 ML: 10; 10 INJECTION, SOLUTION INFILTRATION; PERINEURAL at 10:05

## 2019-05-23 DIAGNOSIS — F41.9 ANXIETY: ICD-10-CM

## 2019-05-23 RX ORDER — CLONAZEPAM 0.5 MG/1
TABLET ORAL
Qty: 30 TABLET | Refills: 0 | Status: SHIPPED | OUTPATIENT
Start: 2019-05-23 | End: 2019-05-30 | Stop reason: SDUPTHER

## 2019-05-23 NOTE — ED PROVIDER NOTES
SCRIBE #1 NOTE: I, Kristin Pope, am scribing for, and in the presence of, Isaac Lock NP. I have scribed the entire note.       History     Chief Complaint   Patient presents with    Laceration     Pt was scratched by dog to RLE. Actively bleeding deep laceration noted lateral lower extremity.      Review of patient's allergies indicates:  No Known Allergies      History of Present Illness     HPI    5/22/2019, 10:08 PM  History obtained from the patient      History of Present Illness: Santos Morales is a 84 y.o. male patient with a PMHx of HTN who presents to the Emergency Department for evaluation of a laceration to the RLE which onset suddenly tonight after being scratched by a dog. Symptoms are constant and moderate in severity. No mitigating or exacerbating factors reported. No associated sxs reported. Patient's denies any numbness, fever, chills, N/V, gait problem, weakness, and all other sxs at this time. Pt is on blood thinners. No prior tx reported. No further complaints or concerns at this time.         Arrival mode: Personal vehicle    PCP: Diane Bowen MD        Past Medical History:  Past Medical History:   Diagnosis Date    Anxiety     Atrial fibrillation     Depression     Hypertension     Insomnia     Mobitz type 1 second degree AV block     followed by Dr. Hardy, Cardiology    SAMUEL (obstructive sleep apnea)        Past Surgical History:  Past Surgical History:   Procedure Laterality Date    BACK SURGERY      CATARACT EXTRACTION W/  INTRAOCULAR LENS IMPLANT Bilateral          Family History:  Family History   Problem Relation Age of Onset    Cancer Father         Stomach Ca     Diabetes Paternal Grandmother        Social History:  Social History     Tobacco Use    Smoking status: Never Smoker    Smokeless tobacco: Never Used   Substance and Sexual Activity    Alcohol use: Yes     Comment: socially     Drug use: No    Sexual activity: Unknown         Review of Systems      Review of Systems   Constitutional: Negative for chills and fever.   HENT: Negative for congestion and sore throat.    Respiratory: Negative for shortness of breath.    Cardiovascular: Negative for chest pain.   Gastrointestinal: Negative for nausea and vomiting.   Genitourinary: Negative for dysuria and hematuria.   Musculoskeletal: Negative for back pain and gait problem.   Skin: Positive for wound (laceration to RLE). Negative for pallor and rash.   Neurological: Negative for dizziness, weakness and numbness.   Psychiatric/Behavioral: Negative for confusion.   All other systems reviewed and are negative.       Physical Exam     Initial Vitals [05/22/19 2143]   BP Pulse Resp Temp SpO2   (!) 168/76 (!) 43 16 97.5 °F (36.4 °C) 97 %      MAP       --          Physical Exam  Nursing Notes and Vital Signs Reviewed.  Constitutional: Patient is in no apparent distress. Well-developed and well-nourished.  Head: Atraumatic. Normocephalic.  Eyes: PERRL. EOM intact. Conjunctivae are not pale. No scleral icterus.  ENT: Mucous membranes are moist. Oropharynx is clear and symmetric.    Neck: Supple. Full ROM. No lymphadenopathy.  Cardiovascular: Regular rate. Regular rhythm. No murmurs, rubs, or gallops. Distal pulses are 2+ and symmetric.  Pulmonary/Chest: No respiratory distress. Clear to auscultation bilaterally. No wheezing or rales.  Abdominal: Soft and non-distended.  There is no tenderness.  No rebound, guarding, or rigidity. Good bowel sounds.  Genitourinary: No CVA tenderness  Musculoskeletal: Moves all extremities. No obvious deformities. No edema. No calf tenderness.  Skin: Warm and dry. 20 cm laceration to R lateral leg.   Neurological:  Alert, awake, and appropriate.  Normal speech.  No acute focal neurological deficits are appreciated.  Psychiatric: Normal affect. Good eye contact. Appropriate in content.     ED Course   Lac Repair  Date/Time: 5/22/2019 10:58 PM  Performed by: Isaac Lock NP  Authorized by:  "Isaac Lock NP   Body area: lower extremity  Laceration length: 20 cm  Foreign bodies: no foreign bodies    Anesthesia:  Local Anesthetic: lidocaine 1% with epinephrine  Anesthetic total: 10 mL  Patient sedated: no  Preparation: Patient was prepped and draped in the usual sterile fashion.  Irrigation solution: saline  Irrigation method: syringe  Amount of cleaning: extensive  Debridement: none  Degree of undermining: none  Skin closure: staples (20)  Number of sutures: 20  Approximation: close  Approximation difficulty: complex  Dressing: dressing applied  Patient tolerance: Patient tolerated the procedure well with no immediate complications        ED Vital Signs:  Vitals:    05/22/19 2143   BP: (!) 168/76   Pulse: (!) 43   Resp: 16   Temp: 97.5 °F (36.4 °C)   TempSrc: Oral   SpO2: 97%   Weight: 85.7 kg (189 lb 0.7 oz)   Height: 5' 8" (1.727 m)       Abnormal Lab Results:  Labs Reviewed - No data to display     All Lab Results:  None ordered.     Imaging Results:  Imaging Results    None                   The Emergency Provider reviewed the vital signs and test results, which are outlined above.     ED Discussion     11:05 PM: Reassessed pt at this time. Discussed with pt all pertinent ED information and results. Discussed pt dx and plan of tx. Gave pt all f/u and return to the ED instructions. All questions and concerns were addressed at this time. Pt expresses understanding of information and instructions, and is comfortable with plan to discharge. Pt is stable for discharge.    I discussed wound care precautions with patient and/or family/caretaker; specifically that all wounds have risk of infection despite efforts to cleanse and debride the wound; and there is a risk of an occult foreign body (and thus increased risk of infection) despite a negative examination.  I discussed with patient need to return for any signs of infection, specifically redness, increased pain, fever, drainage of pus, or any concern, " immediately.      ED Medication(s):  Medications   lidocaine-EPINEPHrine 1%-1:100,000 injection 10 mL (10 mLs Intradermal Given by Other 5/22/19 2229)   HYDROcodone-acetaminophen 5-325 mg per tablet 1 tablet (1 tablet Oral Given 5/22/19 2231)   amoxicillin-clavulanate 875-125mg per tablet 1 tablet (1 tablet Oral Given 5/22/19 2231)       Discharge Medication List as of 5/22/2019 11:04 PM      START taking these medications    Details   amoxicillin-clavulanate 875-125mg (AUGMENTIN) 875-125 mg per tablet Take 1 tablet by mouth 2 (two) times daily., Starting Wed 5/22/2019, Print      HYDROcodone-acetaminophen (NORCO) 5-325 mg per tablet Take 1 tablet by mouth every 12 (twelve) hours as needed for Pain., Starting Wed 5/22/2019, Print      mupirocin (BACTROBAN) 2 % ointment Apply topically 3 (three) times daily., Starting Wed 5/22/2019, Print             Follow-up Information     Diane Bowen MD In 3 days.    Specialty:  Family Medicine  Why:  For wound re-check  Contact information:  [address] [facility] [address] [phone]             [facility]    Specialty:  Emergency Medicine  Why:  For wound re-check, As needed, For suture removal  Contact information:  [id number] [place] [id number] [phone]           Diane Bowen MD In 2 weeks.    Specialty:  Family Medicine  Why:  For suture removal  Contact information:  [address] [facility] [address]  [phone]                                     Scribe Attestation:   Scribe #1: I performed the above scribed service and the documentation accurately describes the services I performed. I attest to the accuracy of the note.     Attending:   Physician Attestation Statement for Scribe #1: I, Isaac Lock NP, personally performed the services described in this documentation, as scribed by Kristin Pope, in my presence, and it is both accurate and complete.           Clinical  Impression       ICD-10-CM ICD-9-CM   1. Laceration of skin of right lower leg, initial encounter S81.811A 891.0   2. Dog scratch W54.8XXA 919.0     E906.8       Disposition:   Disposition: Discharged  Condition: Stable         Isaac Lock NP  05/23/19 5915

## 2019-05-24 ENCOUNTER — PATIENT MESSAGE (OUTPATIENT)
Dept: FAMILY MEDICINE | Facility: CLINIC | Age: 84
End: 2019-05-24

## 2019-05-28 DIAGNOSIS — F41.9 ANXIETY: ICD-10-CM

## 2019-05-28 RX ORDER — CLONAZEPAM 0.5 MG/1
TABLET ORAL
Qty: 30 TABLET | Refills: 2 | OUTPATIENT
Start: 2019-05-28

## 2019-05-30 ENCOUNTER — OFFICE VISIT (OUTPATIENT)
Dept: FAMILY MEDICINE | Facility: CLINIC | Age: 84
End: 2019-05-30
Attending: FAMILY MEDICINE
Payer: COMMERCIAL

## 2019-05-30 VITALS
WEIGHT: 189.5 LBS | SYSTOLIC BLOOD PRESSURE: 120 MMHG | OXYGEN SATURATION: 98 % | TEMPERATURE: 97 F | DIASTOLIC BLOOD PRESSURE: 52 MMHG | HEIGHT: 68 IN | BODY MASS INDEX: 28.72 KG/M2 | HEART RATE: 34 BPM
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
## 2019-05-30 DIAGNOSIS — F41.9 ANXIETY: ICD-10-CM

## 2019-05-30 DIAGNOSIS — I48.20 CHRONIC ATRIAL FIBRILLATION: ICD-10-CM

## 2019-05-30 PROCEDURE — 90471 TDAP VACCINE GREATER THAN OR EQUAL TO 7YO IM: ICD-10-PCS | Mod: S$GLB,,, | Performed by: FAMILY MEDICINE

## 2019-05-30 PROCEDURE — 90715 TDAP VACCINE GREATER THAN OR EQUAL TO 7YO IM: ICD-10-PCS | Mod: S$GLB,,, | Performed by: FAMILY MEDICINE

## 2019-05-30 PROCEDURE — 3078F PR MOST RECENT DIASTOLIC BLOOD PRESSURE < 80 MM HG: ICD-10-PCS | Mod: CPTII,S$GLB,, | Performed by: FAMILY MEDICINE

## 2019-05-30 PROCEDURE — 3074F SYST BP LT 130 MM HG: CPT | Mod: CPTII,S$GLB,, | Performed by: FAMILY MEDICINE

## 2019-05-30 PROCEDURE — 1101F PR PT FALLS ASSESS DOC 0-1 FALLS W/OUT INJ PAST YR: ICD-10-PCS | Mod: CPTII,S$GLB,, | Performed by: FAMILY MEDICINE

## 2019-05-30 PROCEDURE — 1101F PT FALLS ASSESS-DOCD LE1/YR: CPT | Mod: CPTII,S$GLB,, | Performed by: FAMILY MEDICINE

## 2019-05-30 PROCEDURE — 99214 PR OFFICE/OUTPT VISIT, EST, LEVL IV, 30-39 MIN: ICD-10-PCS | Mod: 25,S$GLB,, | Performed by: FAMILY MEDICINE

## 2019-05-30 PROCEDURE — 90715 TDAP VACCINE 7 YRS/> IM: CPT | Mod: S$GLB,,, | Performed by: FAMILY MEDICINE

## 2019-05-30 PROCEDURE — 3078F DIAST BP <80 MM HG: CPT | Mod: CPTII,S$GLB,, | Performed by: FAMILY MEDICINE

## 2019-05-30 PROCEDURE — 99214 OFFICE O/P EST MOD 30 MIN: CPT | Mod: 25,S$GLB,, | Performed by: FAMILY MEDICINE

## 2019-05-30 PROCEDURE — 99999 PR PBB SHADOW E&M-EST. PATIENT-LVL III: ICD-10-PCS | Mod: PBBFAC,,, | Performed by: FAMILY MEDICINE

## 2019-05-30 PROCEDURE — 90471 IMMUNIZATION ADMIN: CPT | Mod: S$GLB,,, | Performed by: FAMILY MEDICINE

## 2019-05-30 PROCEDURE — 99999 PR PBB SHADOW E&M-EST. PATIENT-LVL III: CPT | Mod: PBBFAC,,, | Performed by: FAMILY MEDICINE

## 2019-05-30 PROCEDURE — 3074F PR MOST RECENT SYSTOLIC BLOOD PRESSURE < 130 MM HG: ICD-10-PCS | Mod: CPTII,S$GLB,, | Performed by: FAMILY MEDICINE

## 2019-05-30 RX ORDER — SULFAMETHOXAZOLE AND TRIMETHOPRIM 800; 160 MG/1; MG/1
1 TABLET ORAL 2 TIMES DAILY
Qty: 20 TABLET | Refills: 0 | Status: SHIPPED | OUTPATIENT
Start: 2019-05-30 | End: 2019-07-03 | Stop reason: ALTCHOICE

## 2019-05-30 RX ORDER — CLONAZEPAM 0.5 MG/1
0.5 TABLET ORAL NIGHTLY
Qty: 30 TABLET | Refills: 2 | Status: SHIPPED | OUTPATIENT
Start: 2019-05-30 | End: 2019-08-31 | Stop reason: SDUPTHER

## 2019-05-30 NOTE — PROGRESS NOTES
Subjective:       Patient ID: Santos Morales is a 84 y.o. male.    Chief Complaint: Incisional Pain (redness/pain to right leg)    84 y old male with A fib on eliquis  here for f.u on ER visit  On 5/22 after being scratched by his dog which resulted on Large laceration on RLE . Had staples placed . Finished Augmentin today . Area is sightly erythematous . Not painful . Anxiety is fairly  controlled with Clonazepam     Review of Systems   Constitutional: Negative.    HENT: Negative.    Eyes: Negative.    Respiratory: Negative.    Cardiovascular: Negative.    Gastrointestinal: Negative.    Genitourinary: Negative.    Musculoskeletal: Negative.    Skin: Positive for wound.   Hematological: Negative.        Objective:      Physical Exam   Constitutional: He is oriented to person, place, and time. He appears well-developed and well-nourished. No distress.   HENT:   Head: Normocephalic and atraumatic.   Right Ear: External ear normal.   Left Ear: External ear normal.   Nose: Nose normal.   Mouth/Throat: No oropharyngeal exudate.   Eyes: Pupils are equal, round, and reactive to light. Conjunctivae and EOM are normal. Right eye exhibits no discharge. Left eye exhibits no discharge. No scleral icterus.   Neck: Normal range of motion. Neck supple. No JVD present. No tracheal deviation present. No thyromegaly present.   Cardiovascular: Normal rate, regular rhythm, normal heart sounds and intact distal pulses. Exam reveals no gallop and no friction rub.   No murmur heard.  Pulmonary/Chest: Effort normal and breath sounds normal. No stridor. No respiratory distress. He has no wheezes. He has no rales. He exhibits no tenderness.   Abdominal: Soft. Bowel sounds are normal. He exhibits no distension. There is no tenderness. There is no rebound and no guarding.   Musculoskeletal: Normal range of motion. He exhibits no edema or tenderness.   Lymphadenopathy:     He has no cervical adenopathy.   Neurological: He is alert and oriented  to person, place, and time. He has normal reflexes. He displays normal reflexes. No cranial nerve deficit. He exhibits normal muscle tone. Coordination normal.   Skin: Skin is warm and dry. Laceration noted. No rash noted. He is not diaphoretic. No erythema. No pallor.        Psychiatric: He has a normal mood and affect. His behavior is normal. Judgment and thought content normal.       Assessment:       1. Dog scratch    2. Anxiety    3. Chronic atrial fibrillation        Plan:     Santos was seen today for incisional pain.    Diagnoses and all orders for this visit:    Dog scratch    Anxiety  -     clonazePAM (KLONOPIN) 0.5 MG tablet; Take 1 tablet (0.5 mg total) by mouth every evening.    Chronic atrial fibrillation    Other orders  -     (In Office Administered) Tdap Vaccine  -     sulfamethoxazole-trimethoprim 800-160mg (BACTRIM DS) 800-160 mg Tab; Take 1 tablet by mouth 2 (two) times daily.     Bactrim . Wound care   Stable . Med rf   COnt eliquis

## 2019-06-05 ENCOUNTER — CLINICAL SUPPORT (OUTPATIENT)
Dept: FAMILY MEDICINE | Facility: CLINIC | Age: 84
End: 2019-06-05
Attending: FAMILY MEDICINE
Payer: COMMERCIAL

## 2019-06-05 RX ORDER — CANDESARTAN 8 MG/1
8 TABLET ORAL DAILY
Qty: 30 TABLET | Refills: 1 | OUTPATIENT
Start: 2019-06-05 | End: 2019-08-04

## 2019-06-05 NOTE — PROGRESS NOTES
Pt in the office for a nurse visit to get staples removed from his right lower leg. The incision was assessed per Dr. Maier before removing the staples. The incision site was clean, dry, and staples intact. No redness to the leg or incision site. Incision cleaned with iodine and 20 staples were removed from the pts lower outer R leg. Incision cleaned with saline and reinforced with steri strips. Pt tolerated well.

## 2019-06-21 ENCOUNTER — TELEPHONE (OUTPATIENT)
Dept: CARDIOLOGY | Facility: CLINIC | Age: 84
End: 2019-06-21

## 2019-06-21 NOTE — TELEPHONE ENCOUNTER
----- Message from Kevin Warren MD sent at 5/16/2019  3:09 PM CDT -----  Please see note for PM implant. Baljeet, I think he is a good candidate for his bundle pacing. If you disagree, implant the system you feel is best. Judith, can you set up the patient for implant with Dr Bob?    Thanks, MB

## 2019-06-28 DIAGNOSIS — I48.0 PAF (PAROXYSMAL ATRIAL FIBRILLATION): ICD-10-CM

## 2019-07-02 ENCOUNTER — PATIENT MESSAGE (OUTPATIENT)
Dept: CARDIOLOGY | Facility: CLINIC | Age: 84
End: 2019-07-02

## 2019-07-02 NOTE — TELEPHONE ENCOUNTER
Left voicemail message x 2 to return call to set up PPM.  Will send patient portal message with contact number also.

## 2019-07-03 ENCOUNTER — OFFICE VISIT (OUTPATIENT)
Dept: FAMILY MEDICINE | Facility: CLINIC | Age: 84
End: 2019-07-03
Attending: FAMILY MEDICINE
Payer: COMMERCIAL

## 2019-07-03 VITALS
DIASTOLIC BLOOD PRESSURE: 50 MMHG | WEIGHT: 185.06 LBS | HEART RATE: 45 BPM | BODY MASS INDEX: 28.05 KG/M2 | OXYGEN SATURATION: 95 % | HEIGHT: 68 IN | SYSTOLIC BLOOD PRESSURE: 130 MMHG | TEMPERATURE: 97 F

## 2019-07-03 DIAGNOSIS — N18.30 CKD (CHRONIC KIDNEY DISEASE) STAGE 3, GFR 30-59 ML/MIN: ICD-10-CM

## 2019-07-03 DIAGNOSIS — F32.1 CURRENT MODERATE EPISODE OF MAJOR DEPRESSIVE DISORDER WITHOUT PRIOR EPISODE: Primary | ICD-10-CM

## 2019-07-03 DIAGNOSIS — I48.20 CHRONIC ATRIAL FIBRILLATION: ICD-10-CM

## 2019-07-03 DIAGNOSIS — S81.801D WOUND OF RIGHT LOWER EXTREMITY, SUBSEQUENT ENCOUNTER: ICD-10-CM

## 2019-07-03 DIAGNOSIS — R00.1 SYMPTOMATIC BRADYCARDIA: ICD-10-CM

## 2019-07-03 PROBLEM — R79.89 ELEVATED BRAIN NATRIURETIC PEPTIDE (BNP) LEVEL: Status: RESOLVED | Noted: 2019-04-09 | Resolved: 2019-07-03

## 2019-07-03 PROCEDURE — 3078F DIAST BP <80 MM HG: CPT | Mod: CPTII,S$GLB,, | Performed by: FAMILY MEDICINE

## 2019-07-03 PROCEDURE — 3078F PR MOST RECENT DIASTOLIC BLOOD PRESSURE < 80 MM HG: ICD-10-PCS | Mod: CPTII,S$GLB,, | Performed by: FAMILY MEDICINE

## 2019-07-03 PROCEDURE — 99214 OFFICE O/P EST MOD 30 MIN: CPT | Mod: S$GLB,,, | Performed by: FAMILY MEDICINE

## 2019-07-03 PROCEDURE — 87077 CULTURE AEROBIC IDENTIFY: CPT

## 2019-07-03 PROCEDURE — 87070 CULTURE OTHR SPECIMN AEROBIC: CPT

## 2019-07-03 PROCEDURE — 99214 PR OFFICE/OUTPT VISIT, EST, LEVL IV, 30-39 MIN: ICD-10-PCS | Mod: S$GLB,,, | Performed by: FAMILY MEDICINE

## 2019-07-03 PROCEDURE — 99999 PR PBB SHADOW E&M-EST. PATIENT-LVL III: CPT | Mod: PBBFAC,,, | Performed by: FAMILY MEDICINE

## 2019-07-03 PROCEDURE — 3075F SYST BP GE 130 - 139MM HG: CPT | Mod: CPTII,S$GLB,, | Performed by: FAMILY MEDICINE

## 2019-07-03 PROCEDURE — 1101F PR PT FALLS ASSESS DOC 0-1 FALLS W/OUT INJ PAST YR: ICD-10-PCS | Mod: CPTII,S$GLB,, | Performed by: FAMILY MEDICINE

## 2019-07-03 PROCEDURE — 87186 SC STD MICRODIL/AGAR DIL: CPT

## 2019-07-03 PROCEDURE — 1101F PT FALLS ASSESS-DOCD LE1/YR: CPT | Mod: CPTII,S$GLB,, | Performed by: FAMILY MEDICINE

## 2019-07-03 PROCEDURE — 3075F PR MOST RECENT SYSTOLIC BLOOD PRESS GE 130-139MM HG: ICD-10-PCS | Mod: CPTII,S$GLB,, | Performed by: FAMILY MEDICINE

## 2019-07-03 PROCEDURE — 99999 PR PBB SHADOW E&M-EST. PATIENT-LVL III: ICD-10-PCS | Mod: PBBFAC,,, | Performed by: FAMILY MEDICINE

## 2019-07-03 RX ORDER — MUPIROCIN 20 MG/G
OINTMENT TOPICAL 3 TIMES DAILY
Qty: 22 G | Refills: 0 | Status: SHIPPED | OUTPATIENT
Start: 2019-07-03 | End: 2019-07-10 | Stop reason: SDUPTHER

## 2019-07-03 RX ORDER — SULFAMETHOXAZOLE AND TRIMETHOPRIM 800; 160 MG/1; MG/1
1 TABLET ORAL 2 TIMES DAILY
Qty: 14 TABLET | Refills: 0 | Status: SHIPPED | OUTPATIENT
Start: 2019-07-03 | End: 2019-07-10

## 2019-07-05 ENCOUNTER — TELEPHONE (OUTPATIENT)
Dept: FAMILY MEDICINE | Facility: CLINIC | Age: 84
End: 2019-07-05

## 2019-07-05 ENCOUNTER — PATIENT MESSAGE (OUTPATIENT)
Dept: FAMILY MEDICINE | Facility: CLINIC | Age: 84
End: 2019-07-05

## 2019-07-05 LAB — BACTERIA SPEC AEROBE CULT: ABNORMAL

## 2019-07-05 RX ORDER — AMOXICILLIN AND CLAVULANATE POTASSIUM 875; 125 MG/1; MG/1
1 TABLET, FILM COATED ORAL 2 TIMES DAILY
Qty: 14 TABLET | Refills: 0 | Status: SHIPPED | OUTPATIENT
Start: 2019-07-05 | End: 2019-12-09

## 2019-07-05 NOTE — TELEPHONE ENCOUNTER
----- Message from Baljeet Alarcon MD sent at 6/30/2019 11:21 PM CDT -----  Regarding: RE: PPM in June ??  Never followed up  ----- Message -----  From: Judith Mendoza LPN  Sent: 5/16/2019   3:25 PM  To: Baljeet Alarcon MD  Subject: PPM in June ??                                       ----- Message -----  From: Kevin Warren MD  Sent: 5/16/2019   3:09 PM  To: Judith Mendoza LPN    Please see note for PM implant. Baljeet, I think he is a good candidate for his bundle pacing. If you disagree, implant the system you feel is best. Judith, can you set up the patient for implant with Dr Bob?    Thanks, MB

## 2019-07-05 NOTE — TELEPHONE ENCOUNTER
Received call from MrsMir Carmen ( Rosa) and stated my  is not ready to have Pacemaker yet.  Advised to keep my phone number and please call when ready.

## 2019-07-10 ENCOUNTER — TELEPHONE (OUTPATIENT)
Dept: FAMILY MEDICINE | Facility: CLINIC | Age: 84
End: 2019-07-10

## 2019-07-10 ENCOUNTER — OFFICE VISIT (OUTPATIENT)
Dept: FAMILY MEDICINE | Facility: CLINIC | Age: 84
End: 2019-07-10
Attending: FAMILY MEDICINE
Payer: COMMERCIAL

## 2019-07-10 VITALS
WEIGHT: 187.25 LBS | OXYGEN SATURATION: 99 % | BODY MASS INDEX: 28.38 KG/M2 | HEIGHT: 68 IN | HEART RATE: 44 BPM | TEMPERATURE: 97 F | DIASTOLIC BLOOD PRESSURE: 64 MMHG | SYSTOLIC BLOOD PRESSURE: 150 MMHG

## 2019-07-10 DIAGNOSIS — S81.801D WOUND OF RIGHT LOWER EXTREMITY, SUBSEQUENT ENCOUNTER: Primary | ICD-10-CM

## 2019-07-10 PROCEDURE — 1101F PT FALLS ASSESS-DOCD LE1/YR: CPT | Mod: CPTII,S$GLB,, | Performed by: FAMILY MEDICINE

## 2019-07-10 PROCEDURE — 1101F PR PT FALLS ASSESS DOC 0-1 FALLS W/OUT INJ PAST YR: ICD-10-PCS | Mod: CPTII,S$GLB,, | Performed by: FAMILY MEDICINE

## 2019-07-10 PROCEDURE — 3078F DIAST BP <80 MM HG: CPT | Mod: CPTII,S$GLB,, | Performed by: FAMILY MEDICINE

## 2019-07-10 PROCEDURE — 99999 PR PBB SHADOW E&M-EST. PATIENT-LVL III: CPT | Mod: PBBFAC,,, | Performed by: FAMILY MEDICINE

## 2019-07-10 PROCEDURE — 3077F PR MOST RECENT SYSTOLIC BLOOD PRESSURE >= 140 MM HG: ICD-10-PCS | Mod: CPTII,S$GLB,, | Performed by: FAMILY MEDICINE

## 2019-07-10 PROCEDURE — 3078F PR MOST RECENT DIASTOLIC BLOOD PRESSURE < 80 MM HG: ICD-10-PCS | Mod: CPTII,S$GLB,, | Performed by: FAMILY MEDICINE

## 2019-07-10 PROCEDURE — 3077F SYST BP >= 140 MM HG: CPT | Mod: CPTII,S$GLB,, | Performed by: FAMILY MEDICINE

## 2019-07-10 PROCEDURE — 99999 PR PBB SHADOW E&M-EST. PATIENT-LVL III: ICD-10-PCS | Mod: PBBFAC,,, | Performed by: FAMILY MEDICINE

## 2019-07-10 PROCEDURE — 99213 PR OFFICE/OUTPT VISIT, EST, LEVL III, 20-29 MIN: ICD-10-PCS | Mod: S$GLB,,, | Performed by: FAMILY MEDICINE

## 2019-07-10 PROCEDURE — 99213 OFFICE O/P EST LOW 20 MIN: CPT | Mod: S$GLB,,, | Performed by: FAMILY MEDICINE

## 2019-07-10 RX ORDER — MUPIROCIN 20 MG/G
OINTMENT TOPICAL 3 TIMES DAILY
Qty: 22 G | Refills: 0 | Status: SHIPPED | OUTPATIENT
Start: 2019-07-10 | End: 2019-07-18 | Stop reason: SDUPTHER

## 2019-07-10 NOTE — PROGRESS NOTES
Subjective:       Patient ID: Santos Morales is a 84 y.o. male.    Chief Complaint: Follow-up    84 y old male here for f.u on  Wound infection on RLE. Wound cx grew e coli . Started on Augmentin . Wife is changing dressing twice daily . Much better , Keeping it covered.Less drainage     Review of Systems   Constitutional: Negative.    HENT: Negative.    Eyes: Negative.    Respiratory: Negative.    Cardiovascular: Negative.    Gastrointestinal: Negative.    Genitourinary: Negative.    Musculoskeletal: Negative.    Skin: Positive for wound. Negative for rash.   Hematological: Negative.        Objective:      Physical Exam   Constitutional: He is oriented to person, place, and time. No distress.   HENT:   Head: Normocephalic and atraumatic.   Right Ear: External ear normal.   Left Ear: External ear normal.   Nose: Nose normal.   Mouth/Throat: No oropharyngeal exudate.   Eyes: Pupils are equal, round, and reactive to light. Conjunctivae and EOM are normal. Right eye exhibits no discharge. Left eye exhibits no discharge. No scleral icterus.   Neck: Normal range of motion. Neck supple. No JVD present. No tracheal deviation present. No thyromegaly present.   Cardiovascular: Normal rate, regular rhythm, normal heart sounds and intact distal pulses. Exam reveals no gallop and no friction rub.   No murmur heard.  Pulmonary/Chest: Effort normal and breath sounds normal. No stridor. No respiratory distress. He has no wheezes. He has no rales. He exhibits no tenderness.   Abdominal: Soft. Bowel sounds are normal. He exhibits no distension. There is no tenderness. There is no rebound and no guarding.   Musculoskeletal: Normal range of motion. He exhibits no edema or tenderness.   Lymphadenopathy:     He has no cervical adenopathy.   Neurological: He is alert and oriented to person, place, and time. He has normal reflexes. He displays normal reflexes. No cranial nerve deficit. He exhibits normal muscle tone. Coordination normal.    Skin: Skin is warm and dry. Laceration noted. No rash noted. He is not diaphoretic. There is erythema. No pallor.        Psychiatric: He has a normal mood and affect. His behavior is normal. Judgment and thought content normal.       Assessment:       1. Wound of right lower extremity, subsequent encounter        Plan:     Santos was seen today for follow-up.    Diagnoses and all orders for this visit:    Wound of right lower extremity, subsequent encounter    Other orders  -     mupirocin (BACTROBAN) 2 % ointment; Apply topically 3 (three) times daily.    improving . Cont wound care   High BP . Will verify compliancy

## 2019-07-10 NOTE — TELEPHONE ENCOUNTER
Spoke with pt's wife, Rosa, informed her that pt's blood pressure was high at visit and if he took Candesartan and Aldactone today. She states that patient took all his medicine this morning including BP meds.

## 2019-07-10 NOTE — TELEPHONE ENCOUNTER
----- Message from Kenya Dumont sent at 7/10/2019  3:39 PM CDT -----  Type:  Needs Medical Advice    Who Called:  Pt wife  (Rosa)  Symptoms (please be specific):    How long has patient had these symptoms:   Pharmacy name and phone #:    Would the patient rather a call back or a response via [facility]?  Call back  Best Call Back Number:   [phone]  Additional Information:  States she just spoke with your office//is calling to speak with you again//denise/ron

## 2019-07-10 NOTE — TELEPHONE ENCOUNTER
Pt's wife called back and states that pt is taking two tablets of Candesartan daily and is not taking Aldactone. She called back to correct last message. She states that Dr. Lugo told him not to take Aldactone anymore.    ED Record Reviewed

## 2019-07-19 RX ORDER — MUPIROCIN 20 MG/G
OINTMENT TOPICAL
Qty: 22 G | Refills: 0 | Status: SHIPPED | OUTPATIENT
Start: 2019-07-19 | End: 2019-12-09

## 2019-08-14 ENCOUNTER — TELEPHONE (OUTPATIENT)
Dept: FAMILY MEDICINE | Facility: CLINIC | Age: 84
End: 2019-08-14

## 2019-08-14 DIAGNOSIS — F32.1 CURRENT MODERATE EPISODE OF MAJOR DEPRESSIVE DISORDER WITHOUT PRIOR EPISODE: Primary | ICD-10-CM

## 2019-08-14 DIAGNOSIS — I50.32 CHRONIC DIASTOLIC HEART FAILURE: ICD-10-CM

## 2019-08-14 DIAGNOSIS — R41.3 MEMORY LOSS: ICD-10-CM

## 2019-08-14 DIAGNOSIS — I10 ESSENTIAL HYPERTENSION: ICD-10-CM

## 2019-08-14 RX ORDER — CANDESARTAN 8 MG/1
16 TABLET ORAL DAILY
Qty: 60 TABLET | Refills: 6 | Status: SHIPPED | OUTPATIENT
Start: 2019-08-14 | End: 2019-08-30 | Stop reason: SDUPTHER

## 2019-08-14 NOTE — TELEPHONE ENCOUNTER
Spoke with pt's wife, Rosa, she was calling to get an outside referral placed for patient to see Neurologist, Dr. Jase Frey. She states that this doctor speaks Sinhala.

## 2019-08-14 NOTE — TELEPHONE ENCOUNTER
----- Message from Ada Robles sent at 8/14/2019  2:46 PM CDT -----  Contact: Pt  Pt is requesting call back in regards to outside referral for cardiology.        Pls call back at [phone]

## 2019-08-29 DIAGNOSIS — F51.04 CHRONIC INSOMNIA: ICD-10-CM

## 2019-08-29 RX ORDER — TRAZODONE HYDROCHLORIDE 150 MG/1
TABLET ORAL
Qty: 30 TABLET | Refills: 1 | Status: SHIPPED | OUTPATIENT
Start: 2019-08-29 | End: 2019-09-09 | Stop reason: SDUPTHER

## 2019-08-30 DIAGNOSIS — I50.32 CHRONIC DIASTOLIC HEART FAILURE: ICD-10-CM

## 2019-08-30 DIAGNOSIS — I10 ESSENTIAL HYPERTENSION: ICD-10-CM

## 2019-08-30 RX ORDER — CANDESARTAN 8 MG/1
16 TABLET ORAL DAILY
Qty: 180 TABLET | Refills: 3 | Status: SHIPPED | OUTPATIENT
Start: 2019-08-30 | End: 2020-05-30

## 2019-08-31 DIAGNOSIS — F41.9 ANXIETY: ICD-10-CM

## 2019-09-03 RX ORDER — SERTRALINE HYDROCHLORIDE 50 MG/1
TABLET, FILM COATED ORAL
Qty: 30 TABLET | Refills: 2 | Status: SHIPPED | OUTPATIENT
Start: 2019-09-03 | End: 2019-12-09 | Stop reason: SDUPTHER

## 2019-09-03 RX ORDER — CLONAZEPAM 0.5 MG/1
TABLET ORAL
Qty: 30 TABLET | Refills: 2 | Status: SHIPPED | OUTPATIENT
Start: 2019-09-03 | End: 2019-12-09 | Stop reason: SDUPTHER

## 2019-09-04 ENCOUNTER — TELEPHONE (OUTPATIENT)
Dept: FAMILY MEDICINE | Facility: CLINIC | Age: 84
End: 2019-09-04

## 2019-09-09 DIAGNOSIS — F51.04 CHRONIC INSOMNIA: ICD-10-CM

## 2019-09-09 RX ORDER — TRAZODONE HYDROCHLORIDE 150 MG/1
TABLET ORAL
Qty: 30 TABLET | Refills: 1 | Status: SHIPPED | OUTPATIENT
Start: 2019-09-09 | End: 2019-12-09 | Stop reason: SDUPTHER

## 2019-10-03 ENCOUNTER — TELEPHONE (OUTPATIENT)
Dept: FAMILY MEDICINE | Facility: CLINIC | Age: 84
End: 2019-10-03

## 2019-10-03 DIAGNOSIS — F03.90 DEMENTIA WITHOUT BEHAVIORAL DISTURBANCE, UNSPECIFIED DEMENTIA TYPE: Primary | ICD-10-CM

## 2019-10-03 NOTE — TELEPHONE ENCOUNTER
----- Message from Justina William sent at 10/3/2019 10:48 AM CDT -----  Contact: ken Lee  Type:  Patient Requesting Referral    Who Called:WIFE  Does the patient already have the specialty appointment scheduled?:NO  If yes, what is the date of that appointment?:-  Referral to What Specialty:-NEURO  Reason for Referral:MENTAL NERVE  Does the patient want the referral with a specific physician?: ROQUE GAFFNEY  Is the specialist an [facility]?:NON  Patient Requesting a Response?:YES  Would the patient rather a call back or a response via [facility]? CALL  Best Call Back Number:[phone]  Additional Information: PLEASE CALL TODAY ASA.

## 2019-10-03 NOTE — TELEPHONE ENCOUNTER
Spoke with pt's wife, she states that their office never contacted pt for an appointment. Advised her that referral was faxed on 8/14/19. She states she will contact them.

## 2019-10-22 DIAGNOSIS — I48.0 PAF (PAROXYSMAL ATRIAL FIBRILLATION): ICD-10-CM

## 2019-11-03 DIAGNOSIS — I10 ESSENTIAL HYPERTENSION: ICD-10-CM

## 2019-11-03 DIAGNOSIS — I50.32 CHRONIC DIASTOLIC HEART FAILURE: ICD-10-CM

## 2019-11-04 RX ORDER — SPIRONOLACTONE 25 MG/1
TABLET ORAL
Qty: 90 TABLET | Refills: 2 | Status: SHIPPED | OUTPATIENT
Start: 2019-11-04 | End: 2020-07-07

## 2019-12-06 DIAGNOSIS — F41.9 ANXIETY: ICD-10-CM

## 2019-12-09 ENCOUNTER — OFFICE VISIT (OUTPATIENT)
Dept: FAMILY MEDICINE | Facility: CLINIC | Age: 84
End: 2019-12-09
Attending: FAMILY MEDICINE
Payer: COMMERCIAL

## 2019-12-09 VITALS
HEART RATE: 53 BPM | DIASTOLIC BLOOD PRESSURE: 80 MMHG | TEMPERATURE: 97 F | OXYGEN SATURATION: 98 % | WEIGHT: 183.56 LBS | BODY MASS INDEX: 27.82 KG/M2 | SYSTOLIC BLOOD PRESSURE: 130 MMHG | HEIGHT: 68 IN

## 2019-12-09 DIAGNOSIS — N18.30 CKD (CHRONIC KIDNEY DISEASE) STAGE 3, GFR 30-59 ML/MIN: ICD-10-CM

## 2019-12-09 DIAGNOSIS — F32.9 MAJOR DEPRESSION, CHRONIC: ICD-10-CM

## 2019-12-09 DIAGNOSIS — I10 HYPERTENSION, UNSPECIFIED TYPE: ICD-10-CM

## 2019-12-09 DIAGNOSIS — F41.9 ANXIETY: ICD-10-CM

## 2019-12-09 DIAGNOSIS — R00.1 BRADYCARDIA: Primary | ICD-10-CM

## 2019-12-09 DIAGNOSIS — F51.04 CHRONIC INSOMNIA: ICD-10-CM

## 2019-12-09 PROCEDURE — 1126F PR PAIN SEVERITY QUANTIFIED, NO PAIN PRESENT: ICD-10-PCS | Mod: S$GLB,,, | Performed by: FAMILY MEDICINE

## 2019-12-09 PROCEDURE — 1126F AMNT PAIN NOTED NONE PRSNT: CPT | Mod: S$GLB,,, | Performed by: FAMILY MEDICINE

## 2019-12-09 PROCEDURE — 3075F SYST BP GE 130 - 139MM HG: CPT | Mod: CPTII,S$GLB,, | Performed by: FAMILY MEDICINE

## 2019-12-09 PROCEDURE — 3079F DIAST BP 80-89 MM HG: CPT | Mod: CPTII,S$GLB,, | Performed by: FAMILY MEDICINE

## 2019-12-09 PROCEDURE — 3288F FALL RISK ASSESSMENT DOCD: CPT | Mod: CPTII,S$GLB,, | Performed by: FAMILY MEDICINE

## 2019-12-09 PROCEDURE — 99999 PR PBB SHADOW E&M-EST. PATIENT-LVL III: CPT | Mod: PBBFAC,,, | Performed by: FAMILY MEDICINE

## 2019-12-09 PROCEDURE — 99999 PR PBB SHADOW E&M-EST. PATIENT-LVL III: ICD-10-PCS | Mod: PBBFAC,,, | Performed by: FAMILY MEDICINE

## 2019-12-09 PROCEDURE — 99214 PR OFFICE/OUTPT VISIT, EST, LEVL IV, 30-39 MIN: ICD-10-PCS | Mod: S$GLB,,, | Performed by: FAMILY MEDICINE

## 2019-12-09 PROCEDURE — 3288F PR FALLS RISK ASSESSMENT DOCUMENTED: ICD-10-PCS | Mod: CPTII,S$GLB,, | Performed by: FAMILY MEDICINE

## 2019-12-09 PROCEDURE — 1100F PTFALLS ASSESS-DOCD GE2>/YR: CPT | Mod: CPTII,S$GLB,, | Performed by: FAMILY MEDICINE

## 2019-12-09 PROCEDURE — 1159F PR MEDICATION LIST DOCUMENTED IN MEDICAL RECORD: ICD-10-PCS | Mod: S$GLB,,, | Performed by: FAMILY MEDICINE

## 2019-12-09 PROCEDURE — 3075F PR MOST RECENT SYSTOLIC BLOOD PRESS GE 130-139MM HG: ICD-10-PCS | Mod: CPTII,S$GLB,, | Performed by: FAMILY MEDICINE

## 2019-12-09 PROCEDURE — 3079F PR MOST RECENT DIASTOLIC BLOOD PRESSURE 80-89 MM HG: ICD-10-PCS | Mod: CPTII,S$GLB,, | Performed by: FAMILY MEDICINE

## 2019-12-09 PROCEDURE — 99214 OFFICE O/P EST MOD 30 MIN: CPT | Mod: S$GLB,,, | Performed by: FAMILY MEDICINE

## 2019-12-09 PROCEDURE — 1100F PR PT FALLS ASSESS DOC 2+ FALLS/FALL W/INJURY/YR: ICD-10-PCS | Mod: CPTII,S$GLB,, | Performed by: FAMILY MEDICINE

## 2019-12-09 PROCEDURE — 1159F MED LIST DOCD IN RCRD: CPT | Mod: S$GLB,,, | Performed by: FAMILY MEDICINE

## 2019-12-09 RX ORDER — CLONAZEPAM 0.5 MG/1
0.5 TABLET ORAL NIGHTLY
Qty: 30 TABLET | Refills: 2 | Status: SHIPPED | OUTPATIENT
Start: 2019-12-09 | End: 2020-02-27

## 2019-12-09 RX ORDER — CLONAZEPAM 0.5 MG/1
TABLET ORAL
Qty: 30 TABLET | Refills: 2 | OUTPATIENT
Start: 2019-12-09

## 2019-12-09 RX ORDER — SERTRALINE HYDROCHLORIDE 50 MG/1
TABLET, FILM COATED ORAL
Qty: 30 TABLET | Refills: 2 | Status: SHIPPED | OUTPATIENT
Start: 2019-12-09 | End: 2020-03-13

## 2019-12-09 RX ORDER — SERTRALINE HYDROCHLORIDE 100 MG/1
100 TABLET, FILM COATED ORAL DAILY
Qty: 90 TABLET | Refills: 0 | Status: SHIPPED | OUTPATIENT
Start: 2019-12-09 | End: 2020-04-06

## 2019-12-09 RX ORDER — TRAZODONE HYDROCHLORIDE 150 MG/1
TABLET ORAL
Qty: 30 TABLET | Refills: 1 | Status: ON HOLD | OUTPATIENT
Start: 2019-12-09 | End: 2020-02-23 | Stop reason: HOSPADM

## 2019-12-09 NOTE — PROGRESS NOTES
Subjective:       Patient ID: Santos Morales is a 84 y.o. male.    Chief Complaint: Follow-up    83 y old male with HTN, PAF, Bradycardia , Depression ,R rotator cuff tear ,  stage 3 ckd here for f.u . Not doing well . Still with insomnia, forgetfulness. Has left the house 2 times this y , Wife had to contact law enforcement. He gest confrontational upon questioning details. Will see Dr Tk walton m   . Denies sob , cp , leg edema . Had Holter placed on 12/19 monitor which showed bradycardia . He did not keep noa for CYNTHIA with cardioversion , also cancelled f.u noa with EP for PM  . Refused to take meds at times.+Doesn't want to f.u with ortho since steroid  injection did not help     Review of Systems   Constitutional: Negative.    HENT: Negative.    Eyes: Negative.    Respiratory: Negative.    Cardiovascular: Negative.    Gastrointestinal: Negative.    Genitourinary: Negative.    Musculoskeletal: Negative.    Skin: Negative.    Neurological: Negative for dizziness.   Hematological: Negative.    Psychiatric/Behavioral: Positive for sleep disturbance.       Objective:      Physical Exam   Constitutional: He is oriented to person, place, and time. No distress.   HENT:   Head: Normocephalic and atraumatic.   Right Ear: External ear normal.   Left Ear: External ear normal.   Nose: Nose normal.   Mouth/Throat: No oropharyngeal exudate.   Eyes: Pupils are equal, round, and reactive to light. Conjunctivae and EOM are normal. Right eye exhibits no discharge. Left eye exhibits no discharge. No scleral icterus.   Neck: Normal range of motion. Neck supple. No JVD present. No tracheal deviation present. No thyromegaly present.   Cardiovascular: Normal rate, regular rhythm, normal heart sounds and intact distal pulses. Exam reveals no gallop and no friction rub.   No murmur heard.  Pulmonary/Chest: Effort normal and breath sounds normal. No stridor. No respiratory distress. He has no wheezes. He has no rales. He exhibits no  tenderness.   Abdominal: Soft. Bowel sounds are normal. He exhibits no distension. There is no tenderness. There is no rebound and no guarding.   Musculoskeletal: Normal range of motion. He exhibits no edema or tenderness.   Lymphadenopathy:     He has no cervical adenopathy.   Neurological: He is alert and oriented to person, place, and time. He has normal reflexes. He displays normal reflexes. No cranial nerve deficit. He exhibits normal muscle tone. Coordination normal.   Skin: Skin is warm and dry. No rash noted. He is not diaphoretic. No erythema. No pallor.   Psychiatric: He has a normal mood and affect. His behavior is normal. Judgment and thought content normal.       Assessment:         Santos was seen today for follow-up.    Diagnoses and all orders for this visit:    Bradycardia  -     CBC auto differential; Future  -     Comprehensive metabolic panel; Future  -     Hemoglobin A1c; Future  -     Lipid panel; Future    Anxiety  -     clonazePAM (KLONOPIN) 0.5 MG tablet; Take 1 tablet (0.5 mg total) by mouth every evening.    Major depression, chronic  -     sertraline (ZOLOFT) 100 MG tablet; Take 1 tablet (100 mg total) by mouth once daily.    Chronic insomnia  -     traZODone (DESYREL) 150 MG tablet; TAKE ONE TABLET BY MOUTH ONCE DAILY IN THE EVENING    CKD (chronic kidney disease) stage 3, GFR 30-59 ml/min  -     CBC auto differential; Future  -     Comprehensive metabolic panel; Future  -     Hemoglobin A1c; Future  -     Lipid panel; Future    Hypertension, unspecified type    Other orders  -     sertraline (ZOLOFT) 50 MG tablet; TAKE 1 TABLET BY MOUTH ONCE DAILY FOR A TOTAL OF 150MG PER DAY.      Plan:     Santos was seen today for follow-up.    Diagnoses and all orders for this visit:    Anxiety  -     clonazePAM (KLONOPIN) 0.5 MG tablet; Take 1 tablet (0.5 mg total) by mouth every evening.    Major depression, chronic  -     sertraline (ZOLOFT) 100 MG tablet; Take 1 tablet (100 mg total) by  mouth once daily.    Chronic insomnia  -     traZODone (DESYREL) 150 MG tablet; TAKE ONE TABLET BY MOUTH ONCE DAILY IN THE EVENING    Other orders  -     sertraline (ZOLOFT) 50 MG tablet; TAKE 1 TABLET BY MOUTH ONCE DAILY FOR A TOTAL OF 150MG PER DAY.     f.u with card   Stable . Keep neuro noa   Avoid NSAID.   Controlled.cont med

## 2019-12-30 ENCOUNTER — LAB VISIT (OUTPATIENT)
Dept: LAB | Facility: HOSPITAL | Age: 84
End: 2019-12-30
Attending: FAMILY MEDICINE
Payer: COMMERCIAL

## 2019-12-30 DIAGNOSIS — N18.30 CKD (CHRONIC KIDNEY DISEASE) STAGE 3, GFR 30-59 ML/MIN: ICD-10-CM

## 2019-12-30 DIAGNOSIS — R00.1 BRADYCARDIA: ICD-10-CM

## 2019-12-30 LAB
ALBUMIN SERPL BCP-MCNC: 3.3 G/DL (ref 3.5–5.2)
ALP SERPL-CCNC: 95 U/L (ref 55–135)
ALT SERPL W/O P-5'-P-CCNC: 10 U/L (ref 10–44)
ANION GAP SERPL CALC-SCNC: 5 MMOL/L (ref 8–16)
AST SERPL-CCNC: 16 U/L (ref 10–40)
BASOPHILS # BLD AUTO: 0.04 K/UL (ref 0–0.2)
BASOPHILS NFR BLD: 0.6 % (ref 0–1.9)
BILIRUB SERPL-MCNC: 0.7 MG/DL (ref 0.1–1)
BUN SERPL-MCNC: 26 MG/DL (ref 8–23)
CALCIUM SERPL-MCNC: 9.6 MG/DL (ref 8.7–10.5)
CHLORIDE SERPL-SCNC: 106 MMOL/L (ref 95–110)
CHOLEST SERPL-MCNC: 117 MG/DL (ref 120–199)
CHOLEST/HDLC SERPL: 2.3 {RATIO} (ref 2–5)
CO2 SERPL-SCNC: 29 MMOL/L (ref 23–29)
CREAT SERPL-MCNC: 1.2 MG/DL (ref 0.5–1.4)
DIFFERENTIAL METHOD: ABNORMAL
EOSINOPHIL # BLD AUTO: 0.1 K/UL (ref 0–0.5)
EOSINOPHIL NFR BLD: 1.7 % (ref 0–8)
ERYTHROCYTE [DISTWIDTH] IN BLOOD BY AUTOMATED COUNT: 15.6 % (ref 11.5–14.5)
EST. GFR  (AFRICAN AMERICAN): >60 ML/MIN/1.73 M^2
EST. GFR  (NON AFRICAN AMERICAN): 55.2 ML/MIN/1.73 M^2
ESTIMATED AVG GLUCOSE: 111 MG/DL (ref 68–131)
GLUCOSE SERPL-MCNC: 94 MG/DL (ref 70–110)
HBA1C MFR BLD HPLC: 5.5 % (ref 4–5.6)
HCT VFR BLD AUTO: 38.9 % (ref 40–54)
HDLC SERPL-MCNC: 52 MG/DL (ref 40–75)
HDLC SERPL: 44.4 % (ref 20–50)
HGB BLD-MCNC: 12.2 G/DL (ref 14–18)
IMM GRANULOCYTES # BLD AUTO: 0.01 K/UL (ref 0–0.04)
IMM GRANULOCYTES NFR BLD AUTO: 0.2 % (ref 0–0.5)
LDLC SERPL CALC-MCNC: 56.8 MG/DL (ref 63–159)
LYMPHOCYTES # BLD AUTO: 1.4 K/UL (ref 1–4.8)
LYMPHOCYTES NFR BLD: 21.2 % (ref 18–48)
MCH RBC QN AUTO: 27.9 PG (ref 27–31)
MCHC RBC AUTO-ENTMCNC: 31.4 G/DL (ref 32–36)
MCV RBC AUTO: 89 FL (ref 82–98)
MONOCYTES # BLD AUTO: 0.6 K/UL (ref 0.3–1)
MONOCYTES NFR BLD: 8.5 % (ref 4–15)
NEUTROPHILS # BLD AUTO: 4.5 K/UL (ref 1.8–7.7)
NEUTROPHILS NFR BLD: 67.8 % (ref 38–73)
NONHDLC SERPL-MCNC: 65 MG/DL
NRBC BLD-RTO: 0 /100 WBC
PLATELET # BLD AUTO: 230 K/UL (ref 150–350)
PMV BLD AUTO: 11 FL (ref 9.2–12.9)
POTASSIUM SERPL-SCNC: 5.2 MMOL/L (ref 3.5–5.1)
PROT SERPL-MCNC: 6.7 G/DL (ref 6–8.4)
RBC # BLD AUTO: 4.37 M/UL (ref 4.6–6.2)
SODIUM SERPL-SCNC: 140 MMOL/L (ref 136–145)
TRIGL SERPL-MCNC: 41 MG/DL (ref 30–150)
WBC # BLD AUTO: 6.56 K/UL (ref 3.9–12.7)

## 2019-12-30 PROCEDURE — 80061 LIPID PANEL: CPT

## 2019-12-30 PROCEDURE — 83036 HEMOGLOBIN GLYCOSYLATED A1C: CPT

## 2019-12-30 PROCEDURE — 85025 COMPLETE CBC W/AUTO DIFF WBC: CPT

## 2019-12-30 PROCEDURE — 36415 COLL VENOUS BLD VENIPUNCTURE: CPT

## 2019-12-30 PROCEDURE — 80053 COMPREHEN METABOLIC PANEL: CPT

## 2020-01-02 ENCOUNTER — TELEPHONE (OUTPATIENT)
Dept: FAMILY MEDICINE | Facility: CLINIC | Age: 85
End: 2020-01-02

## 2020-01-02 DIAGNOSIS — D64.9 ANEMIA, UNSPECIFIED TYPE: Primary | ICD-10-CM

## 2020-01-02 DIAGNOSIS — E87.5 HYPERKALEMIA: ICD-10-CM

## 2020-01-02 NOTE — TELEPHONE ENCOUNTER
----- Message from Diane Bowen MD sent at 1/2/2020 12:42 PM CST -----  High k . Rep today . Ref to see hem in

## 2020-01-14 ENCOUNTER — OFFICE VISIT (OUTPATIENT)
Dept: CARDIOLOGY | Facility: CLINIC | Age: 85
End: 2020-01-14
Payer: COMMERCIAL

## 2020-01-14 VITALS
DIASTOLIC BLOOD PRESSURE: 52 MMHG | BODY MASS INDEX: 28.2 KG/M2 | HEIGHT: 68 IN | SYSTOLIC BLOOD PRESSURE: 168 MMHG | HEART RATE: 46 BPM | WEIGHT: 186.06 LBS | OXYGEN SATURATION: 95 %

## 2020-01-14 DIAGNOSIS — I35.1 NONRHEUMATIC AORTIC VALVE INSUFFICIENCY: Chronic | ICD-10-CM

## 2020-01-14 DIAGNOSIS — I50.32 CHRONIC DIASTOLIC HEART FAILURE: Primary | ICD-10-CM

## 2020-01-14 DIAGNOSIS — I48.19 ATRIAL FIBRILLATION, PERSISTENT: Chronic | ICD-10-CM

## 2020-01-14 DIAGNOSIS — I73.00 RAYNAUD'S DISEASE WITHOUT GANGRENE: Chronic | ICD-10-CM

## 2020-01-14 DIAGNOSIS — I10 ESSENTIAL HYPERTENSION: ICD-10-CM

## 2020-01-14 PROCEDURE — 99214 OFFICE O/P EST MOD 30 MIN: CPT | Mod: S$GLB,,, | Performed by: NUCLEAR MEDICINE

## 2020-01-14 PROCEDURE — 1100F PTFALLS ASSESS-DOCD GE2>/YR: CPT | Mod: CPTII,S$GLB,, | Performed by: NUCLEAR MEDICINE

## 2020-01-14 PROCEDURE — 3288F FALL RISK ASSESSMENT DOCD: CPT | Mod: CPTII,S$GLB,, | Performed by: NUCLEAR MEDICINE

## 2020-01-14 PROCEDURE — 1100F PR PT FALLS ASSESS DOC 2+ FALLS/FALL W/INJURY/YR: ICD-10-PCS | Mod: CPTII,S$GLB,, | Performed by: NUCLEAR MEDICINE

## 2020-01-14 PROCEDURE — 3078F DIAST BP <80 MM HG: CPT | Mod: CPTII,S$GLB,, | Performed by: NUCLEAR MEDICINE

## 2020-01-14 PROCEDURE — 99999 PR PBB SHADOW E&M-EST. PATIENT-LVL III: CPT | Mod: PBBFAC,,, | Performed by: NUCLEAR MEDICINE

## 2020-01-14 PROCEDURE — 3288F PR FALLS RISK ASSESSMENT DOCUMENTED: ICD-10-PCS | Mod: CPTII,S$GLB,, | Performed by: NUCLEAR MEDICINE

## 2020-01-14 PROCEDURE — 3078F PR MOST RECENT DIASTOLIC BLOOD PRESSURE < 80 MM HG: ICD-10-PCS | Mod: CPTII,S$GLB,, | Performed by: NUCLEAR MEDICINE

## 2020-01-14 PROCEDURE — 1159F PR MEDICATION LIST DOCUMENTED IN MEDICAL RECORD: ICD-10-PCS | Mod: S$GLB,,, | Performed by: NUCLEAR MEDICINE

## 2020-01-14 PROCEDURE — 3077F PR MOST RECENT SYSTOLIC BLOOD PRESSURE >= 140 MM HG: ICD-10-PCS | Mod: CPTII,S$GLB,, | Performed by: NUCLEAR MEDICINE

## 2020-01-14 PROCEDURE — 99214 PR OFFICE/OUTPT VISIT, EST, LEVL IV, 30-39 MIN: ICD-10-PCS | Mod: S$GLB,,, | Performed by: NUCLEAR MEDICINE

## 2020-01-14 PROCEDURE — 99999 PR PBB SHADOW E&M-EST. PATIENT-LVL III: ICD-10-PCS | Mod: PBBFAC,,, | Performed by: NUCLEAR MEDICINE

## 2020-01-14 PROCEDURE — 1159F MED LIST DOCD IN RCRD: CPT | Mod: S$GLB,,, | Performed by: NUCLEAR MEDICINE

## 2020-01-14 PROCEDURE — 3077F SYST BP >= 140 MM HG: CPT | Mod: CPTII,S$GLB,, | Performed by: NUCLEAR MEDICINE

## 2020-01-14 RX ORDER — ISOSORBIDE MONONITRATE 60 MG/1
60 TABLET, EXTENDED RELEASE ORAL DAILY
Qty: 30 TABLET | Refills: 6 | Status: SHIPPED | OUTPATIENT
Start: 2020-01-14 | End: 2020-05-30

## 2020-01-14 NOTE — PROGRESS NOTES
Subjective:   Patient ID:  Santos Morales is a 84 y.o. male who presents for follow-up of Consult (blue & cold hands); Atrial Fibrillation (CHRONIC PERSISTENT); Congestive Heart Failure (HFPEF-DD); Valvular Heart Disease (AR NON VALVULAR); and Hypertension (ESSENTIAL)      HPI WORSENING OF SENILE DEMENTIA  HAVING MANIFESTATIONS OF RAYNAUD SYNDROME IN BOTH HANDS.  OCCASIONAL NOCTURNAL CHEST PAIN  NO INCREASE ONEIL WITH ORDINARY DAILY ACTIVITIES. NO ORTHOPNEA OR PND  NO EXACERBATION OF PALPITATIONS  NO FOCAL CNS SYMPTOMS OR SIGNS TO SUGGEST TIA OR STROKE  CHRONIC EDEMA OF LE UNCHANGED  NO ABNORMAL BLEEDING    Review of Systems   Constitution: Negative for chills, fever, night sweats, weight gain and weight loss.   HENT: Negative for nosebleeds.    Eyes: Negative for blurred vision, double vision and visual disturbance.   Cardiovascular: Positive for leg swelling. Negative for chest pain, dyspnea on exertion, irregular heartbeat, orthopnea, palpitations, paroxysmal nocturnal dyspnea and syncope.   Respiratory: Negative for cough, hemoptysis and wheezing.    Endocrine: Negative for polydipsia and polyuria.   Hematologic/Lymphatic: Does not bruise/bleed easily.   Skin: Negative for rash.   Musculoskeletal: Negative for joint pain, joint swelling, muscle weakness and myalgias.   Gastrointestinal: Negative for abdominal pain, hematemesis, jaundice and melena.   Genitourinary: Negative for dysuria, hematuria and nocturia.   Neurological: Negative for dizziness, focal weakness, headaches, sensory change and weakness.   Psychiatric/Behavioral: Negative for depression. The patient does not have insomnia and is not nervous/anxious.      Family History   Problem Relation Age of Onset    Cancer Father         Stomach Ca     Diabetes Paternal Grandmother      Past Medical History:   Diagnosis Date    Anxiety     Atrial fibrillation     Depression     Hypertension     Insomnia     Mobitz type 1 second degree AV block      followed by Dr. Hardy, Cardiology    SAMUEL (obstructive sleep apnea)      Social History     Socioeconomic History    Marital status:      Spouse name: Not on file    Number of children: Not on file    Years of education: Not on file    Highest education level: Not on file   Occupational History    Not on file   Social Needs    Financial resource strain: Not on file    Food insecurity:     Worry: Not on file     Inability: Not on file    Transportation needs:     Medical: Not on file     Non-medical: Not on file   Tobacco Use    Smoking status: Never Smoker    Smokeless tobacco: Never Used   Substance and Sexual Activity    Alcohol use: Yes     Comment: socially     Drug use: No    Sexual activity: Not on file   Lifestyle    Physical activity:     Days per week: Not on file     Minutes per session: Not on file    Stress: Not on file   Relationships    Social connections:     Talks on phone: Not on file     Gets together: Not on file     Attends [employer] witness service: Not on file     Active member of club or organization: Not on file     Attends meetings of clubs or organizations: Not on file     Relationship status: Not on file   Other Topics Concern    Not on file   Social History Narrative    Not on file     Current Outpatient Medications on File Prior to Visit   Medication Sig Dispense Refill    apixaban (ELIQUIS) 5 mg Tab Take 1 tablet (5 mg total) by mouth 2 (two) times daily. 60 tablet 3    candesartan (ATACAND) 8 MG tablet Take 2 tablets (16 mg total) by mouth once daily. 180 tablet 3    clonazePAM (KLONOPIN) 0.5 MG tablet Take 1 tablet (0.5 mg total) by mouth every evening. 30 tablet 2    furosemide (LASIX) 40 MG tablet Take 1 tablet (40 mg total) by mouth daily as needed. Take next 3 days 90 tablet 3    melatonin 1 mg Tab Take 1 tablet by mouth every evening.      sertraline (ZOLOFT) 100 MG tablet Take 1 tablet (100 mg total) by mouth once daily. 90 tablet 0    sertraline  (ZOLOFT) 50 MG tablet TAKE 1 TABLET BY MOUTH ONCE DAILY FOR A TOTAL OF 150MG PER DAY. 30 tablet 2    spironolactone (ALDACTONE) 25 MG tablet TAKE 1 TABLET BY MOUTH ONCE DAILY 90 tablet 2    traZODone (DESYREL) 150 MG tablet TAKE ONE TABLET BY MOUTH ONCE DAILY IN THE EVENING 30 tablet 1     No current facility-administered medications on file prior to visit.      Review of patient's allergies indicates:  No Known Allergies    Objective:     Physical Exam   Constitutional: He is oriented to person, place, and time. He appears well-developed. No distress.   HENT:   Head: Normocephalic.   Eyes: Pupils are equal, round, and reactive to light. Conjunctivae are normal.   Neck: Neck supple. No JVD present. No thyromegaly present.   Cardiovascular: Normal rate and intact distal pulses. An irregularly irregular rhythm present. Exam reveals no gallop and no friction rub.   Murmur heard.   Medium-pitched blowing decrescendo diastolic murmur is present with a grade of 3/6 at the upper left sternal border and lower left sternal border. The intensity increases with respiration.  Pulses:       Carotid pulses are 2+ on the right side, and 2+ on the left side.       Radial pulses are 2+ on the right side, and 2+ on the left side.        Femoral pulses are 2+ on the right side, and 2+ on the left side.       Popliteal pulses are 2+ on the right side, and 2+ on the left side.        Dorsalis pedis pulses are 2+ on the right side, and 2+ on the left side.        Posterior tibial pulses are 2+ on the right side, and 2+ on the left side.   Pulmonary/Chest: Breath sounds normal. He has no wheezes. He has no rales. He exhibits no tenderness.   Abdominal: Soft. Bowel sounds are normal. He exhibits no mass. There is no hepatosplenomegaly. There is no tenderness.   Musculoskeletal: He exhibits edema. He exhibits no tenderness.        Cervical back: Normal.        Thoracic back: Normal.        Lumbar back: Normal.   Lymphadenopathy:     He  has no cervical adenopathy.     He has no axillary adenopathy.        Right: No supraclavicular adenopathy present.        Left: No supraclavicular adenopathy present.   Neurological: He is alert and oriented to person, place, and time. He has normal strength. No sensory deficit. Gait normal.   Skin: Skin is warm. No cyanosis. No pallor. Nails show no clubbing.   Psychiatric: He has a normal mood and affect. His speech is normal and behavior is normal. Cognition and memory are normal.       Assessment:     1. Chronic diastolic heart failure    2. Atrial fibrillation, persistent    3. Essential hypertension    4. Nonrheumatic aortic valve insufficiency    5. Raynaud's disease without gangrene        Plan:     Chronic diastolic heart failure  CLINICALLY COMPENSATED    Atrial fibrillation, persistent  RATE CONTROLLED  CNS STATUS- WORSENING OF SENILE DEMENTIA  STILL REFUSING PPM    Essential hypertension  BP WELL CONTROLLED    Nonrheumatic aortic valve insufficiency  ASYMPTOMATIC    Raynaud's disease without gangrene  START ORAL LONG ACTING NITRATES    Other orders  -     isosorbide mononitrate (IMDUR) 60 MG 24 hr tablet; Take 1 tablet (60 mg total) by mouth once daily.  Dispense: 30 tablet; Refill: 6    RETURN IN ONE YEAR

## 2020-02-11 ENCOUNTER — TELEPHONE (OUTPATIENT)
Dept: FAMILY MEDICINE | Facility: CLINIC | Age: 85
End: 2020-02-11

## 2020-02-11 NOTE — TELEPHONE ENCOUNTER
----- Message from Andra Laguna sent at 2/11/2020  9:43 AM CST -----  Contact: Rosa-wife  Would like to consult with nurse regarding pt, will not give any additional information. Please give a call back at [phone].            Thanks,  Andra NORIEGA

## 2020-02-11 NOTE — TELEPHONE ENCOUNTER
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 Does pt need an appointment? Please advise.

## 2020-02-11 NOTE — TELEPHONE ENCOUNTER
Pts wife states he never saw Neuro (Dr. Frey) because he did not want to go see Neuro. Pts wife asking if she needs to call and schedule an appt for Neuro. Please advise.

## 2020-02-11 NOTE — TELEPHONE ENCOUNTER
Spoke with pt's wife, Rosa, she states that pt has appointment with Dr. Frey on 3/12/20. Referral from 10/3/19 faxed to [facility].

## 2020-02-19 ENCOUNTER — HOSPITAL ENCOUNTER (INPATIENT)
Facility: HOSPITAL | Age: 85
LOS: 2 days | Discharge: HOME HEALTH | DRG: 178 | End: 2020-02-23
Attending: EMERGENCY MEDICINE | Admitting: INTERNAL MEDICINE
Payer: MEDICARE

## 2020-02-19 DIAGNOSIS — S32.010A COMPRESSION FRACTURE OF L1 VERTEBRA, INITIAL ENCOUNTER: ICD-10-CM

## 2020-02-19 DIAGNOSIS — R00.1 BRADYCARDIA: ICD-10-CM

## 2020-02-19 DIAGNOSIS — S22.31XA CLOSED FRACTURE OF ONE RIB OF RIGHT SIDE, INITIAL ENCOUNTER: ICD-10-CM

## 2020-02-19 DIAGNOSIS — J86.9 EMPYEMA, LEFT: Primary | ICD-10-CM

## 2020-02-19 DIAGNOSIS — I10 ESSENTIAL HYPERTENSION: ICD-10-CM
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
## 2020-02-19 DIAGNOSIS — J81.1 PULMONARY EDEMA: ICD-10-CM

## 2020-02-19 LAB
ALBUMIN SERPL BCP-MCNC: 3 G/DL (ref 3.5–5.2)
ALP SERPL-CCNC: 177 U/L (ref 55–135)
ALT SERPL W/O P-5'-P-CCNC: 15 U/L (ref 10–44)
ANION GAP SERPL CALC-SCNC: 5 MMOL/L (ref 8–16)
AST SERPL-CCNC: 17 U/L (ref 10–40)
BASOPHILS # BLD AUTO: 0.03 K/UL (ref 0–0.2)
BASOPHILS NFR BLD: 0.5 % (ref 0–1.9)
BILIRUB SERPL-MCNC: 0.4 MG/DL (ref 0.1–1)
BNP SERPL-MCNC: 1542 PG/ML (ref 0–99)
BUN SERPL-MCNC: 33 MG/DL (ref 8–23)
CALCIUM SERPL-MCNC: 9.1 MG/DL (ref 8.7–10.5)
CHLORIDE SERPL-SCNC: 107 MMOL/L (ref 95–110)
CO2 SERPL-SCNC: 27 MMOL/L (ref 23–29)
CREAT SERPL-MCNC: 1.5 MG/DL (ref 0.5–1.4)
DIFFERENTIAL METHOD: ABNORMAL
EOSINOPHIL # BLD AUTO: 0.1 K/UL (ref 0–0.5)
EOSINOPHIL NFR BLD: 1.7 % (ref 0–8)
ERYTHROCYTE [DISTWIDTH] IN BLOOD BY AUTOMATED COUNT: 15.2 % (ref 11.5–14.5)
EST. GFR  (AFRICAN AMERICAN): 49 ML/MIN/1.73 M^2
EST. GFR  (NON AFRICAN AMERICAN): 42 ML/MIN/1.73 M^2
GLUCOSE SERPL-MCNC: 109 MG/DL (ref 70–110)
HCT VFR BLD AUTO: 34.5 % (ref 40–54)
HGB BLD-MCNC: 10.9 G/DL (ref 14–18)
IMM GRANULOCYTES # BLD AUTO: 0.02 K/UL (ref 0–0.04)
IMM GRANULOCYTES NFR BLD AUTO: 0.3 % (ref 0–0.5)
LACTATE SERPL-SCNC: 0.9 MMOL/L (ref 0.5–2.2)
LYMPHOCYTES # BLD AUTO: 1.2 K/UL (ref 1–4.8)
LYMPHOCYTES NFR BLD: 18 % (ref 18–48)
MCH RBC QN AUTO: 26.5 PG (ref 27–31)
MCHC RBC AUTO-ENTMCNC: 31.6 G/DL (ref 32–36)
MCV RBC AUTO: 84 FL (ref 82–98)
MONOCYTES # BLD AUTO: 0.5 K/UL (ref 0.3–1)
MONOCYTES NFR BLD: 7 % (ref 4–15)
NEUTROPHILS # BLD AUTO: 4.8 K/UL (ref 1.8–7.7)
NEUTROPHILS NFR BLD: 72.5 % (ref 38–73)
NRBC BLD-RTO: 0 /100 WBC
PLATELET # BLD AUTO: 252 K/UL (ref 150–350)
PMV BLD AUTO: 10.4 FL (ref 9.2–12.9)
POTASSIUM SERPL-SCNC: 4.7 MMOL/L (ref 3.5–5.1)
PROT SERPL-MCNC: 6.6 G/DL (ref 6–8.4)
RBC # BLD AUTO: 4.12 M/UL (ref 4.6–6.2)
SODIUM SERPL-SCNC: 139 MMOL/L (ref 136–145)
WBC # BLD AUTO: 6.6 K/UL (ref 3.9–12.7)

## 2020-02-19 PROCEDURE — 83605 ASSAY OF LACTIC ACID: CPT

## 2020-02-19 PROCEDURE — 80053 COMPREHEN METABOLIC PANEL: CPT

## 2020-02-19 PROCEDURE — 85025 COMPLETE CBC W/AUTO DIFF WBC: CPT

## 2020-02-19 PROCEDURE — 99285 EMERGENCY DEPT VISIT HI MDM: CPT | Mod: 25

## 2020-02-19 PROCEDURE — 83880 ASSAY OF NATRIURETIC PEPTIDE: CPT

## 2020-02-20 ENCOUNTER — TELEPHONE (OUTPATIENT)
Dept: PULMONOLOGY | Facility: HOSPITAL | Age: 85
End: 2020-02-20

## 2020-02-20 PROBLEM — J86.9 EMPYEMA, LEFT: Status: ACTIVE | Noted: 2020-02-20

## 2020-02-20 PROBLEM — J90 LOCULATED PLEURAL EFFUSION: Status: ACTIVE | Noted: 2020-02-20

## 2020-02-20 PROBLEM — I50.9 CHRONIC CONGESTIVE HEART FAILURE: Status: ACTIVE | Noted: 2020-02-20

## 2020-02-20 PROCEDURE — G0378 HOSPITAL OBSERVATION PER HR: HCPCS

## 2020-02-20 PROCEDURE — 99204 OFFICE O/P NEW MOD 45 MIN: CPT | Mod: ,,, | Performed by: INTERNAL MEDICINE

## 2020-02-20 PROCEDURE — 99223 1ST HOSP IP/OBS HIGH 75: CPT | Mod: ICN,,, | Performed by: PHYSICIAN ASSISTANT

## 2020-02-20 PROCEDURE — 25000003 PHARM REV CODE 250: Performed by: EMERGENCY MEDICINE

## 2020-02-20 PROCEDURE — 25000003 PHARM REV CODE 250: Performed by: NURSE PRACTITIONER

## 2020-02-20 PROCEDURE — 99204 PR OFFICE/OUTPT VISIT, NEW, LEVL IV, 45-59 MIN: ICD-10-PCS | Mod: ,,, | Performed by: INTERNAL MEDICINE

## 2020-02-20 PROCEDURE — 99497 PR ADVNCD CARE PLAN 30 MIN: ICD-10-PCS | Mod: 25,ICN,, | Performed by: PHYSICIAN ASSISTANT

## 2020-02-20 PROCEDURE — 63600175 PHARM REV CODE 636 W HCPCS: Performed by: EMERGENCY MEDICINE

## 2020-02-20 PROCEDURE — 99497 ADVNCD CARE PLAN 30 MIN: CPT | Mod: 25,ICN,, | Performed by: PHYSICIAN ASSISTANT

## 2020-02-20 PROCEDURE — 99223 PR INITIAL HOSPITAL CARE,LEVL III: ICD-10-PCS | Mod: ICN,,, | Performed by: PHYSICIAN ASSISTANT

## 2020-02-20 RX ORDER — HYDRALAZINE HYDROCHLORIDE 20 MG/ML
10 INJECTION INTRAMUSCULAR; INTRAVENOUS EVERY 8 HOURS PRN
Status: DISCONTINUED | OUTPATIENT
Start: 2020-02-20 | End: 2020-02-23 | Stop reason: HOSPADM

## 2020-02-20 RX ORDER — MORPHINE SULFATE 4 MG/ML
4 INJECTION, SOLUTION INTRAMUSCULAR; INTRAVENOUS
Status: COMPLETED | OUTPATIENT
Start: 2020-02-20 | End: 2020-02-20

## 2020-02-20 RX ORDER — ACETAMINOPHEN 325 MG/1
650 TABLET ORAL
Status: COMPLETED | OUTPATIENT
Start: 2020-02-20 | End: 2020-02-20

## 2020-02-20 RX ORDER — ACETAMINOPHEN 325 MG/1
650 TABLET ORAL EVERY 6 HOURS PRN
Status: DISCONTINUED | OUTPATIENT
Start: 2020-02-20 | End: 2020-02-23 | Stop reason: HOSPADM

## 2020-02-20 RX ORDER — ONDANSETRON 2 MG/ML
4 INJECTION INTRAMUSCULAR; INTRAVENOUS EVERY 8 HOURS PRN
Status: DISCONTINUED | OUTPATIENT
Start: 2020-02-20 | End: 2020-02-23 | Stop reason: HOSPADM

## 2020-02-20 RX ORDER — MAG HYDROX/ALUMINUM HYD/SIMETH 200-200-20
30 SUSPENSION, ORAL (FINAL DOSE FORM) ORAL EVERY 6 HOURS PRN
Status: DISCONTINUED | OUTPATIENT
Start: 2020-02-20 | End: 2020-02-23 | Stop reason: HOSPADM

## 2020-02-20 RX ORDER — MORPHINE SULFATE 4 MG/ML
2 INJECTION, SOLUTION INTRAMUSCULAR; INTRAVENOUS EVERY 4 HOURS PRN
Status: DISCONTINUED | OUTPATIENT
Start: 2020-02-20 | End: 2020-02-23 | Stop reason: HOSPADM

## 2020-02-20 RX ORDER — GUAIFENESIN 100 MG/5ML
200 SOLUTION ORAL EVERY 4 HOURS PRN
Status: DISCONTINUED | OUTPATIENT
Start: 2020-02-20 | End: 2020-02-23 | Stop reason: HOSPADM

## 2020-02-20 RX ORDER — IPRATROPIUM BROMIDE AND ALBUTEROL SULFATE 2.5; .5 MG/3ML; MG/3ML
3 SOLUTION RESPIRATORY (INHALATION) EVERY 4 HOURS PRN
Status: DISCONTINUED | OUTPATIENT
Start: 2020-02-20 | End: 2020-02-23 | Stop reason: HOSPADM

## 2020-02-20 RX ADMIN — MORPHINE SULFATE 4 MG: 4 INJECTION, SOLUTION INTRAMUSCULAR; INTRAVENOUS at 01:02

## 2020-02-20 RX ADMIN — TRAZODONE HYDROCHLORIDE 150 MG: 100 TABLET ORAL at 09:02

## 2020-02-20 RX ADMIN — ACETAMINOPHEN 650 MG: 325 TABLET ORAL at 02:02

## 2020-02-20 NOTE — HPI
84-year-old male patient, Latvian speaking, used to work as a , no history of smoking, fell at home, complaining of pleuritic left-sided chest pain, nephew's wife in the room.  Tele  used.  Known with history of bradycardia, AFib with slow ventricular response anticoagulated on apixaban, as per patient wife home and called on the phone, she stated the patient took Eliquis yesterday evening.  He and his wife have refused pacemaker in the past.

## 2020-02-20 NOTE — ED NOTES
Wife asked RN if she could give her  the pt his nightly medications that he takes, Rn instructed she would ask Dr. Blood. Dr. Blood reported YES, allow pt to take daily nightly medications at this time. RN instructed pt wife that Dr. Blood stated that he could take his medications.

## 2020-02-20 NOTE — ED NOTES
Pt readjusted in the bed in a comfortable position. Wife at bedside.   Pt lying in bed in NAD,VSS,RR equal and unlabored. Bed is low, locked, and call light in reach. Side rails up x 2.
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
## 2020-02-20 NOTE — ED NOTES
Pt lying in bed in NAD,VSS,RR equal and unlabored. Bed is low, locked, and call light in reach. Side rails up x 2.  Wife at bedside. Pt is resting with his eyes closed, aroused when spoken to.

## 2020-02-20 NOTE — SUBJECTIVE & OBJECTIVE
Past Medical History:   Diagnosis Date    Anxiety     Atrial fibrillation     Dementia 02/19/2020    Depression     Hypertension     Insomnia     Mobitz type 1 second degree AV block     followed by Dr. Hardy, Cardiology    SAMUEL (obstructive sleep apnea)        Past Surgical History:   Procedure Laterality Date    BACK SURGERY      CATARACT EXTRACTION W/  INTRAOCULAR LENS IMPLANT Bilateral        Review of patient's allergies indicates:  No Known Allergies    No current facility-administered medications on file prior to encounter.      Current Outpatient Medications on File Prior to Encounter   Medication Sig    apixaban (ELIQUIS) 5 mg Tab Take 1 tablet (5 mg total) by mouth 2 (two) times daily.    clonazePAM (KLONOPIN) 0.5 MG tablet Take 1 tablet (0.5 mg total) by mouth every evening.    isosorbide mononitrate (IMDUR) 60 MG 24 hr tablet Take 1 tablet (60 mg total) by mouth once daily.    melatonin 1 mg Tab Take 1 tablet by mouth every evening.    sertraline (ZOLOFT) 100 MG tablet Take 1 tablet (100 mg total) by mouth once daily.    sertraline (ZOLOFT) 50 MG tablet TAKE 1 TABLET BY MOUTH ONCE DAILY FOR A TOTAL OF 150MG PER DAY.    spironolactone (ALDACTONE) 25 MG tablet TAKE 1 TABLET BY MOUTH ONCE DAILY    traZODone (DESYREL) 150 MG tablet TAKE ONE TABLET BY MOUTH ONCE DAILY IN THE EVENING    candesartan (ATACAND) 8 MG tablet Take 2 tablets (16 mg total) by mouth once daily.    furosemide (LASIX) 40 MG tablet Take 1 tablet (40 mg total) by mouth daily as needed. Take next 3 days     Family History     Problem Relation (Age of Onset)    Cancer Father    Diabetes Paternal Grandmother        Tobacco Use    Smoking status: Never Smoker    Smokeless tobacco: Never Used   Substance and Sexual Activity    Alcohol use: Yes     Comment: socially     Drug use: No    Sexual activity: Not on file     Review of Systems   Constitutional: Negative.  Negative for appetite change, fatigue and fever.   HENT:  Negative.  Negative for congestion, nosebleeds and sore throat.    Eyes: Negative.  Negative for visual disturbance.   Respiratory: Positive for shortness of breath. Negative for cough and wheezing.    Cardiovascular: Negative.  Negative for chest pain, palpitations and leg swelling.   Gastrointestinal: Negative.  Negative for abdominal pain, constipation, diarrhea, nausea and vomiting.   Endocrine: Negative.  Negative for polyuria.   Genitourinary: Negative.  Negative for dysuria, flank pain, frequency and urgency.   Musculoskeletal: Negative.  Negative for arthralgias, back pain and joint swelling.   Skin: Negative.  Negative for color change, pallor and rash.   Allergic/Immunologic: Negative.  Negative for immunocompromised state.   Neurological: Negative.  Negative for dizziness, syncope, weakness, light-headedness, numbness and headaches.   Hematological: Negative.    Psychiatric/Behavioral: Positive for decreased concentration. Negative for confusion and hallucinations. The patient is not nervous/anxious.    All other systems reviewed and are negative.    Objective:     Vital Signs (Most Recent):  Temp: 98.2 °F (36.8 °C) (02/19/20 2320)  Pulse: (!) 40 (02/20/20 0602)  Resp: 20 (02/20/20 0602)  BP: (!) 156/67 (02/20/20 0602)  SpO2: 96 % (02/20/20 0602) Vital Signs (24h Range):  Temp:  [98.2 °F (36.8 °C)-98.6 °F (37 °C)] 98.2 °F (36.8 °C)  Pulse:  [40-62] 40  Resp:  [16-20] 20  SpO2:  [95 %-99 %] 96 %  BP: (110-179)/(67-80) 156/67     Weight: 81.4 kg (179 lb 6.4 oz)  Body mass index is 27.28 kg/m².    Physical Exam   Constitutional: He appears well-developed. No distress.   Elderly demented  male, in no respiratory distress.  Comfortably lying in bed.  Spouse at the bedside.   HENT:   Head: Normocephalic and atraumatic.   Eyes: Pupils are equal, round, and reactive to light. Conjunctivae and EOM are normal. No scleral icterus.   Neck: Normal range of motion. Neck supple. No thyromegaly present.    Cardiovascular: Regular rhythm, normal heart sounds and intact distal pulses. Bradycardia present.   No murmur heard.  Pulmonary/Chest: Effort normal. No respiratory distress. He has decreased breath sounds in the left middle field and the left lower field. He has no wheezes. He exhibits no tenderness.   Abdominal: Soft. Bowel sounds are normal. There is no tenderness.   Musculoskeletal: Normal range of motion. He exhibits no edema or tenderness.   Neurological: He is alert. No cranial nerve deficit. He exhibits normal muscle tone. Coordination normal.   Skin: Skin is warm and dry. He is not diaphoretic.   Psychiatric: He has a normal mood and affect. He is slowed. Cognition and memory are impaired.   Nursing note and vitals reviewed.        CRANIAL NERVES     CN III, IV, VI   Pupils are equal, round, and reactive to light.  Extraocular motions are normal.        Significant Labs:   BMP:   Recent Labs   Lab 02/19/20  2246         K 4.7      CO2 27   BUN 33*   CREATININE 1.5*   CALCIUM 9.1     CBC:   Recent Labs   Lab 02/19/20  2246   WBC 6.60   HGB 10.9*   HCT 34.5*        CMP:   Recent Labs   Lab 02/19/20  2246      K 4.7      CO2 27      BUN 33*   CREATININE 1.5*   CALCIUM 9.1   PROT 6.6   ALBUMIN 3.0*   BILITOT 0.4   ALKPHOS 177*   AST 17   ALT 15   ANIONGAP 5*   EGFRNONAA 42*     Cardiac Markers:   Recent Labs   Lab 02/19/20  2246   BNP 1,542*     Lactic Acid:   Recent Labs   Lab 02/19/20  2246   LACTATE 0.9     Troponin: No results for input(s): TROPONINI in the last 48 hours.  Urine Studies: No results for input(s): COLORU, APPEARANCEUA, PHUR, SPECGRAV, PROTEINUA, GLUCUA, KETONESU, BILIRUBINUA, OCCULTUA, NITRITE, UROBILINOGEN, LEUKOCYTESUR, RBCUA, WBCUA, BACTERIA, SQUAMEPITHEL, HYALINECASTS in the last 48 hours.    Invalid input(s): WRIGHTSUR  All pertinent labs within the past 24 hours have been reviewed.    Significant Imaging: I have reviewed and interpreted  all pertinent imaging results/findings within the past 24 hours.     Imaging Results          CT Chest Without Contrast (In process)                X-Ray Chest 1 View (Final result)  Result time 02/19/20 22:15:17    Final result by Orlin Hernandez MD (02/19/20 22:15:17)                 Impression:      1.  Interval development of a large laterally positioned, possibly loculated left pleural effusion with adjacent atelectasis/consolidation in the mid and lower left lung.  Differential considerations would include pneumonia with a loculated parapneumonic effusion or empyema, or primary effusion that is loculated laterally of unclear etiology.  No definite underlying rib fracture is seen.  Negative for pneumothorax.    2.  Negative for acute process otherwise.    3.  Stable findings as noted above.      Electronically signed by: Orlin Hernandez MD  Date:    02/19/2020  Time:    22:15             Narrative:    EXAMINATION:  XR CHEST 1 VIEW; XR RIBS 2 VIEW RIGHT    CLINICAL HISTORY:  Unspecified fall, initial encounter    COMPARISON:  February 9, 2019    FINDINGS:  There is been interval development of a moderate to large laterally positioned left pleural effusion versus marked pleural thickening.  Adjacent lateral and inferior left lung atelectasis.  Stable scarring or chronic atelectasis in the right lung base.  Stable blunting of the right lateral costophrenic angle.  The lungs are otherwise clear.  The cardiac silhouette size is enlarged.  The trachea is midline and the mediastinal width is normal. Negative for pneumothorax.  Pulmonary vasculature is normal. Negative for osseous abnormalities. Ectatic and tortuous aorta with calcifications of the aortic knob.  Degenerative changes of the spine and both shoulder girdles with convex-right curvature.  Postoperative changes involve the right clavicle.    There is an old fracture involving the lateral left 8th rib.  Negative for definite acute rib fracture.                                X-Ray Ribs 2 View Right (Final result)  Result time 02/19/20 22:15:17    Final result by Orlin Hernandez MD (02/19/20 22:15:17)                 Impression:      1.  Interval development of a large laterally positioned, possibly loculated left pleural effusion with adjacent atelectasis/consolidation in the mid and lower left lung.  Differential considerations would include pneumonia with a loculated parapneumonic effusion or empyema, or primary effusion that is loculated laterally of unclear etiology.  No definite underlying rib fracture is seen.  Negative for pneumothorax.    2.  Negative for acute process otherwise.    3.  Stable findings as noted above.      Electronically signed by: Orlin Hernandez MD  Date:    02/19/2020  Time:    22:15             Narrative:    EXAMINATION:  XR CHEST 1 VIEW; XR RIBS 2 VIEW RIGHT    CLINICAL HISTORY:  Unspecified fall, initial encounter    COMPARISON:  February 9, 2019    FINDINGS:  There is been interval development of a moderate to large laterally positioned left pleural effusion versus marked pleural thickening.  Adjacent lateral and inferior left lung atelectasis.  Stable scarring or chronic atelectasis in the right lung base.  Stable blunting of the right lateral costophrenic angle.  The lungs are otherwise clear.  The cardiac silhouette size is enlarged.  The trachea is midline and the mediastinal width is normal. Negative for pneumothorax.  Pulmonary vasculature is normal. Negative for osseous abnormalities. Ectatic and tortuous aorta with calcifications of the aortic knob.  Degenerative changes of the spine and both shoulder girdles with convex-right curvature.  Postoperative changes involve the right clavicle.    There is an old fracture involving the lateral left 8th rib.  Negative for definite acute rib fracture.                                I have independently reviewed and interpreted the EKG.    I have independently reviewed all pertinent labs within  the past 24 hours.    I have independently reviewed, visualized and interpreted all pertinent imaging results within the past 24 hours and discussed the findings with the ED physician, Dr. Brito.

## 2020-02-20 NOTE — TELEPHONE ENCOUNTER
Called patient wife stated that he got apixaban yesterday evening.  Thoracentesis can be done Saturday morning.

## 2020-02-20 NOTE — H&P
[facility] Hospital Medicine  History & Physical    Patient Name: Santos Morales  MRN: [id number]  Admission Date: 2/19/2020  Attending Physician: Raffi Camara MD  Primary Care Provider: Diane Bowen MD         Patient information was obtained from patient, spouse/SO, past medical records and ER records.     Subjective:     Principal Problem:Empyema, left    Chief Complaint:   Chief Complaint   Patient presents with    Fall     trip-R rib pain, EMS reports lungs clear. Pain with ROM.         HPI: Mr. Morales is an elderly 84-year-old  male with PMH significant for atrial fibrillation, currently anticoagulated on apixaban, dementia, presented to the ED after sustaining a fall onto his left side.  According to the patient's wife, patient has chronic bradycardia, heart rate is all within the 30s to 40s, but declined pacemaker placement in the past.  Denies syncopal episode today.  Does not like to use walkers or canes (according to the spouse).  He started complaining of left rib pain and was brought to the ED via ambulance.  Imaging studies revealed large left-sided loculated pleural effusion with old left posterior 8th rib fracture.  Laboratory workup reveals elevated BNP of 1500, creatinine 1.5 (baseline around 1.2 few years ago).  Patient is currently comfortable, complaining of minimal pain.    Admit diagnosis left loculated pleural effusion versus empyema.    Past Medical History:   Diagnosis Date    Anxiety     Atrial fibrillation     Dementia 02/19/2020    Depression     Hypertension     Insomnia     Mobitz type 1 second degree AV block     followed by Dr. Hardy, Cardiology    SAMUEL (obstructive sleep apnea)        Past Surgical History:   Procedure Laterality Date    BACK SURGERY      CATARACT EXTRACTION W/  INTRAOCULAR LENS IMPLANT Bilateral        Review of patient's allergies indicates:  No Known Allergies    No current facility-administered medications  on file prior to encounter.      Current Outpatient Medications on File Prior to Encounter   Medication Sig    apixaban (ELIQUIS) 5 mg Tab Take 1 tablet (5 mg total) by mouth 2 (two) times daily.    clonazePAM (KLONOPIN) 0.5 MG tablet Take 1 tablet (0.5 mg total) by mouth every evening.    isosorbide mononitrate (IMDUR) 60 MG 24 hr tablet Take 1 tablet (60 mg total) by mouth once daily.    melatonin 1 mg Tab Take 1 tablet by mouth every evening.    sertraline (ZOLOFT) 100 MG tablet Take 1 tablet (100 mg total) by mouth once daily.    sertraline (ZOLOFT) 50 MG tablet TAKE 1 TABLET BY MOUTH ONCE DAILY FOR A TOTAL OF 150MG PER DAY.    spironolactone (ALDACTONE) 25 MG tablet TAKE 1 TABLET BY MOUTH ONCE DAILY    traZODone (DESYREL) 150 MG tablet TAKE ONE TABLET BY MOUTH ONCE DAILY IN THE EVENING    candesartan (ATACAND) 8 MG tablet Take 2 tablets (16 mg total) by mouth once daily.    furosemide (LASIX) 40 MG tablet Take 1 tablet (40 mg total) by mouth daily as needed. Take next 3 days     Family History     Problem Relation (Age of Onset)    Cancer Father    Diabetes Paternal Grandmother        Tobacco Use    Smoking status: Never Smoker    Smokeless tobacco: Never Used   Substance and Sexual Activity    Alcohol use: Yes     Comment: socially     Drug use: No    Sexual activity: Not on file     Review of Systems   Constitutional: Negative.  Negative for appetite change, fatigue and fever.   HENT: Negative.  Negative for congestion, nosebleeds and sore throat.    Eyes: Negative.  Negative for visual disturbance.   Respiratory: Positive for shortness of breath. Negative for cough and wheezing.    Cardiovascular: Negative.  Negative for chest pain, palpitations and leg swelling.   Gastrointestinal: Negative.  Negative for abdominal pain, constipation, diarrhea, nausea and vomiting.   Endocrine: Negative.  Negative for polyuria.   Genitourinary: Negative.  Negative for dysuria, flank pain, frequency and  urgency.   Musculoskeletal: Negative.  Negative for arthralgias, back pain and joint swelling.   Skin: Negative.  Negative for color change, pallor and rash.   Allergic/Immunologic: Negative.  Negative for immunocompromised state.   Neurological: Negative.  Negative for dizziness, syncope, weakness, light-headedness, numbness and headaches.   Hematological: Negative.    Psychiatric/Behavioral: Positive for decreased concentration. Negative for confusion and hallucinations. The patient is not nervous/anxious.    All other systems reviewed and are negative.    Objective:     Vital Signs (Most Recent):  Temp: 98.2 °F (36.8 °C) (02/19/20 2320)  Pulse: (!) 40 (02/20/20 0602)  Resp: 20 (02/20/20 0602)  BP: (!) 156/67 (02/20/20 0602)  SpO2: 96 % (02/20/20 0602) Vital Signs (24h Range):  Temp:  [98.2 °F (36.8 °C)-98.6 °F (37 °C)] 98.2 °F (36.8 °C)  Pulse:  [40-62] 40  Resp:  [16-20] 20  SpO2:  [95 %-99 %] 96 %  BP: (110-179)/(67-80) 156/67     Weight: 81.4 kg (179 lb 6.4 oz)  Body mass index is 27.28 kg/m².    Physical Exam   Constitutional: He appears well-developed. No distress.   Elderly demented  male, in no respiratory distress.  Comfortably lying in bed.  Spouse at the bedside.   HENT:   Head: Normocephalic and atraumatic.   Eyes: Pupils are equal, round, and reactive to light. Conjunctivae and EOM are normal. No scleral icterus.   Neck: Normal range of motion. Neck supple. No thyromegaly present.   Cardiovascular: Regular rhythm, normal heart sounds and intact distal pulses. Bradycardia present.   No murmur heard.  Pulmonary/Chest: Effort normal. No respiratory distress. He has decreased breath sounds in the left middle field and the left lower field. He has no wheezes. He exhibits no tenderness.   Abdominal: Soft. Bowel sounds are normal. There is no tenderness.   Musculoskeletal: Normal range of motion. He exhibits no edema or tenderness.   Neurological: He is alert. No cranial nerve deficit. He exhibits  normal muscle tone. Coordination normal.   Skin: Skin is warm and dry. He is not diaphoretic.   Psychiatric: He has a normal mood and affect. He is slowed. Cognition and memory are impaired.   Nursing note and vitals reviewed.        CRANIAL NERVES     CN III, IV, VI   Pupils are equal, round, and reactive to light.  Extraocular motions are normal.        Significant Labs:   BMP:   Recent Labs   Lab 02/19/20  2246         K 4.7      CO2 27   BUN 33*   CREATININE 1.5*   CALCIUM 9.1     CBC:   Recent Labs   Lab 02/19/20  2246   WBC 6.60   HGB 10.9*   HCT 34.5*        CMP:   Recent Labs   Lab 02/19/20  2246      K 4.7      CO2 27      BUN 33*   CREATININE 1.5*   CALCIUM 9.1   PROT 6.6   ALBUMIN 3.0*   BILITOT 0.4   ALKPHOS 177*   AST 17   ALT 15   ANIONGAP 5*   EGFRNONAA 42*     Cardiac Markers:   Recent Labs   Lab 02/19/20 2246   BNP 1,542*     Lactic Acid:   Recent Labs   Lab 02/19/20  2246   LACTATE 0.9     Troponin: No results for input(s): TROPONINI in the last 48 hours.  Urine Studies: No results for input(s): COLORU, APPEARANCEUA, PHUR, SPECGRAV, PROTEINUA, GLUCUA, KETONESU, BILIRUBINUA, OCCULTUA, NITRITE, UROBILINOGEN, LEUKOCYTESUR, RBCUA, WBCUA, BACTERIA, SQUAMEPITHEL, HYALINECASTS in the last 48 hours.    Invalid input(s): WRIGHTSUR  All pertinent labs within the past 24 hours have been reviewed.    Significant Imaging: I have reviewed and interpreted all pertinent imaging results/findings within the past 24 hours.     Imaging Results          CT Chest Without Contrast (In process)                X-Ray Chest 1 View (Final result)  Result time 02/19/20 22:15:17    Final result by Orlin Hernandez MD (02/19/20 22:15:17)                 Impression:      1.  Interval development of a large laterally positioned, possibly loculated left pleural effusion with adjacent atelectasis/consolidation in the mid and lower left lung.  Differential considerations would include  pneumonia with a loculated parapneumonic effusion or empyema, or primary effusion that is loculated laterally of unclear etiology.  No definite underlying rib fracture is seen.  Negative for pneumothorax.    2.  Negative for acute process otherwise.    3.  Stable findings as noted above.      Electronically signed by: Orlin Hernandez MD  Date:    02/19/2020  Time:    22:15             Narrative:    EXAMINATION:  XR CHEST 1 VIEW; XR RIBS 2 VIEW RIGHT    CLINICAL HISTORY:  Unspecified fall, initial encounter    COMPARISON:  February 9, 2019    FINDINGS:  There is been interval development of a moderate to large laterally positioned left pleural effusion versus marked pleural thickening.  Adjacent lateral and inferior left lung atelectasis.  Stable scarring or chronic atelectasis in the right lung base.  Stable blunting of the right lateral costophrenic angle.  The lungs are otherwise clear.  The cardiac silhouette size is enlarged.  The trachea is midline and the mediastinal width is normal. Negative for pneumothorax.  Pulmonary vasculature is normal. Negative for osseous abnormalities. Ectatic and tortuous aorta with calcifications of the aortic knob.  Degenerative changes of the spine and both shoulder girdles with convex-right curvature.  Postoperative changes involve the right clavicle.    There is an old fracture involving the lateral left 8th rib.  Negative for definite acute rib fracture.                               X-Ray Ribs 2 View Right (Final result)  Result time 02/19/20 22:15:17    Final result by Orlin Hernandez MD (02/19/20 22:15:17)                 Impression:      1.  Interval development of a large laterally positioned, possibly loculated left pleural effusion with adjacent atelectasis/consolidation in the mid and lower left lung.  Differential considerations would include pneumonia with a loculated parapneumonic effusion or empyema, or primary effusion that is loculated laterally of unclear  etiology.  No definite underlying rib fracture is seen.  Negative for pneumothorax.    2.  Negative for acute process otherwise.    3.  Stable findings as noted above.      Electronically signed by: Orlin Hernandez MD  Date:    02/19/2020  Time:    22:15             Narrative:    EXAMINATION:  XR CHEST 1 VIEW; XR RIBS 2 VIEW RIGHT    CLINICAL HISTORY:  Unspecified fall, initial encounter    COMPARISON:  February 9, 2019    FINDINGS:  There is been interval development of a moderate to large laterally positioned left pleural effusion versus marked pleural thickening.  Adjacent lateral and inferior left lung atelectasis.  Stable scarring or chronic atelectasis in the right lung base.  Stable blunting of the right lateral costophrenic angle.  The lungs are otherwise clear.  The cardiac silhouette size is enlarged.  The trachea is midline and the mediastinal width is normal. Negative for pneumothorax.  Pulmonary vasculature is normal. Negative for osseous abnormalities. Ectatic and tortuous aorta with calcifications of the aortic knob.  Degenerative changes of the spine and both shoulder girdles with convex-right curvature.  Postoperative changes involve the right clavicle.    There is an old fracture involving the lateral left 8th rib.  Negative for definite acute rib fracture.                                I have independently reviewed and interpreted the EKG.    I have independently reviewed all pertinent labs within the past 24 hours.    I have independently reviewed, visualized and interpreted all pertinent imaging results within the past 24 hours and discussed the findings with the ED physician, Dr. Thayer.            Assessment/Plan:     * Empyema, left  CT chest reveals left-sided loculated pleural effusion versus empyema.  Consult Pulmonary and Cardiothoracic surgery.  Keep NPO.  Bronchodilators as needed.  Supplemental oxygen to maintain saturations greater than 92%.      Loculated pleural effusion  Left  loculated pleural effusion versus empyema.  Pulmonary consult.      Symptomatic bradycardia  Patient had a fall at home onto his left side.  No loss of consciousness.  Heart rate in the 30s to 50s.  According to the spouse, this is baseline, and he adamantly declined pacemaker in the past.        Atrial fibrillation, persistent  Currently bradycardic, heart rate in the 30s to 40s.  Patient declined pacemaker in the past.  Last dose of apixaban last night.      CKD (chronic kidney disease) stage 3, GFR 30-59 ml/min  Serum creatinine 1.5, (was 1.2 few years ago).  Avoid nephrotoxin agents.      Chronic diastolic heart failure  Echocardiogram done in 2018 reveals indeterminate diastolic function, EF 60-65%.  Currently not in exacerbation.  Monitor closely on telemetry.        VTE Risk Mitigation (From admission, onward)         Ordered     Place sequential compression device  Until discontinued      02/20/20 2744                   Raffi Camara MD  Department of Hospital Medicine   [facility]

## 2020-02-20 NOTE — CONSULTS
[facility] -   Cardiothoracic Surgery  Consult Note    Patient Name: Santos Morales  MRN: [id number]  Admission Date: 2/19/2020  Attending Physician: Raffi Camara MD  Referring Provider: Self, Aaareferral    Patient information was obtained from patient, spouse/SO, past medical records and ER records.     Consults  Subjective:     Principal Problem: Empyema, left    History of Present Illness:   02/20/2020  The patient is an elderly frail appearing 84 year old male with a left loculated pleural effusion that was worked up in the ED for a witnessed fall onto the left side. The patient has dementia, hypertension, atrial fibrillation, second degree heart block, chronic severe bradycardia, SAMUEL, anxiety, depression. Patient refused surgical intervention with a pacemaker in the past. Patient's resting heart rate is in the low to mid 30s. The patient is confused. He has a flat affect. He is frail. Unsteady gait and balance. Wife reports multiple witnessed falls within the past week. He is currently anticoagulated with apixaban for his atrial fibrillation, last dose yesterday. ED workup revealed BNP 1,542. Productive cough x 1 week. Dyspnea on exertion. Denies chest pain. Denies orthopnea. Patient's wife reports that the severity of the patient's dementia has been progressing rapidly as of late. Denies chest pain, fever, malaise, syncope, dizziness, lightheadedness, tremors, abdominal pain, diarrhea, and edema.        No current facility-administered medications on file prior to encounter.      Current Outpatient Medications on File Prior to Encounter   Medication Sig    apixaban (ELIQUIS) 5 mg Tab Take 1 tablet (5 mg total) by mouth 2 (two) times daily.    clonazePAM (KLONOPIN) 0.5 MG tablet Take 1 tablet (0.5 mg total) by mouth every evening.    isosorbide mononitrate (IMDUR) 60 MG 24 hr tablet Take 1 tablet (60 mg total) by mouth once daily.    melatonin 1 mg Tab Take 1 tablet by mouth every evening.     sertraline (ZOLOFT) 100 MG tablet Take 1 tablet (100 mg total) by mouth once daily.    sertraline (ZOLOFT) 50 MG tablet TAKE 1 TABLET BY MOUTH ONCE DAILY FOR A TOTAL OF 150MG PER DAY.    spironolactone (ALDACTONE) 25 MG tablet TAKE 1 TABLET BY MOUTH ONCE DAILY    traZODone (DESYREL) 150 MG tablet TAKE ONE TABLET BY MOUTH ONCE DAILY IN THE EVENING    candesartan (ATACAND) 8 MG tablet Take 2 tablets (16 mg total) by mouth once daily.    furosemide (LASIX) 40 MG tablet Take 1 tablet (40 mg total) by mouth daily as needed. Take next 3 days       Review of patient's allergies indicates:  No Known Allergies    Past Medical History:   Diagnosis Date    Anxiety     Atrial fibrillation     Dementia 02/19/2020    Depression     Hypertension     Insomnia     Mobitz type 1 second degree AV block     followed by Dr. Hardy, Cardiology    SAMUEL (obstructive sleep apnea)      Past Surgical History:   Procedure Laterality Date    BACK SURGERY      CATARACT EXTRACTION W/  INTRAOCULAR LENS IMPLANT Bilateral      Family History     Problem Relation (Age of Onset)    Cancer Father    Diabetes Paternal Grandmother        Tobacco Use    Smoking status: Never Smoker    Smokeless tobacco: Never Used   Substance and Sexual Activity    Alcohol use: Yes     Comment: socially     Drug use: No    Sexual activity: Not on file     Review of Systems   Constitutional: Positive for activity change and fatigue. Negative for appetite change, chills, diaphoresis, fever and unexpected weight change.   HENT: Positive for congestion, dental problem, hearing loss and sore throat. Negative for drooling, ear discharge, ear pain, facial swelling, mouth sores, nosebleeds, postnasal drip, rhinorrhea, sinus pressure, sinus pain, sneezing, tinnitus, trouble swallowing and voice change.    Eyes: Negative for photophobia, pain, discharge, redness, itching and visual disturbance.   Respiratory: Positive for cough, shortness of breath and wheezing.  Negative for apnea, choking, chest tightness and stridor.         Productive cough x 1 week   Cardiovascular: Positive for palpitations and leg swelling. Negative for chest pain.        Chronic bradycardia   Gastrointestinal: Positive for constipation. Negative for abdominal distention, abdominal pain, anal bleeding, blood in stool, diarrhea, nausea, rectal pain and vomiting.   Endocrine: Positive for cold intolerance. Negative for heat intolerance, polydipsia, polyphagia and polyuria.   Genitourinary: Negative for decreased urine volume, difficulty urinating, discharge, dysuria, enuresis, flank pain, frequency, genital sores, hematuria, penile pain, penile swelling, scrotal swelling, testicular pain and urgency.   Musculoskeletal: Positive for gait problem. Negative for arthralgias, back pain, joint swelling, myalgias, neck pain and neck stiffness.   Skin: Negative for color change, pallor, rash and wound.   Allergic/Immunologic: Negative for environmental allergies, food allergies and immunocompromised state.   Neurological: Positive for weakness. Negative for dizziness, tremors, seizures, syncope, facial asymmetry, speech difficulty, light-headedness, numbness and headaches.   Hematological: Negative for adenopathy. Bruises/bleeds easily.   Psychiatric/Behavioral: Positive for agitation, confusion, decreased concentration, dysphoric mood, hallucinations and sleep disturbance. Negative for behavioral problems, self-injury and suicidal ideas. The patient is nervous/anxious. The patient is not hyperactive.         Dementia.      Objective:     Vital Signs (Most Recent):  Temp: 98.1 °F (36.7 °C) (02/20/20 0758)  Pulse: (!) 40 (02/20/20 0758)  Resp: 18 (02/20/20 0758)  BP: (!) 140/38 (02/20/20 0758)  SpO2: (!) 94 % (02/20/20 0758) Vital Signs (24h Range):  Temp:  [98.1 °F (36.7 °C)-98.9 °F (37.2 °C)] 98.1 °F (36.7 °C)  Pulse:  [37-62] 40  Resp:  [16-20] 18  SpO2:  [94 %-99 %] 94 %  BP: (110-179)/(38-90) 140/38      Weight: 81.4 kg (179 lb 6.4 oz)  Body mass index is 27.28 kg/m².    SpO2: (!) 94 %  O2 Device (Oxygen Therapy): room air     Intake/Output - Last 3 Shifts     None           Lines/Drains/Airways     Peripheral Intravenous Line                 Peripheral IV - Single Lumen 02/19/20 20 G Left Forearm 1 day         Peripheral IV - Single Lumen 02/19/20 2247 20 G Right Antecubital less than 1 day                 STS Risk Score: N/A    Physical Exam   Constitutional: He appears well-developed and well-nourished. No distress.   Demented, elderly, frail appearing patient.    HENT:   Head: Normocephalic and atraumatic.   Eyes: Pupils are equal, round, and reactive to light. EOM are normal.   Neck: Normal range of motion. Neck supple. No JVD present.   Cardiovascular: Normal heart sounds and intact distal pulses. Exam reveals no gallop and no friction rub.   No murmur heard.  Irregular bradycardia (36bpm)   Pulmonary/Chest: Effort normal. No stridor. No respiratory distress. He has no wheezes. He has rales. He exhibits no tenderness.   + rales. +coarse breath sounds. Diminished breath sounds left posterior lower lobe. Maintaining adequate O2 saturation on room air.    Abdominal: Soft. Bowel sounds are normal. He exhibits no distension and no mass. There is no tenderness. There is no rebound and no guarding. No hernia.   Musculoskeletal: Normal range of motion. He exhibits no edema, tenderness or deformity.   Neurological: He is alert. He displays normal reflexes. No cranial nerve deficit or sensory deficit. He exhibits normal muscle tone. Coordination normal.   Dementia.    Skin: Skin is warm and dry. Capillary refill takes less than 2 seconds. No rash noted. He is not diaphoretic. No erythema. No pallor.   Psychiatric:   Flat affect. Slow to respond to questions. Confused.    Nursing note and vitals reviewed.      Significant Labs:  BMP:   Recent Labs   Lab 02/19/20  2246         K 4.7      CO2 27    BUN 33*   CREATININE 1.5*   CALCIUM 9.1     CBC:   Recent Labs   Lab 02/19/20  2246   WBC 6.60   RBC 4.12*   HGB 10.9*   HCT 34.5*      MCV 84   MCH 26.5*   MCHC 31.6*       Significant Diagnostics:  CT: I have reviewed all pertinent results/findings within the past 24 hours  CXR: I have reviewed all pertinent results/findings within the past 24 hours    Assessment/Plan:     NYHA Score: N/A    Loculated pleural effusion  02/20/2020  The patient is an elderly frail appearing 84 year old male with a left loculated pleural effusion that was worked up in the ED for a witnessed fall onto the left side. The patient has dementia, hypertension, atrial fibrillation, second degree heart block, chronic severe bradycardia, SAMUEL, anxiety, depression. Patient refused surgical intervention with a pacemaker in the past. Patient's resting heart rate is in the low to mid 30s. He is currently anticoagulated with apixaban for his atrial fibrillation, last dose 02/19/2020. Life threatening bradycardia precludes any surgical intervention. Patient and his wife expressed in the past that they do not want surgical intervention. Does not want surgical intervention for his heart block and chronic severe bradycardia. Recommend thoracentesis and evaluation by the pulmonology service.          Thank you for your consult. I will follow-up with patient. Please contact us if you have any additional questions.    Cameron Cuevas NP  Cardiothoracic Surgery  [facility] [place]

## 2020-02-20 NOTE — ED NOTES
Pt lying in bed in NAD,VSS,RR equal and unlabored. Bed is low, locked, and call light in reach. Side rails up x 2.  Pt wife at bedside.

## 2020-02-20 NOTE — ASSESSMENT & PLAN NOTE
Echocardiogram done in 2018 reveals indeterminate diastolic function, EF 60-65%.  Currently not in exacerbation.  Monitor closely on telemetry.

## 2020-02-20 NOTE — SUBJECTIVE & OBJECTIVE
No current facility-administered medications on file prior to encounter.      Current Outpatient Medications on File Prior to Encounter   Medication Sig    apixaban (ELIQUIS) 5 mg Tab Take 1 tablet (5 mg total) by mouth 2 (two) times daily.    clonazePAM (KLONOPIN) 0.5 MG tablet Take 1 tablet (0.5 mg total) by mouth every evening.    isosorbide mononitrate (IMDUR) 60 MG 24 hr tablet Take 1 tablet (60 mg total) by mouth once daily.    melatonin 1 mg Tab Take 1 tablet by mouth every evening.    sertraline (ZOLOFT) 100 MG tablet Take 1 tablet (100 mg total) by mouth once daily.    sertraline (ZOLOFT) 50 MG tablet TAKE 1 TABLET BY MOUTH ONCE DAILY FOR A TOTAL OF 150MG PER DAY.    spironolactone (ALDACTONE) 25 MG tablet TAKE 1 TABLET BY MOUTH ONCE DAILY    traZODone (DESYREL) 150 MG tablet TAKE ONE TABLET BY MOUTH ONCE DAILY IN THE EVENING    candesartan (ATACAND) 8 MG tablet Take 2 tablets (16 mg total) by mouth once daily.    furosemide (LASIX) 40 MG tablet Take 1 tablet (40 mg total) by mouth daily as needed. Take next 3 days       Review of patient's allergies indicates:  No Known Allergies    Past Medical History:   Diagnosis Date    Anxiety     Atrial fibrillation     Dementia 02/19/2020    Depression     Hypertension     Insomnia     Mobitz type 1 second degree AV block     followed by Dr. Hardy, Cardiology    SAMUEL (obstructive sleep apnea)      Past Surgical History:   Procedure Laterality Date    BACK SURGERY      CATARACT EXTRACTION W/  INTRAOCULAR LENS IMPLANT Bilateral      Family History     Problem Relation (Age of Onset)    Cancer Father    Diabetes Paternal Grandmother        Tobacco Use    Smoking status: Never Smoker    Smokeless tobacco: Never Used   Substance and Sexual Activity    Alcohol use: Yes     Comment: socially     Drug use: No    Sexual activity: Not on file     Review of Systems   Constitutional: Positive for activity change and fatigue. Negative for appetite  change, chills, diaphoresis, fever and unexpected weight change.   HENT: Positive for congestion, dental problem, hearing loss and sore throat. Negative for drooling, ear discharge, ear pain, facial swelling, mouth sores, nosebleeds, postnasal drip, rhinorrhea, sinus pressure, sinus pain, sneezing, tinnitus, trouble swallowing and voice change.    Eyes: Negative for photophobia, pain, discharge, redness, itching and visual disturbance.   Respiratory: Positive for cough, shortness of breath and wheezing. Negative for apnea, choking, chest tightness and stridor.         Productive cough x 1 week   Cardiovascular: Positive for palpitations and leg swelling. Negative for chest pain.        Chronic bradycardia   Gastrointestinal: Positive for constipation. Negative for abdominal distention, abdominal pain, anal bleeding, blood in stool, diarrhea, nausea, rectal pain and vomiting.   Endocrine: Positive for cold intolerance. Negative for heat intolerance, polydipsia, polyphagia and polyuria.   Genitourinary: Negative for decreased urine volume, difficulty urinating, discharge, dysuria, enuresis, flank pain, frequency, genital sores, hematuria, penile pain, penile swelling, scrotal swelling, testicular pain and urgency.   Musculoskeletal: Positive for gait problem. Negative for arthralgias, back pain, joint swelling, myalgias, neck pain and neck stiffness.   Skin: Negative for color change, pallor, rash and wound.   Allergic/Immunologic: Negative for environmental allergies, food allergies and immunocompromised state.   Neurological: Positive for weakness. Negative for dizziness, tremors, seizures, syncope, facial asymmetry, speech difficulty, light-headedness, numbness and headaches.   Hematological: Negative for adenopathy. Bruises/bleeds easily.   Psychiatric/Behavioral: Positive for agitation, confusion, decreased concentration, dysphoric mood, hallucinations and sleep disturbance. Negative for behavioral problems,  self-injury and suicidal ideas. The patient is nervous/anxious. The patient is not hyperactive.         Dementia.      Objective:     Vital Signs (Most Recent):  Temp: 98.1 °F (36.7 °C) (02/20/20 0758)  Pulse: (!) 40 (02/20/20 0758)  Resp: 18 (02/20/20 0758)  BP: (!) 140/38 (02/20/20 0758)  SpO2: (!) 94 % (02/20/20 0758) Vital Signs (24h Range):  Temp:  [98.1 °F (36.7 °C)-98.9 °F (37.2 °C)] 98.1 °F (36.7 °C)  Pulse:  [37-62] 40  Resp:  [16-20] 18  SpO2:  [94 %-99 %] 94 %  BP: (110-179)/(38-90) 140/38     Weight: 81.4 kg (179 lb 6.4 oz)  Body mass index is 27.28 kg/m².    SpO2: (!) 94 %  O2 Device (Oxygen Therapy): room air     Intake/Output - Last 3 Shifts     None           Lines/Drains/Airways     Peripheral Intravenous Line                 Peripheral IV - Single Lumen 02/19/20 20 G Left Forearm 1 day         Peripheral IV - Single Lumen 02/19/20 2247 20 G Right Antecubital less than 1 day                 STS Risk Score: N/A    Physical Exam   Constitutional: He appears well-developed and well-nourished. No distress.   Demented, elderly, frail appearing patient.    HENT:   Head: Normocephalic and atraumatic.   Eyes: Pupils are equal, round, and reactive to light. EOM are normal.   Neck: Normal range of motion. Neck supple. No JVD present.   Cardiovascular: Normal heart sounds and intact distal pulses. Exam reveals no gallop and no friction rub.   No murmur heard.  Irregular bradycardia (36bpm)   Pulmonary/Chest: Effort normal. No stridor. No respiratory distress. He has no wheezes. He has rales. He exhibits no tenderness.   + rales. +coarse breath sounds. Diminished breath sounds left posterior lower lobe. Maintaining adequate O2 saturation on room air.    Abdominal: Soft. Bowel sounds are normal. He exhibits no distension and no mass. There is no tenderness. There is no rebound and no guarding. No hernia.   Musculoskeletal: Normal range of motion. He exhibits no edema, tenderness or deformity.   Neurological: He  is alert. He displays normal reflexes. No cranial nerve deficit or sensory deficit. He exhibits normal muscle tone. Coordination normal.   Dementia.    Skin: Skin is warm and dry. Capillary refill takes less than 2 seconds. No rash noted. He is not diaphoretic. No erythema. No pallor.   Psychiatric:   Flat affect. Slow to respond to questions. Confused.    Nursing note and vitals reviewed.      Significant Labs:  BMP:   Recent Labs   Lab 02/19/20 2246         K 4.7      CO2 27   BUN 33*   CREATININE 1.5*   CALCIUM 9.1     CBC:   Recent Labs   Lab 02/19/20 2246   WBC 6.60   RBC 4.12*   HGB 10.9*   HCT 34.5*      MCV 84   MCH 26.5*   MCHC 31.6*       Significant Diagnostics:  CT: I have reviewed all pertinent results/findings within the past 24 hours  CXR: I have reviewed all pertinent results/findings within the past 24 hours

## 2020-02-20 NOTE — SUBJECTIVE & OBJECTIVE
Past Medical History:   Diagnosis Date    Anxiety     Atrial fibrillation     Dementia 02/19/2020    Depression     Hypertension     Insomnia     Mobitz type 1 second degree AV block     followed by Dr. Hardy, Cardiology    SAMUEL (obstructive sleep apnea)        Past Surgical History:   Procedure Laterality Date    BACK SURGERY      CATARACT EXTRACTION W/  INTRAOCULAR LENS IMPLANT Bilateral        Review of patient's allergies indicates:  No Known Allergies    Family History     Problem Relation (Age of Onset)    Cancer Father    Diabetes Paternal Grandmother        Tobacco Use    Smoking status: Never Smoker    Smokeless tobacco: Never Used   Substance and Sexual Activity    Alcohol use: Yes     Comment: socially     Drug use: No    Sexual activity: Not on file         Review of Systems   Unable to perform ROS: Dementia (Hard of hearing)     Objective:     Vital Signs (Most Recent):  Temp: 98 °F (36.7 °C) (02/20/20 1213)  Pulse: (!) 41 (02/20/20 1303)  Resp: 18 (02/20/20 0758)  BP: (!) 160/48 (02/20/20 1213)  SpO2: (!) 94 % (02/20/20 1213) Vital Signs (24h Range):  Temp:  [98 °F (36.7 °C)-98.9 °F (37.2 °C)] 98 °F (36.7 °C)  Pulse:  [37-62] 41  Resp:  [16-20] 18  SpO2:  [94 %-99 %] 94 %  BP: (110-179)/(38-90) 160/48     Weight: 81.1 kg (178 lb 12.7 oz)  Body mass index is 27.19 kg/m².      Intake/Output Summary (Last 24 hours) at 2/20/2020 1457  Last data filed at 2/20/2020 0942  Gross per 24 hour   Intake 0 ml   Output 250 ml   Net -250 ml       Physical Exam   Constitutional: He appears well-developed and well-nourished. No distress.   HENT:   Head: Normocephalic and atraumatic.   Hard of hearing.   Eyes: EOM are normal.   Neck: Neck supple.   Cardiovascular:   Bradycardic irregular S1-S2   Pulmonary/Chest: Effort normal. No respiratory distress.   Breath sounds decreased at bases  Decreased on the left more than the right.   Abdominal: Soft. There is no tenderness.   Musculoskeletal: He exhibits no  edema.   Neurological: He is alert.   Confused, follows commands at times.   Psychiatric: He has a normal mood and affect.   Nursing note and vitals reviewed.      Vents:       Lines/Drains/Airways     Peripheral Intravenous Line                 Peripheral IV - Single Lumen 02/19/20 20 G Left Forearm 1 day         Peripheral IV - Single Lumen 02/19/20 2247 20 G Right Antecubital less than 1 day                Significant Labs:    CBC/Anemia Profile:  Recent Labs   Lab 02/19/20 2246   WBC 6.60   HGB 10.9*   HCT 34.5*      MCV 84   RDW 15.2*        Chemistries:  Recent Labs   Lab 02/19/20 2246      K 4.7      CO2 27   BUN 33*   CREATININE 1.5*   CALCIUM 9.1   ALBUMIN 3.0*   PROT 6.6   BILITOT 0.4   ALKPHOS 177*   ALT 15   AST 17       EKG May 2019 AFib with slow ventricular response    2D echo 2018      1 - Moderately enlarged aortic root.     2 - Biatrial enlargement.     3 - Concentric hypertrophy.     4 - No wall motion abnormalities.     5 - Normal left ventricular systolic function (EF 60-65%).     6 - Indeterminate LV diastolic function.     7 - Normal right ventricular systolic function .     8 - The estimated PA systolic pressure is 29 mmHg.     9 - Mild to moderate aortic regurgitation.     10 - Intermediate central venous pressure.       Significant Imaging:   CT: I have reviewed all pertinent results/findings within the past 24 hours and my personal findings are:  Small right pleural effusion moderate left pleural effusion atelectasis.  CXR: I have reviewed all pertinent results/findings within the past 24 hours and my personal findings are:  Left-sided effusion.

## 2020-02-20 NOTE — ED NOTES
Pt lying in bed in NAD,VSS,RR equal and unlabored. Bed is low, locked, and call light in reach. Side rails up x 2.  Wife at bedside

## 2020-02-20 NOTE — PLAN OF CARE
Patient/ family updated on plan of care, no questions at this time, will continue to monitor Ad Silva 02/20/2020 5:02 PM    Martii system available at bedside to support communication needs

## 2020-02-20 NOTE — CONSULTS
[facility]  Pulmonology  Consult Note    Patient Name: Santos Morales  MRN: [id number]  Admission Date: 2/19/2020  Hospital Length of Stay: 0 days  Code Status: Prior  Attending Physician: Raffi Camara MD  Primary Care Provider: Diane Bowen MD   Principal Problem: Empyema, left      Subjective:     HPI:  84-year-old male patient, Greenlandic speaking, used to work as a , no history of smoking, fell at home, complaining of pleuritic left-sided chest pain, nephew's wife in the room.  Tele  used.  Known with history of bradycardia, AFib with slow ventricular response anticoagulated on apixaban, as per patient wife home and called on the phone, she stated the patient took Eliquis yesterday evening.  He and his wife have refused pacemaker in the past.    Past Medical History:   Diagnosis Date    Anxiety     Atrial fibrillation     Dementia 02/19/2020    Depression     Hypertension     Insomnia     Mobitz type 1 second degree AV block     followed by Dr. Hardy, Cardiology    SAMUEL (obstructive sleep apnea)        Past Surgical History:   Procedure Laterality Date    BACK SURGERY      CATARACT EXTRACTION W/  INTRAOCULAR LENS IMPLANT Bilateral        Review of patient's allergies indicates:  No Known Allergies    Family History     Problem Relation (Age of Onset)    Cancer Father    Diabetes Paternal Grandmother        Tobacco Use    Smoking status: Never Smoker    Smokeless tobacco: Never Used   Substance and Sexual Activity    Alcohol use: Yes     Comment: socially     Drug use: No    Sexual activity: Not on file         Review of Systems   Unable to perform ROS: Dementia (Hard of hearing)     Objective:     Vital Signs (Most Recent):  Temp: 98 °F (36.7 °C) (02/20/20 1213)  Pulse: (!) 41 (02/20/20 1303)  Resp: 18 (02/20/20 0758)  BP: (!) 160/48 (02/20/20 1213)  SpO2: (!) 94 % (02/20/20 1213) Vital Signs (24h Range):  Temp:  [98 °F (36.7 °C)-98.9 °F (37.2 °C)] 98  °F (36.7 °C)  Pulse:  [37-62] 41  Resp:  [16-20] 18  SpO2:  [94 %-99 %] 94 %  BP: (110-179)/(38-90) 160/48     Weight: 81.1 kg (178 lb 12.7 oz)  Body mass index is 27.19 kg/m².      Intake/Output Summary (Last 24 hours) at 2/20/2020 1459  Last data filed at 2/20/2020 0942  Gross per 24 hour   Intake 0 ml   Output 250 ml   Net -250 ml       Physical Exam   Constitutional: He appears well-developed and well-nourished. No distress.   HENT:   Head: Normocephalic and atraumatic.   Hard of hearing.   Eyes: EOM are normal.   Neck: Neck supple.   Cardiovascular:   Bradycardic irregular S1-S2   Pulmonary/Chest: Effort normal. No respiratory distress.   Breath sounds decreased at bases  Decreased on the left more than the right.   Abdominal: Soft. There is no tenderness.   Musculoskeletal: He exhibits no edema.   Neurological: He is alert.   Confused, follows commands at times.   Psychiatric: He has a normal mood and affect.   Nursing note and vitals reviewed.      Vents:       Lines/Drains/Airways     Peripheral Intravenous Line                 Peripheral IV - Single Lumen 02/19/20 20 G Left Forearm 1 day         Peripheral IV - Single Lumen 02/19/20 2247 20 G Right Antecubital less than 1 day                Significant Labs:    CBC/Anemia Profile:  Recent Labs   Lab 02/19/20  2246   WBC 6.60   HGB 10.9*   HCT 34.5*      MCV 84   RDW 15.2*        Chemistries:  Recent Labs   Lab 02/19/20  2246      K 4.7      CO2 27   BUN 33*   CREATININE 1.5*   CALCIUM 9.1   ALBUMIN 3.0*   PROT 6.6   BILITOT 0.4   ALKPHOS 177*   ALT 15   AST 17       EKG May 2019 AFib with slow ventricular response    2D echo 2018      1 - Moderately enlarged aortic root.     2 - Biatrial enlargement.     3 - Concentric hypertrophy.     4 - No wall motion abnormalities.     5 - Normal left ventricular systolic function (EF 60-65%).     6 - Indeterminate LV diastolic function.     7 - Normal right ventricular systolic function .     8 - The  estimated PA systolic pressure is 29 mmHg.     9 - Mild to moderate aortic regurgitation.     10 - Intermediate central venous pressure.       Significant Imaging:   CT: I have reviewed all pertinent results/findings within the past 24 hours and my personal findings are:  Small right pleural effusion moderate left pleural effusion atelectasis.  CXR: I have reviewed all pertinent results/findings within the past 24 hours and my personal findings are:  Left-sided effusion.      ABG  No results for input(s): PH, PO2, PCO2, HCO3, BE in the last 168 hours.  Assessment/Plan:     Chronic congestive heart failure  Resume Arb, Lasix, spironolactone, and nitrates.  BNP 1500.    Loculated pleural effusion  Off Eliquis for thoracentesis Saturday 2/22.  Called wife on the phone.  She seems agreeable for procedure.?  Fall related effusion versus CHF.  Patient BNP is 1500.    Atrial fibrillation, persistent  Off Eliquis for thoracentesis Saturday 2/22    Symptomatic bradycardia  Cardiac monitoring.  Has refused and wife pacemaker in the past.        Thank you for your consult. I will follow-up with patient. Please contact us if you have any additional questions.     Shazia Morocho MD  Pulmonology  [facility]

## 2020-02-20 NOTE — ASSESSMENT & PLAN NOTE
Off Eliquis for thoracentesis Saturday 2/22.  Called wife on the phone.  She seems agreeable for procedure.?  Fall related effusion versus CHF.  Patient BNP is 1500.

## 2020-02-20 NOTE — HOSPITAL COURSE
Chest x-ray and CT scan of the chest reviewed.  Patient on telemetry for cardiac monitoring.  I was consulted for evaluation of his left-sided effusion.  2/21 pt seen and examined , wife at bedside . Dementia > 1 year . Does some ADL's at home . + fall , ribs fx , left sdie effusion   2/22 patient seen and examined.  Wife at bedside.  On room air.  No distress. Afebrile.  Chest x-ray yesterday reviewed.  No changes in large left-sided effusion.  2/23 seen and examined.  Wife at bedside.  On room air.  No distress. On one-to-one.  Agitated at times.

## 2020-02-20 NOTE — ASSESSMENT & PLAN NOTE
CT chest reveals left-sided loculated pleural effusion versus empyema.  Consult Pulmonary and Cardiothoracic surgery.  Keep NPO.  Bronchodilators as needed.  Supplemental oxygen to maintain saturations greater than 92%.

## 2020-02-20 NOTE — ASSESSMENT & PLAN NOTE
Patient had a fall at home onto his left side.  No loss of consciousness.  Heart rate in the 30s to 50s.  According to the spouse, this is baseline, and he adamantly declined pacemaker in the past.

## 2020-02-20 NOTE — HPI
Mr. Morales is an elderly 84-year-old  male with PMH significant for atrial fibrillation, currently anticoagulated on apixaban, dementia, presented to the ED after sustaining a fall onto his left side.  According to the patient's wife, patient has chronic bradycardia, heart rate is all within the 30s to 40s, but declined pacemaker placement in the past.  Denies syncopal episode today.  Does not like to use walkers or canes (according to the spouse).  He started complaining of left rib pain and was brought to the ED via ambulance.  Imaging studies revealed large left-sided loculated pleural effusion with old left posterior 8th rib fracture.  Laboratory workup reveals elevated BNP of 1500, creatinine 1.5 (baseline around 1.2 few years ago).  Patient is currently comfortable, complaining of minimal pain.    Admit diagnosis left loculated pleural effusion versus empyema.

## 2020-02-20 NOTE — ED NOTES
RN scanned morphine after speaking with Dr. Blood and as we continued to chat Dr. Blood and before administering morphine per Dr. Blood ordered for RN to not administer the Morphine. Morphine not given and will be returned to [facility]. DANIELLE rosales was notified.    Morphine 4mg was returned in [facility] with DANIELLE Rosales as witness.

## 2020-02-20 NOTE — ASSESSMENT & PLAN NOTE
Currently bradycardic, heart rate in the 30s to 40s.  Patient declined pacemaker in the past.  Last dose of apixaban last night.

## 2020-02-20 NOTE — ED NOTES
RN asked Dr. Blood if pt needed to be on cardiac monitor, and he stated not at this time, no orders given for cardiac monitor at this time. RN will continue to check VS hourly

## 2020-02-20 NOTE — ED NOTES
Pt and pt wife settled in beds and resting with no distress noted.   Pt lying in bed in NAD,VSS,RR equal and unlabored. Bed is low, locked, and call light in reach. Side rails up x 2.

## 2020-02-20 NOTE — PROGRESS NOTES
Clinical Pharmacy Progress Note: Telemetry Pharmacist Rounding     Pharmacist counseled patient on the telemetry pharmacist's availability to discuss new medications, side effects, and reasons for changes in medication during admission.   Asked if patient had any medication questions at this time.   Instructed patient to use call light with any questions or concerns to discuss with pharmacy during the admission.  [employer] translation service was required.  Patient expressed understanding and had no further questions.    Thank you for allowing us to participate in this patient's care.  Sol Ontiveros, PharmLYDIA 02/20/2020 2:28 PM

## 2020-02-20 NOTE — HPI
02/20/2020  The patient is an elderly frail appearing 84 year old male with a left loculated pleural effusion that was worked up in the ED for a witnessed fall onto the left side. The patient has dementia, hypertension, atrial fibrillation, second degree heart block, chronic severe bradycardia, SAMUEL, anxiety, depression. Patient refused surgical intervention with a pacemaker in the past. Patient's resting heart rate is in the low to mid 30s. The patient is confused. He has a flat affect. He is frail. Unsteady gait and balance. Wife reports multiple witnessed falls within the past week. He is currently anticoagulated with apixaban for his atrial fibrillation, last dose yesterday. ED workup revealed BNP 1,542. Productive cough x 1 week. Dyspnea on exertion. Denies chest pain. Denies orthopnea. Patient's wife reports that the severity of the patient's dementia has been progressing rapidly as of late. Denies chest pain, fever, malaise, syncope, dizziness, lightheadedness, tremors, abdominal pain, diarrhea, and edema.

## 2020-02-20 NOTE — PLAN OF CARE
02/20/20 1310   Discharge Assessment   Assessment Type Discharge Planning Assessment   Confirmed/corrected address and phone number on facesheet? Yes   Assessment information obtained from? Patient   Prior to hospitilization cognitive status: Alert/Oriented   Prior to hospitalization functional status: Independent   Current cognitive status: Alert/Oriented   Current Functional Status: Needs Assistance   Lives With spouse   Able to Return to Prior Arrangements yes   Is patient able to care for self after discharge? Yes   Who are your caregiver(s) and their phone number(s)? Rosa Sanchez ([facility]) (wife) [phone] (may be Yoruba speaker)   Patient's perception of discharge disposition home health   Readmission Within the Last 30 Days no previous admission in last 30 days   Patient currently being followed by outpatient case management? No   Patient currently receives any other outside agency services? No   Equipment Currently Used at Home walker, rolling   Do you have any problems affording any of your prescribed medications? No   Is the patient taking medications as prescribed? yes   Does the patient have transportation home? Yes   Transportation Anticipated family or friend will provide   Does the patient receive services at the [facility]? No   Discharge Plan A Home Health   Discharge Plan B Skilled Nursing Facility   DME Needed Upon Discharge  other (see comments)  (TBD)     Met with pt at bedside to perform DC assessment, Mart[employer] translation service was required. Pt lives at home with his wife and owns a RW but does not typically require it. Pt may benefit from HH v SNF pending PT/OT eval. No other CM DC needs identified at this time. SWer provided a transitional care folder, information on advanced directives, information on pharmacy bedside delivery, and discharge planning begins on admission with contact information for any needs/questions. Pt opted for bedside medication delivery. Pt does not know  what his typical pharmacy is. His chart has the below pharmacy on file.    [id number] - [place] - [address]  [address] [address]  Phone: [phone] Fax: [phone]    PCP: MD Oumar Padilla, RITESH 2/20/2020 1:15 PM

## 2020-02-20 NOTE — ASSESSMENT & PLAN NOTE
02/20/2020  The patient is an elderly frail appearing 84 year old male with a left loculated pleural effusion that was worked up in the ED for a witnessed fall onto the left side. The patient has dementia, hypertension, atrial fibrillation, second degree heart block, chronic severe bradycardia, SAMUEL, anxiety, depression. Patient refused surgical intervention with a pacemaker in the past. Patient's resting heart rate is in the low to mid 30s. He is currently anticoagulated with apixaban for his atrial fibrillation, last dose 02/19/2020. Life threatening bradycardia precludes any surgical intervention. Patient and his wife expressed in the past that they do not want surgical intervention. Does not want surgical intervention for his heart block and chronic severe bradycardia. Recommend thoracentesis and evaluation by the pulmonology service.

## 2020-02-20 NOTE — PLAN OF CARE
Attempted to see/examine patient. Josie was utilized as patient is non-English speaking, however his wife had just left with his hearing aids and he was unable to hear or converse with the interpretor. Will try again this afternoon or in AM, when his wife returns.    Reviewed chart. Patient had adamantly REFUSED pacemaker placement on multiple occasions.

## 2020-02-20 NOTE — ED NOTES
RN called lab and spoke to Su in lab and instructed her that there is a early morning lab collect that needs to be drawn. She verbalized understanding.

## 2020-02-20 NOTE — ED NOTES
Pt moved into ED bed 19 with cardiac monitor. Pt is sinus shannan on the monitor, Dr. Blood notified and no new orders given.

## 2020-02-20 NOTE — ED NOTES
"Patient (with Dementia) was attempting to "escape from house" according to son and fell against wheelbarrow resulting in right rib area pain.  "

## 2020-02-20 NOTE — MEDICAL/APP STUDENT
Progress Note  Hospital Medicine    Patient Name: Santos Morales  YOB: 1935    Admit Date: 2/19/2020                     LOS: 0    SUBJECTIVE:     Reason for Admission:  Empyema, left    Mr. Morales is an 83 yo man with PMHx of Afib on Apixaban, symptomatic bradycardia,  CKD3, and dementia presented to ED after a fall on L side. Pt c/o L rib pain. Denies syncopal episode. According to pt's wife, pt's HR is usually in 30-40s but denied pacemaker placement. Does not use walker/cane.     Upon arrival at ED, BNP 1500, creatinine 1.5, CXR revealed L sided loculated pleural effusion with previous L posterior 8th rib fracture.     Interval history:   Pt resting comfortably in bed in no distress.     Review of Systems   Unable to perform ROS: Dementia       OBJECTIVE:     Vital Signs Range (Last 24H):  Temp:  [98 °F (36.7 °C)-98.9 °F (37.2 °C)]   Pulse:  [37-62]   Resp:  [16-20]   BP: (110-179)/(38-90)   SpO2:  [94 %-99 %] Body mass index is 27.19 kg/m².    I & O (Last 24H):    Intake/Output Summary (Last 24 hours) at 2/20/2020 1221  Last data filed at 2/20/2020 0942  Gross per 24 hour   Intake 0 ml   Output 250 ml   Net -250 ml       Weights:   Wt Readings from Last 9 Encounters:   02/20/20 81.1 kg (178 lb 12.7 oz)   01/14/20 84.4 kg (186 lb 1.1 oz)   12/09/19 83.3 kg (183 lb 8.5 oz)   07/10/19 84.9 kg (187 lb 4.5 oz)   07/03/19 84 kg (185 lb 1.2 oz)   05/30/19 86 kg (189 lb 7.8 oz)   05/22/19 85.7 kg (189 lb 0.7 oz)   05/14/19 89.8 kg (197 lb 15.6 oz)   04/18/19 89.5 kg (197 lb 5 oz)   ]    Scheduled Meds:  Continuous Infusions:  PRN Meds:.acetaminophen, albuterol-ipratropium, aluminum-magnesium hydroxide-simethicone, guaifenesin 100 mg/5 ml, hydrALAZINE, morphine, ondansetron    Physical Exam:  Physical Exam   Constitutional: He is oriented to person, place, and time and well-developed, well-nourished, and in no distress.   HENT:   Head: Normocephalic and atraumatic.   Hard of hearing   Eyes: Pupils are  equal, round, and reactive to light.   Neck: Normal range of motion. Neck supple.   Cardiovascular: Regular rhythm, normal heart sounds and intact distal pulses. Bradycardia present.   Pulmonary/Chest: Effort normal. No respiratory distress. He has decreased breath sounds (L > R). He has no wheezes.   Abdominal: Soft. Bowel sounds are normal. He exhibits no distension. There is no tenderness.   Musculoskeletal: Normal range of motion. He exhibits no edema.   Neurological: He is alert and oriented to person, place, and time.   Skin: Skin is warm. No rash noted. No erythema.   Psychiatric: He is agitated.   Impaired cognition. Hx Dementia       Diagnostic Results:  Recent Labs   Lab 02/19/20  2246      K 4.7      CO2 27   BUN 33*   CREATININE 1.5*   CALCIUM 9.1    Recent Labs   Lab 02/19/20  2246   PROT 6.6   ALBUMIN 3.0*   BILITOT 0.4   ALKPHOS 177*   AST 17   ALT 15        Recent Labs   Lab 02/19/20  2246   WBC 6.60   HGB 10.9*   HCT 34.5*      MCV 84   RDW 15.2*    No results for input(s): APTT, INR, PTT in the last 168 hours.       ASSESSMENT/PLAN:     Problem List:    Pleural effusion    85 yo man with PMHx of Afib on Apixaban, symptomatic bradycardia, CKD3, and dementia presented to ED post-fall for L sided rib pain.     -CXR reveals L sided loculated pleural effusion   -Chest CT revealed moderate L and small R pleural effusions with bibasilar atelectasis and LLL infiltrate.- ( may be sec to rib fracture with Hemothorax)  -cardiothoracic surgery recommends thoracentesis and pulm consult   -pulmonology plans for thoracentesis on 2/22, delayed due to Eliquis.  -NPO  -BNP 1542    Symptomatic bradycardia    -Pt presented due to fall but denies LOC  -baseline HR according to pt's wife is in 30-40s, no pacemaker in place      Atrial fibrillation     -discontinue Apixaban for thoracentesis 2/22    Chronic diastolic heart failure    -previous ECHO 2018 revealed EF 60-65% and diastolic dysfunction    -BNP 1542  -resume Arb, Lasix, Spironolactone, and Nitrates    CKD stage 3    -creatinine 1.5 (baseline 1.2)  -trend creatinine     Advanced Dementia  - appears worse- Palliative care consulted. Pt is DNR per wife.     Signed by Medical Student:  Sterling Barnes

## 2020-02-21 PROBLEM — J86.9 EMPYEMA, LEFT: Status: RESOLVED | Noted: 2020-02-20 | Resolved: 2020-02-21

## 2020-02-21 PROBLEM — J86.9 EMPYEMA, LEFT: Status: ACTIVE | Noted: 2020-02-21

## 2020-02-21 LAB
ANION GAP SERPL CALC-SCNC: 6 MMOL/L (ref 8–16)
BASOPHILS # BLD AUTO: 0.05 K/UL (ref 0–0.2)
BASOPHILS NFR BLD: 0.6 % (ref 0–1.9)
BUN SERPL-MCNC: 24 MG/DL (ref 8–23)
CALCIUM SERPL-MCNC: 9 MG/DL (ref 8.7–10.5)
CHLORIDE SERPL-SCNC: 107 MMOL/L (ref 95–110)
CO2 SERPL-SCNC: 25 MMOL/L (ref 23–29)
CREAT SERPL-MCNC: 1 MG/DL (ref 0.5–1.4)
DIFFERENTIAL METHOD: ABNORMAL
EOSINOPHIL # BLD AUTO: 0.1 K/UL (ref 0–0.5)
EOSINOPHIL NFR BLD: 1.8 % (ref 0–8)
ERYTHROCYTE [DISTWIDTH] IN BLOOD BY AUTOMATED COUNT: 14.8 % (ref 11.5–14.5)
EST. GFR  (AFRICAN AMERICAN): >60 ML/MIN/1.73 M^2
EST. GFR  (NON AFRICAN AMERICAN): >60 ML/MIN/1.73 M^2
GLUCOSE SERPL-MCNC: 91 MG/DL (ref 70–110)
HCT VFR BLD AUTO: 36.2 % (ref 40–54)
HGB BLD-MCNC: 11.5 G/DL (ref 14–18)
IMM GRANULOCYTES # BLD AUTO: 0.02 K/UL (ref 0–0.04)
IMM GRANULOCYTES NFR BLD AUTO: 0.3 % (ref 0–0.5)
LYMPHOCYTES # BLD AUTO: 1.5 K/UL (ref 1–4.8)
LYMPHOCYTES NFR BLD: 18.6 % (ref 18–48)
MAGNESIUM SERPL-MCNC: 1.9 MG/DL (ref 1.6–2.6)
MCH RBC QN AUTO: 26.4 PG (ref 27–31)
MCHC RBC AUTO-ENTMCNC: 31.8 G/DL (ref 32–36)
MCV RBC AUTO: 83 FL (ref 82–98)
MONOCYTES # BLD AUTO: 0.7 K/UL (ref 0.3–1)
MONOCYTES NFR BLD: 8.4 % (ref 4–15)
NEUTROPHILS # BLD AUTO: 5.6 K/UL (ref 1.8–7.7)
NEUTROPHILS NFR BLD: 70.3 % (ref 38–73)
NRBC BLD-RTO: 0 /100 WBC
PLATELET # BLD AUTO: 236 K/UL (ref 150–350)
PMV BLD AUTO: 10.2 FL (ref 9.2–12.9)
POTASSIUM SERPL-SCNC: 4.5 MMOL/L (ref 3.5–5.1)
RBC # BLD AUTO: 4.36 M/UL (ref 4.6–6.2)
SODIUM SERPL-SCNC: 138 MMOL/L (ref 136–145)
WBC # BLD AUTO: 7.96 K/UL (ref 3.9–12.7)

## 2020-02-21 PROCEDURE — 63600175 PHARM REV CODE 636 W HCPCS: Performed by: INTERNAL MEDICINE

## 2020-02-21 PROCEDURE — 25000003 PHARM REV CODE 250: Performed by: INTERNAL MEDICINE

## 2020-02-21 PROCEDURE — 99244 PR OFFICE CONSULTATION,LEVEL IV: ICD-10-PCS | Mod: ,,, | Performed by: INTERNAL MEDICINE

## 2020-02-21 PROCEDURE — 85025 COMPLETE CBC W/AUTO DIFF WBC: CPT

## 2020-02-21 PROCEDURE — 99214 PR OFFICE/OUTPT VISIT, EST, LEVL IV, 30-39 MIN: ICD-10-PCS | Mod: ,,, | Performed by: INTERNAL MEDICINE

## 2020-02-21 PROCEDURE — 36415 COLL VENOUS BLD VENIPUNCTURE: CPT

## 2020-02-21 PROCEDURE — 83735 ASSAY OF MAGNESIUM: CPT

## 2020-02-21 PROCEDURE — 25000003 PHARM REV CODE 250: Performed by: NURSE PRACTITIONER

## 2020-02-21 PROCEDURE — 21400001 HC TELEMETRY ROOM

## 2020-02-21 PROCEDURE — 80048 BASIC METABOLIC PNL TOTAL CA: CPT

## 2020-02-21 PROCEDURE — 99244 OFF/OP CNSLTJ NEW/EST MOD 40: CPT | Mod: ,,, | Performed by: INTERNAL MEDICINE

## 2020-02-21 PROCEDURE — 99214 OFFICE O/P EST MOD 30 MIN: CPT | Mod: ,,, | Performed by: INTERNAL MEDICINE

## 2020-02-21 RX ORDER — CLONAZEPAM 0.5 MG/1
0.5 TABLET ORAL NIGHTLY PRN
Status: DISCONTINUED | OUTPATIENT
Start: 2020-02-21 | End: 2020-02-23 | Stop reason: HOSPADM

## 2020-02-21 RX ORDER — SERTRALINE HYDROCHLORIDE 50 MG/1
150 TABLET, FILM COATED ORAL DAILY
Status: DISCONTINUED | OUTPATIENT
Start: 2020-02-21 | End: 2020-02-23 | Stop reason: HOSPADM

## 2020-02-21 RX ADMIN — TRAZODONE HYDROCHLORIDE 150 MG: 100 TABLET ORAL at 10:02

## 2020-02-21 RX ADMIN — GUAIFENESIN 200 MG: 200 SOLUTION ORAL at 10:02

## 2020-02-21 RX ADMIN — CLONAZEPAM 0.5 MG: 0.5 TABLET ORAL at 12:02

## 2020-02-21 RX ADMIN — CLONAZEPAM 0.5 MG: 0.5 TABLET ORAL at 10:02

## 2020-02-21 RX ADMIN — MORPHINE SULFATE 2 MG: 4 INJECTION INTRAVENOUS at 05:02

## 2020-02-21 RX ADMIN — ACETAMINOPHEN 650 MG: 325 TABLET ORAL at 07:02

## 2020-02-21 RX ADMIN — SERTRALINE HYDROCHLORIDE 150 MG: 50 TABLET ORAL at 12:02

## 2020-02-21 NOTE — SUBJECTIVE & OBJECTIVE
Interval History:    2/21 pt seen and examined , wife at bedside . Dementia > 1 year . Does some ADL's at home . + fall , ribs fx , left sdie effusion     Review of Systems   Unable to perform ROS: Dementia         Objective:     Vital Signs (Most Recent):  Temp: 98 °F (36.7 °C) (02/21/20 1115)  Pulse: (!) 46 (02/21/20 1115)  Resp: 20 (02/21/20 1115)  BP: (!) 140/44 (02/21/20 1115)  SpO2: (!) 94 % (02/21/20 1115) Vital Signs (24h Range):  Temp:  [97.5 °F (36.4 °C)-98 °F (36.7 °C)] 98 °F (36.7 °C)  Pulse:  [41-54] 46  Resp:  [18-20] 20  SpO2:  [94 %-95 %] 94 %  BP: (140-160)/(42-58) 140/44     Weight: 78.7 kg (173 lb 8 oz)  Body mass index is 26.38 kg/m².      Intake/Output Summary (Last 24 hours) at 2/21/2020 1120  Last data filed at 2/21/2020 0400  Gross per 24 hour   Intake 480 ml   Output --   Net 480 ml       Physical Exam   Constitutional: He appears well-developed and well-nourished. No distress.   HENT:   Head: Normocephalic and atraumatic.   Hard of hearing.   Eyes: EOM are normal.   Neck: Neck supple.   Cardiovascular:   Bradycardic irregular S1-S2   Pulmonary/Chest: Effort normal. No respiratory distress.   Breath sounds decreased at bases  Decreased on the left more than the right.   Abdominal: Soft. There is no tenderness.   Musculoskeletal: He exhibits no edema or deformity.   Neurological: He is alert.   Confused, follows commands at times.   Psychiatric:   Dementia    Nursing note and vitals reviewed.      Vents:       Lines/Drains/Airways     Peripheral Intravenous Line                 Peripheral IV - Single Lumen 02/19/20 2247 20 G Right Antecubital 1 day                Significant Labs:    CBC/Anemia Profile:  Recent Labs   Lab 02/19/20  2246 02/21/20  0450   WBC 6.60 7.96   HGB 10.9* 11.5*   HCT 34.5* 36.2*    236   MCV 84 83   RDW 15.2* 14.8*        Chemistries:  Recent Labs   Lab 02/19/20  2246 02/21/20  0450    138   K 4.7 4.5    107   CO2 27 25   BUN 33* 24*   CREATININE  1.5* 1.0   CALCIUM 9.1 9.0   ALBUMIN 3.0*  --    PROT 6.6  --    BILITOT 0.4  --    ALKPHOS 177*  --    ALT 15  --    AST 17  --    MG  --  1.9            Significant Imaging:  CT: I have reviewed all pertinent results/findings within the past 24 hours and my personal findings are:  Airways: Patent.     Lungs: Compressive atelectasis suspected within the lower lobes bilaterally.  Left lower lobe infiltrate also suspected.    Pleura: Moderate left-sided pleural effusion with compressive atelectasis.  Small right-sided pleural effusion with compressive atelectasis.No pleural calcification.    Renuka/Mediastinum: Shotty stone enlargement.    Esophagus: Normal.    Heart/pericardium: Cardiomegaly.  Trace pericardial effusion.  Mild coronary disease.    Pulmonary vasculature: Pulmonary arteries distribute normally.  There are four pulmonary veins.    Aorta: Left-sided aortic arch with 3 arterial branches.  The aorta maintains normal caliber, contour and course. There is mild calcification of the thoracic aorta.  There is  mild coronary artery calcification.

## 2020-02-21 NOTE — HPI
Mr. Morales is an 84 year old male patient whose current medical conditions include afib (on Eliquis), dementia, bradycardia, Mobitz type I AV block who presented to [facility] ED on 2/19/2020 s/p fall. Patient reportedly lost his balance and landed on his left side. Post-fall, he complained of left rip pain. Initial workup in ED revealed BNP of 1500 and creatinine of 1.5. Xray showed large left-sided loculated pleural effusion with old left posterior 8th rib fracture and patient was subsequently admitted for further evaluation and treatment. Cardiology consulted to assist with management given low HR/persistent bradycardia. Patient seen and examined today. Remains confused/disoriented. Appears comfortable. Wife present during exam and again states patient has repeatedly declined PPM placement in past. Wife wishes to abide by patient's request, not willing to proceed with any invasive procedure/PPM placement. Risks/benefits discussed.

## 2020-02-21 NOTE — PLAN OF CARE
02/21/20 1056   Post-Acute Status   Post-Acute Authorization Placement   Post-Acute Placement Status Set-up Complete   Patient choice form signed by patient/caregiver List from [employer] Compare   Discharge Delays None known at this time   Discharge Plan   Discharge Plan A [facility]  Oumar Dumont LMSW 2/21/2020 10:57 AM

## 2020-02-21 NOTE — CHAPLAIN
Initial visit with patient and family.  Took time to assess for any spiritual needs for pt and his family using [employer] language services.  Pt and his family currently had no spiritual needs and I will follow up as needed.    Chaplain Meek Kendall M.Div., [facility]

## 2020-02-21 NOTE — CONSULTS
[facility]  Cardiology  Consult Note    Patient Name: Santos Morales  MRN: [id number]  Admission Date: 2/19/2020  Hospital Length of Stay: 0 days  Code Status: DNR   Attending Provider: Traci Marshall MD   Consulting Provider: Halina White PA-C  Primary Care Physician: Diane Bowen MD  Principal Problem:Empyema, left    Patient information was obtained from patient, past medical records and ER records.     Inpatient consult to Cardiology  Consult performed by: Halina White PA-C  Consult ordered by: Traci Marshall MD        Subjective:     Chief Complaint:  Left rib pain    HPI:   Mr. Morales is an 84 year old male patient whose current medical conditions include afib (on Eliquis), dementia, bradycardia, Mobitz type I AV block who presented to [facility] ED on 2/19/2020 s/p fall. Patient reportedly lost his balance and landed on his left side. Post-fall, he complained of left rip pain. Initial workup in ED revealed BNP of 1500 and creatinine of 1.5. Xray showed large left-sided loculated pleural effusion with old left posterior 8th rib fracture and patient was subsequently admitted for further evaluation and treatment. Cardiology consulted to assist with management given low HR/persistent bradycardia. Patient seen and examined today. Remains confused/disoriented. Appears comfortable. Wife present during exam and again states patient has repeatedly declined PPM placement in past. Wife wishes to abide by patient's request, not willing to proceed with any invasive procedure/PPM placement. Risks/benefits discussed.     Past Medical History:   Diagnosis Date    Anxiety     Atrial fibrillation     Chronic congestive heart failure 2/20/2020    Dementia 02/19/2020    Depression     Hypertension     Insomnia     Mobitz type 1 second degree AV block     followed by Dr. Hardy, Cardiology    SAMUEL (obstructive sleep apnea)        Past Surgical History:   Procedure Laterality Date     BACK SURGERY      CATARACT EXTRACTION W/  INTRAOCULAR LENS IMPLANT Bilateral        Review of patient's allergies indicates:  No Known Allergies    No current facility-administered medications on file prior to encounter.      Current Outpatient Medications on File Prior to Encounter   Medication Sig    apixaban (ELIQUIS) 5 mg Tab Take 1 tablet (5 mg total) by mouth 2 (two) times daily.    clonazePAM (KLONOPIN) 0.5 MG tablet Take 1 tablet (0.5 mg total) by mouth every evening.    isosorbide mononitrate (IMDUR) 60 MG 24 hr tablet Take 1 tablet (60 mg total) by mouth once daily.    melatonin 1 mg Tab Take 1 tablet by mouth every evening.    sertraline (ZOLOFT) 100 MG tablet Take 1 tablet (100 mg total) by mouth once daily.    sertraline (ZOLOFT) 50 MG tablet TAKE 1 TABLET BY MOUTH ONCE DAILY FOR A TOTAL OF 150MG PER DAY.    spironolactone (ALDACTONE) 25 MG tablet TAKE 1 TABLET BY MOUTH ONCE DAILY    traZODone (DESYREL) 150 MG tablet TAKE ONE TABLET BY MOUTH ONCE DAILY IN THE EVENING    candesartan (ATACAND) 8 MG tablet Take 2 tablets (16 mg total) by mouth once daily.    furosemide (LASIX) 40 MG tablet Take 1 tablet (40 mg total) by mouth daily as needed. Take next 3 days     Family History     Problem Relation (Age of Onset)    Cancer Father    Diabetes Paternal Grandmother        Tobacco Use    Smoking status: Never Smoker    Smokeless tobacco: Never Used   Substance and Sexual Activity    Alcohol use: Yes     Comment: socially     Drug use: No    Sexual activity: Not on file     Review of Systems   Unable to perform ROS: dementia     Objective:     Vital Signs (Most Recent):  Temp: 97.9 °F (36.6 °C) (02/21/20 0713)  Pulse: (!) 46 (02/21/20 0741)  Resp: 19 (02/21/20 0713)  BP: (!) 150/42 (02/21/20 0713)  SpO2: (!) 94 % (02/21/20 0713) Vital Signs (24h Range):  Temp:  [97.5 °F (36.4 °C)-98 °F (36.7 °C)] 97.9 °F (36.6 °C)  Pulse:  [41-54] 46  Resp:  [18-19] 19  SpO2:  [94 %-95 %] 94 %  BP:  (148-160)/(42-58) 150/42     Weight: 78.7 kg (173 lb 8 oz)  Body mass index is 26.38 kg/m².    SpO2: (!) 94 %  O2 Device (Oxygen Therapy): room air      Intake/Output Summary (Last 24 hours) at 2/21/2020 1044  Last data filed at 2/21/2020 0400  Gross per 24 hour   Intake 480 ml   Output --   Net 480 ml       Lines/Drains/Airways     Peripheral Intravenous Line                 Peripheral IV - Single Lumen 02/19/20 2247 20 G Right Antecubital 1 day                Physical Exam   Constitutional: He appears well-developed and well-nourished. No distress.   HENT:   Head: Normocephalic and atraumatic.   Eyes: Pupils are equal, round, and reactive to light. Right eye exhibits no discharge. Left eye exhibits no discharge.   Neck: Neck supple. No JVD present.   Cardiovascular: S1 normal and S2 normal. An irregularly irregular rhythm present. Bradycardia present.   Murmur heard.  Pulmonary/Chest: Effort normal. No respiratory distress. He has no wheezes. He has no rales.   Diminished BS L > R   Abdominal: Soft. He exhibits no distension.   Musculoskeletal: He exhibits no edema.   Neurological: He is alert.   Confused     Skin: Skin is warm and dry. He is not diaphoretic. No erythema.   Psychiatric: He has a normal mood and affect.   Nursing note and vitals reviewed.      Significant Labs:   CMP   Recent Labs   Lab 02/19/20 2246 02/21/20  0450    138   K 4.7 4.5    107   CO2 27 25    91   BUN 33* 24*   CREATININE 1.5* 1.0   CALCIUM 9.1 9.0   PROT 6.6  --    ALBUMIN 3.0*  --    BILITOT 0.4  --    ALKPHOS 177*  --    AST 17  --    ALT 15  --    ANIONGAP 5* 6*   ESTGFRAFRICA 49* >60   EGFRNONAA 42* >60   , CBC   Recent Labs   Lab 02/19/20 2246 02/21/20  0450   WBC 6.60 7.96   HGB 10.9* 11.5*   HCT 34.5* 36.2*    236   , Troponin No results for input(s): TROPONINI in the last 48 hours. and All pertinent lab results from the last 24 hours have been reviewed.    Significant Imaging: Echocardiogram:   2D  echo with color flow doppler:   Results for orders placed or performed in visit on 11/15/18   2D Echo w/ Color Flow Doppler   Result Value Ref Range    QEF 60 55 - 65    Mitral Valve Regurgitation TRIVIAL     Aortic Valve Regurgitation MILD TO MODERATE (A)     Est. PA Systolic Pressure 28.79     Narrative    Date of Procedure: 11/15/2018        TEST DESCRIPTION   Technical Quality: This is a technically challenging study.     General: The patient was bradycardic throughout the study.    Aorta: The aortic root is moderately enlarged, measuring 3.2 cm at sinotubular junction and 4.9 cm at Sinuses of Valsalva. The proximal ascending aorta is normal in size, measuring 3.5 cm across.     Left Atrium: The left atrial volume index is moderately enlarged, measuring 44.25 cc/m2.     Left Ventricle: The left ventricle is normal in size, with an end-diastolic diameter of 5.3 cm, and an end-systolic diameter of 3.9 cm. LV wall thickness is normal, with the septum measuring 1.5 cm and the posterior wall measuring 1.4 cm across. Relative   wall thickness was increased at 0.53, and the LV mass index was increased at 193.8 g/m2 consistent with concentric left ventricular hypertrophy. There are no regional wall motion abnormalities. Left ventricular systolic function appears normal. Visually   estimated ejection fraction is 60-65%. The LV Doppler derived stroke volume equals 141.0 ccs.     Diastolic indices: Diastolic function is indeterminate.     Right Atrium: The right atrium is enlarged, measuring 7.0 cm in length and 3.6 cm in width in the apical view.     Right Ventricle: The right ventricle is normal in size measuring 1.9 cm at the base in the apical right ventricle-focused view. Global right ventricular systolic function appears normal. The estimated PA systolic pressure is 29 mmHg.     Aortic Valve:  The aortic valve is normal in structure. The peak velocity obtained across the aortic valve is 2.01 m/s, which translates to  a peak gradient of 16 mmHg. The mean gradient is 7 mmHg. Using a left ventricular outflow tract diameter of 2.3   cm, a left ventricular outflow tract velocity time integral of 35 cm, and a peak instantaneous transvalvular velocity time integral of 45 cm, the calculated aortic valve area is 3.14 cm2(AVAi is 1.5 cm2/m2). Additionally, there is mild to moderate aortic   regurgitation.     Mitral Valve:  The mitral valve is normal in structure. There is trivial mitral regurgitation.     Tricuspid Valve:  The tricuspid valve is normal in structure.     Pulmonary Valve:  The pulmonic valve is not well seen.     IVC: IVC is enlarged but collapses > 50% with a sniff, suggesting intermediate right atrial pressure of 8 mmHg.     Intracavitary: There is no evidence of pericardial effusion, intracavity mass, thrombi, or vegetation.         CONCLUSIONS     1 - Moderately enlarged aortic root.     2 - Biatrial enlargement.     3 - Concentric hypertrophy.     4 - No wall motion abnormalities.     5 - Normal left ventricular systolic function (EF 60-65%).     6 - Indeterminate LV diastolic function.     7 - Normal right ventricular systolic function .     8 - The estimated PA systolic pressure is 29 mmHg.     9 - Mild to moderate aortic regurgitation.     10 - Intermediate central venous pressure.             This document has been electronically    SIGNED BY: Ari Block MD On: 11/15/2018 18:02   , EKG: Reviewed and X-Ray: CXR: X-Ray Chest 1 View (CXR):   Results for orders placed or performed during the hospital encounter of 02/19/20   X-Ray Chest 1 View    Narrative    EXAMINATION:  XR CHEST 1 VIEW; XR RIBS 2 VIEW RIGHT    CLINICAL HISTORY:  Unspecified fall, initial encounter    COMPARISON:  February 9, 2019    FINDINGS:  There is been interval development of a moderate to large laterally positioned left pleural effusion versus marked pleural thickening.  Adjacent lateral and inferior left lung atelectasis.  Stable scarring or  chronic atelectasis in the right lung base.  Stable blunting of the right lateral costophrenic angle.  The lungs are otherwise clear.  The cardiac silhouette size is enlarged.  The trachea is midline and the mediastinal width is normal. Negative for pneumothorax.  Pulmonary vasculature is normal. Negative for osseous abnormalities. Ectatic and tortuous aorta with calcifications of the aortic knob.  Degenerative changes of the spine and both shoulder girdles with convex-right curvature.  Postoperative changes involve the right clavicle.    There is an old fracture involving the lateral left 8th rib.  Negative for definite acute rib fracture.      Impression    1.  Interval development of a large laterally positioned, possibly loculated left pleural effusion with adjacent atelectasis/consolidation in the mid and lower left lung.  Differential considerations would include pneumonia with a loculated parapneumonic effusion or empyema, or primary effusion that is loculated laterally of unclear etiology.  No definite underlying rib fracture is seen.  Negative for pneumothorax.    2.  Negative for acute process otherwise.    3.  Stable findings as noted above.      Electronically signed by: Orlin Hernandez MD  Date:    02/19/2020  Time:    22:15    and X-Ray Chest PA and Lateral (CXR): No results found for this visit on 02/19/20.    Assessment and Plan:   Patient who presents with empyema and known persistent bradycardia. Still refuses PPM placement (wife present and in agreement). Continue current medical mgmt/conservative therapy.     * Empyema, left  -Mgmt as per primary team    Chronic congestive heart failure  -Stable  -Continue same mgmt    Atrial fibrillation, persistent  -HR remains slow, known persistent bradycardia  -Avoid AV stone blocking agents  -Eliquis on hold for pending thoracentesis, resume post-procedure ASAP. Counseled on fall prevention/using walker    Symptomatic bradycardia  -Patient with known  persistent bradycardia  -Has refused PPM placement in the past; wife present during exam and again refuses PPM placement given patient's co-morbidities/underlying dementia  -Risks/benefits discussed    CKD (chronic kidney disease) stage 3, GFR 30-59 ml/min  -Monitor    Essential hypertension  -Continue home meds        VTE Risk Mitigation (From admission, onward)         Ordered     Place sequential compression device  Until discontinued      02/20/20 0869                Thank you for your consult. I will follow-up with patient. Please contact us if you have any additional questions.    Halina White PA-C  Cardiology   [facility]

## 2020-02-21 NOTE — ASSESSMENT & PLAN NOTE
-Patient with known persistent bradycardia  -Has refused PPM placement in the past; wife present during exam and again refuses PPM placement given patient's co-morbidities/underlying dementia  -Risks/benefits discussed

## 2020-02-21 NOTE — PLAN OF CARE
Pt confused. Daughter at bedside. VSS.  Pt remained afebrile throughout this shift.   All meds administered per order.   Pt remained free of falls this shift.   Pt restless and unable to sleep. Home meds of trazadone, klonipin, and zoloft restarted and given. Pt restlessness eased, but still did not sleep well.  Plan of care reviewed. Patient verbalizes understanding.   Pt moving/turning independently.   Patient afib on monitor with HR in 40's.   Bed low, side rails up x 2, wheels locked, bed alarm on, call light in reach.   Patient/family instructed to call for assistance.   Hourly rounding completed.   Will continue to monitor

## 2020-02-21 NOTE — CONSULTS
Advance Care Planning    Consult Note  Palliative Care      Consult Requested By: Dr. Morocho  Reason for Consult: Goals of care        SUBJECTIVE:     History of Present Illness:  Santos Morales is a 84 y.o. year old with a history of dementia, bradycardic, and atrial fibrillation anticoagulated with Eliquis who presented to the emergency department complaining of rib pain. Imaging revealed bilateral pleural effusions (left > right), left lower lobe infiltrate, age-indeterminate compression deformity of L1, and nondisplaced fracture of the right 8th rib. Laboratory workup revealed elevated BNP and he was admitted to further investigate the loculated effusion vs empyema. Palliative Care was consulted to discuss goals of care. Per chart review, Mr. Morales has a known history of bradycardia but has refused pacemaker placement in the past. I met Mr. Morales with his niece at bedside and neither spoke English so the history was obtained by chart review and family. Ms. Morales returned later and I offered to use the MARTII system but she declined. I explained my role on the medical team and my understanding of his hospitalization thus far. She explains progressive dementia that has advanced to the point that he wanders and she can no longer leave him alone. They do not have any children together and the children they have separately all live out of state or are  but one however he has a disability and is not able to help. Her niece comes over to help often but does not live with them. She tells me that she understands he has bradycardia and that cardiology has been consulted but she has no intention of accepting a pacemaker. I explained the progressive nature of dementia coupled with cardiac instability and the option of redirecting care to comfort focused at discharge. She agrees that she wishes for him to be comfortable but had not thought about what that would entail. I explained the benefits and philosophy of  hospice to which she says she is aware as her mother, aunt, and friends have used hospice. She says she does appreciate the service but does not feel that he is to the point of requiring hospice. I explained the benefits of early hospice enrollment and the benefit of home based care since leaving the house and receiving additional support are her biggest concerns. She would be interested in a hospice information visit and would like his daughter to be present. She will talk to his daughter this evening then I will follow up tomorrow to facilitate the meeting. She would like to use [facility] based on previous experiences. We then discussed code status options thoroughly. She says that he would never want to be intubated. I explained the limited benefit of cardiac resuscitation without pulmonary support and would recommend DNR/ DNI. She agreed with this recommendation and I confirmed with her multiple times that this would mean allowing a peaceful death in the event of cardiac arrest and the interventions that are not offered with this code status. She agreed each time and reiterated that she wishes for him to be DNR/ DNI. I also explained the benefits of a LaPOST and provided her a copy in Czech for her to review this evening. In order to complete a LaPOST it must be in English to allow the emergency personnel to be able to read the document and she understood. Mr. Morales waved goodbye but did not interact with me during the exam or follow the conversation. He continued to pick at the air and attempt to get out of bed.          Past Medical History:   Diagnosis Date    Anxiety     Atrial fibrillation     Chronic congestive heart failure 2/20/2020    Dementia 02/19/2020    Depression     Hypertension     Insomnia     Mobitz type 1 second degree AV block     followed by Dr. Hardy, Cardiology    SAMUEL (obstructive sleep apnea)      Past Surgical History:   Procedure Laterality Date    BACK SURGERY       CATARACT EXTRACTION W/  INTRAOCULAR LENS IMPLANT Bilateral      Family History   Problem Relation Age of Onset    Cancer Father         Stomach Ca     Diabetes Paternal Grandmother      Social History     Socioeconomic History    Marital status:      Spouse name: Not on file    Number of children: Not on file    Years of education: Not on file    Highest education level: Not on file   Occupational History    Not on file   Social Needs    Financial resource strain: Not on file    Food insecurity:     Worry: Not on file     Inability: Not on file    Transportation needs:     Medical: Not on file     Non-medical: Not on file   Tobacco Use    Smoking status: Never Smoker    Smokeless tobacco: Never Used   Substance and Sexual Activity    Alcohol use: Yes     Comment: socially     Drug use: No    Sexual activity: Not on file   Lifestyle    Physical activity:     Days per week: Not on file     Minutes per session: Not on file    Stress: Not on file   Relationships    Social connections:     Talks on phone: Not on file     Gets together: Not on file     Attends Restorationism service: Not on file     Active member of club or organization: Not on file     Attends meetings of clubs or organizations: Not on file     Relationship status: Not on file   Other Topics Concern    Not on file   Social History Narrative    Not on file      Review of patient's allergies indicates:  No Known Allergies    Medications:    Current Facility-Administered Medications:     acetaminophen tablet 650 mg, 650 mg, Oral, Q6H PRN, Raffi Camara MD    albuterol-ipratropium 2.5 mg-0.5 mg/3 mL nebulizer solution 3 mL, 3 mL, Nebulization, Q4H PRN, Raffi Camara MD    aluminum-magnesium hydroxide-simethicone 200-200-20 mg/5 mL suspension 30 mL, 30 mL, Oral, Q6H PRN, Raffi Camara MD    guaifenesin 100 mg/5 ml syrup 200 mg, 200 mg, Oral, Q4H PRN, Raffi Camara MD    hydrALAZINE injection 10 mg, 10 mg, Intravenous, Q8H PRN,  Raffi Camara MD    morphine injection 2 mg, 2 mg, Intravenous, Q4H PRN, Raffi Camara MD    ondansetron injection 4 mg, 4 mg, Intravenous, Q8H PRN, Raffi Camara MD        OBJECTIVE:   Symptom Assessment (ESAS 0-10 scale) unable to obtain, dementia    ESAS 0 1 2 3 4 5 6 7 8 9 10   Pain              Dyspnea              Anxiety              Nausea              Depression               Anorexia              Fatigue              Insomnia              Restlessness               Agitation                  Performance Status: PPS Score: 60    ROS:  Review of Systems   Unable to perform ROS: Dementia       Physical Exam:  Vitals: Temp: 97.5 °F (36.4 °C) (02/20/20 1705)  Pulse: (!) 43 (02/20/20 1705)  Resp: 18 (02/20/20 0758)  BP: (!) 148/58(Left arm (manually)) (02/20/20 1705)  SpO2: 95 % (02/20/20 1705)    Gen: well-developed, well-nourished, restless  Head: atraumatic, normocephalic  Eyes: conjuctiva and sclera clear  Ears: hearing grossly intact with no external abnormality  Mouth: good dentition, MM moist  Respiratory: CTAB, no wheezing or rhonchi  Heart: RRR  Abdomen: soft, nondistended, normoactive BS  Pulses: 2+ in DP  Extremities: no edema, full ROM of all joints  Neurologic: normal coordination  Skin: no rashes or lesions  Psych: restless, does not follow commands, no aggressive behavior     Labs:  CBC:   WBC   Date Value Ref Range Status   02/19/2020 6.60 3.90 - 12.70 K/uL Final     Hemoglobin   Date Value Ref Range Status   02/19/2020 10.9 (L) 14.0 - 18.0 g/dL Final     Hematocrit   Date Value Ref Range Status   02/19/2020 34.5 (L) 40.0 - 54.0 % Final     Mean Corpuscular Volume   Date Value Ref Range Status   02/19/2020 84 82 - 98 fL Final     Platelets   Date Value Ref Range Status   02/19/2020 252 150 - 350 K/uL Final       BMP  Lab Results   Component Value Date     02/19/2020    K 4.7 02/19/2020     02/19/2020    CO2 27 02/19/2020    BUN 33 (H) 02/19/2020    CREATININE 1.5 (H) 02/19/2020     CALCIUM 9.1 02/19/2020    ANIONGAP 5 (L) 02/19/2020    ESTGFRAFRICA 49 (A) 02/19/2020    EGFRNONAA 42 (A) 02/19/2020       LFT:   Lab Results   Component Value Date    AST 17 02/19/2020    ALKPHOS 177 (H) 02/19/2020    BILITOT 0.4 02/19/2020       Albumin:   Albumin   Date Value Ref Range Status   02/19/2020 3.0 (L) 3.5 - 5.2 g/dL Final       Radiology:I have reviewed all pertinent imaging results/findings within the past 24 hours.  - CT chest 2/19/2020:  Moderate left and small right pleural effusions with bibasilar atelectasis and left lower lobe infiltrate.    Age-indeterminate compression deformity at L1. Nondisplaced fracture seen anterior laterally within the right 8th rib.    ASSESSMENT   Santos Morales is a 84 y.o. year old with a history of dementia, bradycardic, and atrial fibrillation anticoagulated with Eliquis who presented to the emergency department complaining of rib pain. Imaging revealed bilateral pleural effusions (left > right), left lower lobe infiltrate, age-indeterminate compression deformity of L1, and nondisplaced fracture of the right 8th rib. Laboratory workup revealed elevated BNP and he was admitted to further investigate the loculated effusion vs empyema. Palliative Care was consulted to discuss goals of care.     PLAN   1. Encounter for Palliative Care  - Code status: DNR/ DNI- changed today  - Surrogate: wife Rosa Sanchez  - Discussed the benefit of early hospice enrollment in light of his advanced dementia and bradycardia without intention to intervene with pacemaker placement. Rosa feels that hospice enrollment is premature at this time but is open to their services when time is shorter. She would like an information visit with [facility] tomorrow when his daughter can be present.    2. Bradycardia  - Wife is adamant that he not receive a pacemaker accepting the risk of death  - Cardiology consulted    3. Loculated pleural effusion  - Defer to pulmonology    4.  Dementia, FAST 6  - Rosa would like more help in the home and her ultimate goal is comfort. Early hospice enrollment is preferred to receive the maximal benefit. At the very least home health might be helpful at discharge. I can also discuss home based Palliative Care services as a bridge to hospice.      Discussed case and visit details with Dr. Marshall.    Thank you for allowing Palliative care to be involved in the care of Santos Morales.         Medical decision making: HIGH based on high risk of death, untreated symptoms, high risk medications, poor prognosis, non-English speaking.    25 min ACP time spent: discussing goals of care, coordination of care and emotional support, formulating and communicating prognosis and goals of care, exploring burden/ benefit of various approaches of treatment, code status discussion.      Jaycee Dubose PA-C  Palliative Care

## 2020-02-21 NOTE — MEDICAL/APP STUDENT
Progress Note  Hospital Medicine    Patient Name: Santos Morales  YOB: 1935    Admit Date: 2/19/2020                     LOS: 0    SUBJECTIVE:     Reason for Admission:  Empyema, left    HPI:  Mr. Morales is an 83 yo man with PMHx of Afib on Apixaban, symptomatic bradycardia,  CKD3, and dementia presented to ED after a fall on R side. Pt c/o R rib pain. Denies syncopal episode. According to pt's wife, pt's HR is usually in 30-40s but denied pacemaker placement. Does not use walker/cane.      Upon arrival at ED, BNP 1500, creatinine 1.5, CXR revealed L sided loculated pleural effusion with previous L posterior 8th rib fracture.      Hospital Course:  CT chest revealed moderate L and small R pleural effusions with bibasilar atelectasis and LLL infiltrate. R 8th rib nondisplaced fracture noted. Pulmonology consulted and recommends thoracentesis 2/22. Eliquis held.     2/21 - pt c/o R rib pain, he is now off Eliquis for two days, await Thoracentesis in am by Dr. Morocho. Pt's wife would like home health, not interested in hospice.      Review of Systems   Constitutional: Negative for chills, diaphoresis and fever.   HENT: Negative for congestion, ear pain, sinus pain and sore throat.    Eyes: Negative for blurred vision, double vision and pain.   Respiratory: Negative for cough, shortness of breath and wheezing.    Cardiovascular: Negative for chest pain, palpitations and leg swelling.   Gastrointestinal: Negative for abdominal pain, nausea and vomiting.   Genitourinary: Negative for dysuria, frequency and urgency.   Musculoskeletal: Negative for back pain, joint pain and myalgias.        R rib pain   Skin: Negative for itching and rash.   Neurological: Negative for dizziness, weakness and headaches.         OBJECTIVE:     Vital Signs Range (Last 24H):  Temp:  [97.5 °F (36.4 °C)-98.1 °F (36.7 °C)]   Pulse:  [37-54]   Resp:  [18-19]   BP: (140-160)/(38-58)   SpO2:  [94 %-95 %] Body mass index is 26.38  kg/m².    I & O (Last 24H):    Intake/Output Summary (Last 24 hours) at 2/21/2020 0757  Last data filed at 2/21/2020 0400  Gross per 24 hour   Intake 480 ml   Output 250 ml   Net 230 ml       Weights:   Wt Readings from Last 9 Encounters:   02/21/20 78.7 kg (173 lb 8 oz)   01/14/20 84.4 kg (186 lb 1.1 oz)   12/09/19 83.3 kg (183 lb 8.5 oz)   07/10/19 84.9 kg (187 lb 4.5 oz)   07/03/19 84 kg (185 lb 1.2 oz)   05/30/19 86 kg (189 lb 7.8 oz)   05/22/19 85.7 kg (189 lb 0.7 oz)   05/14/19 89.8 kg (197 lb 15.6 oz)   04/18/19 89.5 kg (197 lb 5 oz)   ]    Scheduled Meds:   sertraline  150 mg Oral Daily     Continuous Infusions:  PRN Meds:.acetaminophen, albuterol-ipratropium, aluminum-magnesium hydroxide-simethicone, clonazePAM, guaifenesin 100 mg/5 ml, hydrALAZINE, morphine, ondansetron, traZODone    Physical Exam:  Physical Exam   Constitutional: He is oriented to person, place, and time and well-developed, well-nourished, and in no distress.   HENT:   Head: Normocephalic and atraumatic.   Hard of hearing   Eyes: Pupils are equal, round, and reactive to light.   Neck: Normal range of motion. Neck supple.   Cardiovascular: Regular rhythm, normal heart sounds and intact distal pulses. Bradycardia present.   Pulmonary/Chest: Effort normal and breath sounds normal. No respiratory distress. He has no wheezes.   Abdominal: Soft. Bowel sounds are normal. He exhibits no distension. There is no tenderness.   Musculoskeletal: Normal range of motion. He exhibits no edema.   Neurological: He is alert and oriented to person, place, and time.   Skin: Skin is warm. No rash noted. No erythema.   Psychiatric:   Hx dementia       Diagnostic Results:  Recent Labs   Lab 02/19/20  2246 02/21/20  0450    138   K 4.7 4.5    107   CO2 27 25   BUN 33* 24*   CREATININE 1.5* 1.0   CALCIUM 9.1 9.0   MG  --  1.9    Recent Labs   Lab 02/19/20  2246   PROT 6.6   ALBUMIN 3.0*   BILITOT 0.4   ALKPHOS 177*   AST 17   ALT 15        Recent Labs    Lab 02/19/20  2246 02/21/20  0450   WBC 6.60 7.96   HGB 10.9* 11.5*   HCT 34.5* 36.2*    236   MCV 84 83   RDW 15.2* 14.8*           ASSESSMENT/PLAN:     Problem List:     Pleural effusion     85 yo man with PMHx of Afib on Apixaban, symptomatic bradycardia, CKD3, and dementia presented to ED post-fall for R sided rib pain.      -CXR reveals L sided loculated pleural effusion   -Chest CT revealed moderate L and small R pleural effusions with bibasilar atelectasis and LLL infiltrate.- ( may be sec to rib fracture with Hemothorax)  -BNP 1542  -cardiothoracic surgery recommends thoracentesis and pulm consult   -pulmonology plans for thoracentesis on 2/22, delayed due to Eliquis.       Symptomatic bradycardia     -pt presented due to fall but denies LOC  -baseline HR according to pt's wife is in 30-40s, no pacemaker in place by pt's choice        Atrial fibrillation      -discontinued Apixaban for thoracentesis  On 2/22     Chronic diastolic heart failure     -previous ECHO 2018 revealed EF 60-65% and diastolic dysfunction   -BNP 1542  -resume Arb, Lasix, Spironolactone, and Nitrates     CKD stage 3     -creatinine 1.5 (baseline 1.2)  -trend creatinine      Advanced Dementia    -Palliative care consulted  -pt is DNR per wife  - not interested in hospice  - wants HH at discharge     Pt seen and examined with medical student Sterling Barnes      Signed by Medical Student:  Sterling Barnes

## 2020-02-21 NOTE — ASSESSMENT & PLAN NOTE
Off Eliquis for thoracentesis Saturday 2/22 2/21 off AC . Hx of falls . Possible thora in am . Wife reluctant

## 2020-02-21 NOTE — ASSESSMENT & PLAN NOTE
Off Eliquis for thoracentesis Saturday 2/22.  Called wife on the phone.  She seems agreeable for procedure.?  Fall related effusion versus CHF.  Patient BNP is 1500.  2/21 CXR today . Possible thora in am . Wife a bit hesitant about procedure .

## 2020-02-21 NOTE — ASSESSMENT & PLAN NOTE
-HR remains slow, known persistent bradycardia  -Avoid AV stone blocking agents  -Eliquis on hold for pending thoracentesis, resume post-procedure ASAP. Counseled on fall prevention/using walker

## 2020-02-21 NOTE — PROGRESS NOTES
Brief Palliative Care Progress Note:    Advance Care Planning   Visited with Mr. Morales and wife who was at bedside.  She prefers not to use the [employer] service for our conversation. She states after discussion with her daughter, they are not ready to meet with Hospice for informational visit.  They prefer to take the patient home and care for him as before this hospitalization.    She also states that she reviewed the [employer] document given to her by Palliative Care (in [place]) yesterday however is not interested in filling out the form at this time.  The patient remains a DNR.        Kaitlyn Benedict RN  Palliative Care  [phone]

## 2020-02-21 NOTE — SUBJECTIVE & OBJECTIVE
Past Medical History:   Diagnosis Date    Anxiety     Atrial fibrillation     Chronic congestive heart failure 2/20/2020    Dementia 02/19/2020    Depression     Hypertension     Insomnia     Mobitz type 1 second degree AV block     followed by Dr. Hardy, Cardiology    SAMUEL (obstructive sleep apnea)        Past Surgical History:   Procedure Laterality Date    BACK SURGERY      CATARACT EXTRACTION W/  INTRAOCULAR LENS IMPLANT Bilateral        Review of patient's allergies indicates:  No Known Allergies    No current facility-administered medications on file prior to encounter.      Current Outpatient Medications on File Prior to Encounter   Medication Sig    apixaban (ELIQUIS) 5 mg Tab Take 1 tablet (5 mg total) by mouth 2 (two) times daily.    clonazePAM (KLONOPIN) 0.5 MG tablet Take 1 tablet (0.5 mg total) by mouth every evening.    isosorbide mononitrate (IMDUR) 60 MG 24 hr tablet Take 1 tablet (60 mg total) by mouth once daily.    melatonin 1 mg Tab Take 1 tablet by mouth every evening.    sertraline (ZOLOFT) 100 MG tablet Take 1 tablet (100 mg total) by mouth once daily.    sertraline (ZOLOFT) 50 MG tablet TAKE 1 TABLET BY MOUTH ONCE DAILY FOR A TOTAL OF 150MG PER DAY.    spironolactone (ALDACTONE) 25 MG tablet TAKE 1 TABLET BY MOUTH ONCE DAILY    traZODone (DESYREL) 150 MG tablet TAKE ONE TABLET BY MOUTH ONCE DAILY IN THE EVENING    candesartan (ATACAND) 8 MG tablet Take 2 tablets (16 mg total) by mouth once daily.    furosemide (LASIX) 40 MG tablet Take 1 tablet (40 mg total) by mouth daily as needed. Take next 3 days     Family History     Problem Relation (Age of Onset)    Cancer Father    Diabetes Paternal Grandmother        Tobacco Use    Smoking status: Never Smoker    Smokeless tobacco: Never Used   Substance and Sexual Activity    Alcohol use: Yes     Comment: socially     Drug use: No    Sexual activity: Not on file     Review of Systems   Unable to perform ROS: dementia      Objective:     Vital Signs (Most Recent):  Temp: 97.9 °F (36.6 °C) (02/21/20 0713)  Pulse: (!) 46 (02/21/20 0741)  Resp: 19 (02/21/20 0713)  BP: (!) 150/42 (02/21/20 0713)  SpO2: (!) 94 % (02/21/20 0713) Vital Signs (24h Range):  Temp:  [97.5 °F (36.4 °C)-98 °F (36.7 °C)] 97.9 °F (36.6 °C)  Pulse:  [41-54] 46  Resp:  [18-19] 19  SpO2:  [94 %-95 %] 94 %  BP: (148-160)/(42-58) 150/42     Weight: 78.7 kg (173 lb 8 oz)  Body mass index is 26.38 kg/m².    SpO2: (!) 94 %  O2 Device (Oxygen Therapy): room air      Intake/Output Summary (Last 24 hours) at 2/21/2020 1044  Last data filed at 2/21/2020 0400  Gross per 24 hour   Intake 480 ml   Output --   Net 480 ml       Lines/Drains/Airways     Peripheral Intravenous Line                 Peripheral IV - Single Lumen 02/19/20 2247 20 G Right Antecubital 1 day                Physical Exam   Constitutional: He appears well-developed and well-nourished. No distress.   HENT:   Head: Normocephalic and atraumatic.   Eyes: Pupils are equal, round, and reactive to light. Right eye exhibits no discharge. Left eye exhibits no discharge.   Neck: Neck supple. No JVD present.   Cardiovascular: S1 normal and S2 normal. An irregularly irregular rhythm present. Bradycardia present.   Murmur heard.  Pulmonary/Chest: Effort normal. No respiratory distress. He has no wheezes. He has no rales.   Diminished BS L > R   Abdominal: Soft. He exhibits no distension.   Musculoskeletal: He exhibits no edema.   Neurological: He is alert.   Confused     Skin: Skin is warm and dry. He is not diaphoretic. No erythema.   Psychiatric: He has a normal mood and affect.   Nursing note and vitals reviewed.      Significant Labs:   CMP   Recent Labs   Lab 02/19/20  2246 02/21/20  0450    138   K 4.7 4.5    107   CO2 27 25    91   BUN 33* 24*   CREATININE 1.5* 1.0   CALCIUM 9.1 9.0   PROT 6.6  --    ALBUMIN 3.0*  --    BILITOT 0.4  --    ALKPHOS 177*  --    AST 17  --    ALT 15  --     ANIONGAP 5* 6*   ESTGFRAFRICA 49* >60   EGFRNONAA 42* >60   , CBC   Recent Labs   Lab 02/19/20  2246 02/21/20  0450   WBC 6.60 7.96   HGB 10.9* 11.5*   HCT 34.5* 36.2*    236   , Troponin No results for input(s): TROPONINI in the last 48 hours. and All pertinent lab results from the last 24 hours have been reviewed.    Significant Imaging: Echocardiogram:   2D echo with color flow doppler:   Results for orders placed or performed in visit on 11/15/18   2D Echo w/ Color Flow Doppler   Result Value Ref Range    QEF 60 55 - 65    Mitral Valve Regurgitation TRIVIAL     Aortic Valve Regurgitation MILD TO MODERATE (A)     Est. PA Systolic Pressure 28.79     Narrative    Date of Procedure: 11/15/2018        TEST DESCRIPTION   Technical Quality: This is a technically challenging study.     General: The patient was bradycardic throughout the study.    Aorta: The aortic root is moderately enlarged, measuring 3.2 cm at sinotubular junction and 4.9 cm at Sinuses of Valsalva. The proximal ascending aorta is normal in size, measuring 3.5 cm across.     Left Atrium: The left atrial volume index is moderately enlarged, measuring 44.25 cc/m2.     Left Ventricle: The left ventricle is normal in size, with an end-diastolic diameter of 5.3 cm, and an end-systolic diameter of 3.9 cm. LV wall thickness is normal, with the septum measuring 1.5 cm and the posterior wall measuring 1.4 cm across. Relative   wall thickness was increased at 0.53, and the LV mass index was increased at 193.8 g/m2 consistent with concentric left ventricular hypertrophy. There are no regional wall motion abnormalities. Left ventricular systolic function appears normal. Visually   estimated ejection fraction is 60-65%. The LV Doppler derived stroke volume equals 141.0 ccs.     Diastolic indices: Diastolic function is indeterminate.     Right Atrium: The right atrium is enlarged, measuring 7.0 cm in length and 3.6 cm in width in the apical view.     Right  Ventricle: The right ventricle is normal in size measuring 1.9 cm at the base in the apical right ventricle-focused view. Global right ventricular systolic function appears normal. The estimated PA systolic pressure is 29 mmHg.     Aortic Valve:  The aortic valve is normal in structure. The peak velocity obtained across the aortic valve is 2.01 m/s, which translates to a peak gradient of 16 mmHg. The mean gradient is 7 mmHg. Using a left ventricular outflow tract diameter of 2.3   cm, a left ventricular outflow tract velocity time integral of 35 cm, and a peak instantaneous transvalvular velocity time integral of 45 cm, the calculated aortic valve area is 3.14 cm2(AVAi is 1.5 cm2/m2). Additionally, there is mild to moderate aortic   regurgitation.     Mitral Valve:  The mitral valve is normal in structure. There is trivial mitral regurgitation.     Tricuspid Valve:  The tricuspid valve is normal in structure.     Pulmonary Valve:  The pulmonic valve is not well seen.     IVC: IVC is enlarged but collapses > 50% with a sniff, suggesting intermediate right atrial pressure of 8 mmHg.     Intracavitary: There is no evidence of pericardial effusion, intracavity mass, thrombi, or vegetation.         CONCLUSIONS     1 - Moderately enlarged aortic root.     2 - Biatrial enlargement.     3 - Concentric hypertrophy.     4 - No wall motion abnormalities.     5 - Normal left ventricular systolic function (EF 60-65%).     6 - Indeterminate LV diastolic function.     7 - Normal right ventricular systolic function .     8 - The estimated PA systolic pressure is 29 mmHg.     9 - Mild to moderate aortic regurgitation.     10 - Intermediate central venous pressure.             This document has been electronically    SIGNED BY: Ari Block MD On: 11/15/2018 18:02   , EKG: Reviewed and X-Ray: CXR: X-Ray Chest 1 View (CXR):   Results for orders placed or performed during the hospital encounter of 02/19/20   X-Ray Chest 1 View     Narrative    EXAMINATION:  XR CHEST 1 VIEW; XR RIBS 2 VIEW RIGHT    CLINICAL HISTORY:  Unspecified fall, initial encounter    COMPARISON:  February 9, 2019    FINDINGS:  There is been interval development of a moderate to large laterally positioned left pleural effusion versus marked pleural thickening.  Adjacent lateral and inferior left lung atelectasis.  Stable scarring or chronic atelectasis in the right lung base.  Stable blunting of the right lateral costophrenic angle.  The lungs are otherwise clear.  The cardiac silhouette size is enlarged.  The trachea is midline and the mediastinal width is normal. Negative for pneumothorax.  Pulmonary vasculature is normal. Negative for osseous abnormalities. Ectatic and tortuous aorta with calcifications of the aortic knob.  Degenerative changes of the spine and both shoulder girdles with convex-right curvature.  Postoperative changes involve the right clavicle.    There is an old fracture involving the lateral left 8th rib.  Negative for definite acute rib fracture.      Impression    1.  Interval development of a large laterally positioned, possibly loculated left pleural effusion with adjacent atelectasis/consolidation in the mid and lower left lung.  Differential considerations would include pneumonia with a loculated parapneumonic effusion or empyema, or primary effusion that is loculated laterally of unclear etiology.  No definite underlying rib fracture is seen.  Negative for pneumothorax.    2.  Negative for acute process otherwise.    3.  Stable findings as noted above.      Electronically signed by: Orlin Hernandez MD  Date:    02/19/2020  Time:    22:15    and X-Ray Chest PA and Lateral (CXR): No results found for this visit on 02/19/20.

## 2020-02-21 NOTE — PROGRESS NOTES
[facility]  Pulmonology  Progress Note    Patient Name: Santos Morales  MRN: [id number]  Admission Date: 2/19/2020  Hospital Length of Stay: 0 days  Code Status: DNR  Attending Provider: Traci Marshall MD  Primary Care Provider: Diane Bowen MD   Principal Problem: Empyema, left    Subjective:     Interval History:    2/21 pt seen and examined , wife at bedside . Dementia > 1 year . Does some ADL's at home . + fall , ribs fx , left sdie effusion     Review of Systems   Unable to perform ROS: Dementia         Objective:     Vital Signs (Most Recent):  Temp: 98 °F (36.7 °C) (02/21/20 1115)  Pulse: (!) 46 (02/21/20 1115)  Resp: 20 (02/21/20 1115)  BP: (!) 140/44 (02/21/20 1115)  SpO2: (!) 94 % (02/21/20 1115) Vital Signs (24h Range):  Temp:  [97.5 °F (36.4 °C)-98 °F (36.7 °C)] 98 °F (36.7 °C)  Pulse:  [41-54] 46  Resp:  [18-20] 20  SpO2:  [94 %-95 %] 94 %  BP: (140-160)/(42-58) 140/44     Weight: 78.7 kg (173 lb 8 oz)  Body mass index is 26.38 kg/m².      Intake/Output Summary (Last 24 hours) at 2/21/2020 1120  Last data filed at 2/21/2020 0400  Gross per 24 hour   Intake 480 ml   Output --   Net 480 ml       Physical Exam   Constitutional: He appears well-developed and well-nourished. No distress.   HENT:   Head: Normocephalic and atraumatic.   Hard of hearing.   Eyes: EOM are normal.   Neck: Neck supple.   Cardiovascular:   Bradycardic irregular S1-S2   Pulmonary/Chest: Effort normal. No respiratory distress.   Breath sounds decreased at bases  Decreased on the left more than the right.   Abdominal: Soft. There is no tenderness.   Musculoskeletal: He exhibits no edema or deformity.   Neurological: He is alert.   Confused, follows commands at times.   Psychiatric:   Dementia    Nursing note and vitals reviewed.      Vents:       Lines/Drains/Airways     Peripheral Intravenous Line                 Peripheral IV - Single Lumen 02/19/20 2247 20 G Right Antecubital 1 day                 Significant Labs:    CBC/Anemia Profile:  Recent Labs   Lab 02/19/20  2246 02/21/20  0450   WBC 6.60 7.96   HGB 10.9* 11.5*   HCT 34.5* 36.2*    236   MCV 84 83   RDW 15.2* 14.8*        Chemistries:  Recent Labs   Lab 02/19/20  2246 02/21/20  0450    138   K 4.7 4.5    107   CO2 27 25   BUN 33* 24*   CREATININE 1.5* 1.0   CALCIUM 9.1 9.0   ALBUMIN 3.0*  --    PROT 6.6  --    BILITOT 0.4  --    ALKPHOS 177*  --    ALT 15  --    AST 17  --    MG  --  1.9            Significant Imaging:  CT: I have reviewed all pertinent results/findings within the past 24 hours and my personal findings are:  Airways: Patent.     Lungs: Compressive atelectasis suspected within the lower lobes bilaterally.  Left lower lobe infiltrate also suspected.    Pleura: Moderate left-sided pleural effusion with compressive atelectasis.  Small right-sided pleural effusion with compressive atelectasis.No pleural calcification.    Renuka/Mediastinum: Shotty stone enlargement.    Esophagus: Normal.    Heart/pericardium: Cardiomegaly.  Trace pericardial effusion.  Mild coronary disease.    Pulmonary vasculature: Pulmonary arteries distribute normally.  There are four pulmonary veins.    Aorta: Left-sided aortic arch with 3 arterial branches.  The aorta maintains normal caliber, contour and course. There is mild calcification of the thoracic aorta.  There is  mild coronary artery calcification.        ABG  No results for input(s): PH, PO2, PCO2, HCO3, BE in the last 168 hours.  Assessment/Plan:     Chronic congestive heart failure  Resume Arb, Lasix, spironolactone, and nitrates as OP.  BNP 1500.  2/21 off meds now     Loculated pleural effusion  Off Eliquis for thoracentesis Saturday 2/22.  Called wife on the phone.  She seems agreeable for procedure.?  Fall related effusion versus CHF.  Patient BNP is 1500.  2/21 CXR today . Possible thora in am . Wife a bit hesitant about procedure .     Atrial fibrillation, persistent  Off  Eliquis for thoracentesis Saturday 2/22 2/21 off AC . Hx of falls . Possible thora in am . Wife reluctant     Symptomatic bradycardia  Cardiac monitoring.  Has refused and wife pacemaker in the past.  2/21 cardiac monitoring            Shazia Morocho MD  Pulmonology  [facility]

## 2020-02-22 LAB
ANION GAP SERPL CALC-SCNC: 7 MMOL/L (ref 8–16)
BASOPHILS # BLD AUTO: 0.05 K/UL (ref 0–0.2)
BASOPHILS NFR BLD: 0.4 % (ref 0–1.9)
BUN SERPL-MCNC: 21 MG/DL (ref 8–23)
CALCIUM SERPL-MCNC: 9.2 MG/DL (ref 8.7–10.5)
CHLORIDE SERPL-SCNC: 104 MMOL/L (ref 95–110)
CO2 SERPL-SCNC: 25 MMOL/L (ref 23–29)
CREAT SERPL-MCNC: 1 MG/DL (ref 0.5–1.4)
DIFFERENTIAL METHOD: ABNORMAL
EOSINOPHIL # BLD AUTO: 0.1 K/UL (ref 0–0.5)
EOSINOPHIL NFR BLD: 1 % (ref 0–8)
ERYTHROCYTE [DISTWIDTH] IN BLOOD BY AUTOMATED COUNT: 15.2 % (ref 11.5–14.5)
EST. GFR  (AFRICAN AMERICAN): >60 ML/MIN/1.73 M^2
EST. GFR  (NON AFRICAN AMERICAN): >60 ML/MIN/1.73 M^2
GLUCOSE SERPL-MCNC: 119 MG/DL (ref 70–110)
HCT VFR BLD AUTO: 38.3 % (ref 40–54)
HGB BLD-MCNC: 12 G/DL (ref 14–18)
IMM GRANULOCYTES # BLD AUTO: 0.05 K/UL (ref 0–0.04)
IMM GRANULOCYTES NFR BLD AUTO: 0.4 % (ref 0–0.5)
LYMPHOCYTES # BLD AUTO: 1.6 K/UL (ref 1–4.8)
LYMPHOCYTES NFR BLD: 12.2 % (ref 18–48)
MCH RBC QN AUTO: 26.3 PG (ref 27–31)
MCHC RBC AUTO-ENTMCNC: 31.3 G/DL (ref 32–36)
MCV RBC AUTO: 84 FL (ref 82–98)
MONOCYTES # BLD AUTO: 1.1 K/UL (ref 0.3–1)
MONOCYTES NFR BLD: 8 % (ref 4–15)
NEUTROPHILS # BLD AUTO: 10.3 K/UL (ref 1.8–7.7)
NEUTROPHILS NFR BLD: 78 % (ref 38–73)
NRBC BLD-RTO: 0 /100 WBC
PLATELET # BLD AUTO: 293 K/UL (ref 150–350)
PMV BLD AUTO: 10.2 FL (ref 9.2–12.9)
POTASSIUM SERPL-SCNC: 4.2 MMOL/L (ref 3.5–5.1)
RBC # BLD AUTO: 4.56 M/UL (ref 4.6–6.2)
SODIUM SERPL-SCNC: 136 MMOL/L (ref 136–145)
WBC # BLD AUTO: 13.17 K/UL (ref 3.9–12.7)

## 2020-02-22 PROCEDURE — S0030 INJECTION, METRONIDAZOLE: HCPCS | Performed by: FAMILY MEDICINE

## 2020-02-22 PROCEDURE — 21400001 HC TELEMETRY ROOM

## 2020-02-22 PROCEDURE — 25000003 PHARM REV CODE 250: Performed by: INTERNAL MEDICINE

## 2020-02-22 PROCEDURE — 99232 SBSQ HOSP IP/OBS MODERATE 35: CPT | Mod: ,,, | Performed by: INTERNAL MEDICINE

## 2020-02-22 PROCEDURE — 99232 PR SUBSEQUENT HOSPITAL CARE,LEVL II: ICD-10-PCS | Mod: ,,, | Performed by: INTERNAL MEDICINE

## 2020-02-22 PROCEDURE — 36415 COLL VENOUS BLD VENIPUNCTURE: CPT

## 2020-02-22 PROCEDURE — 80048 BASIC METABOLIC PNL TOTAL CA: CPT

## 2020-02-22 PROCEDURE — 25000003 PHARM REV CODE 250: Performed by: NURSE PRACTITIONER

## 2020-02-22 PROCEDURE — 63600175 PHARM REV CODE 636 W HCPCS: Performed by: FAMILY MEDICINE

## 2020-02-22 PROCEDURE — 85025 COMPLETE CBC W/AUTO DIFF WBC: CPT

## 2020-02-22 PROCEDURE — 25000003 PHARM REV CODE 250: Performed by: FAMILY MEDICINE

## 2020-02-22 RX ORDER — DOXEPIN HYDROCHLORIDE 25 MG/1
25 CAPSULE ORAL NIGHTLY
Status: DISCONTINUED | OUTPATIENT
Start: 2020-02-22 | End: 2020-02-23 | Stop reason: HOSPADM

## 2020-02-22 RX ORDER — METRONIDAZOLE 500 MG/100ML
500 INJECTION, SOLUTION INTRAVENOUS
Status: DISCONTINUED | OUTPATIENT
Start: 2020-02-22 | End: 2020-02-23 | Stop reason: HOSPADM

## 2020-02-22 RX ORDER — QUETIAPINE FUMARATE 25 MG/1
25 TABLET, FILM COATED ORAL ONCE
Status: COMPLETED | OUTPATIENT
Start: 2020-02-22 | End: 2020-02-22

## 2020-02-22 RX ORDER — ZOLPIDEM TARTRATE 5 MG/1
5 TABLET ORAL ONCE
Status: COMPLETED | OUTPATIENT
Start: 2020-02-22 | End: 2020-02-22

## 2020-02-22 RX ORDER — DOXYCYCLINE HYCLATE 100 MG
100 TABLET ORAL EVERY 12 HOURS
Status: DISCONTINUED | OUTPATIENT
Start: 2020-02-22 | End: 2020-02-22

## 2020-02-22 RX ADMIN — METRONIDAZOLE 500 MG: 500 INJECTION, SOLUTION INTRAVENOUS at 08:02

## 2020-02-22 RX ADMIN — DOXYCYCLINE HYCLATE 100 MG: 100 TABLET, COATED ORAL at 11:02

## 2020-02-22 RX ADMIN — SERTRALINE HYDROCHLORIDE 150 MG: 50 TABLET ORAL at 08:02

## 2020-02-22 RX ADMIN — ZOLPIDEM TARTRATE 5 MG: 5 TABLET, COATED ORAL at 11:02

## 2020-02-22 RX ADMIN — METRONIDAZOLE 500 MG: 500 INJECTION, SOLUTION INTRAVENOUS at 12:02

## 2020-02-22 RX ADMIN — CLONAZEPAM 0.5 MG: 0.5 TABLET ORAL at 11:02

## 2020-02-22 RX ADMIN — CEFTRIAXONE 1 G: 1 INJECTION, SOLUTION INTRAVENOUS at 12:02

## 2020-02-22 RX ADMIN — QUETIAPINE FUMARATE 25 MG: 25 TABLET ORAL at 01:02

## 2020-02-22 RX ADMIN — DOXEPIN HYDROCHLORIDE 25 MG: 25 CAPSULE ORAL at 08:02

## 2020-02-22 NOTE — HOSPITAL COURSE
Patient was admitted for left-sided pleural effusion.  Pulmonology was consulted on the case.  Due to end-stage dementia palliative Care was also consulted on the case to discuss hospice versus home with hospice.  Wife reports that daughter declined hospice at this time however wife is willing to go home with home health.    Family decided against thoracentesis, HR in the 40s-50s. Patient's HR runs chronically low. Declined pacemaker. Wife complains of cough for the past 2-3 weeks. Will start on rocephin and flagyl empirically for possible empyema. Ambien given once for insomnia. Wife states patient has not sleep but 2 hours in the past 2 nights. Trazodone and seroquel ineffective thus far.     On the day of discharge daughter requested for physical therapy to evaluate patient for any home needs.  Wife however declined physical therapy inpatient as she requested to go home and have physical therapy evaluate patient there with home health. Order for home health was placed.  Patient was afebrile and leukocytosis had resolved.  Decision was made to switch over to oral Augmentin twice daily to continue for the next 5 days.  Hospice was once again discussed with wife reports that this time she is not ready for hospice yet today we decide once he gets home on the plan of action going for.  All questions answered patient was stable for discharge.

## 2020-02-22 NOTE — PROGRESS NOTES
[facility]  Pulmonology  Progress Note    Patient Name: Santos Morales  MRN: [id number]  Admission Date: 2/19/2020  Hospital Length of Stay: 1 days  Code Status: DNR  Attending Provider: Vinod Mcallister MD  Primary Care Provider: Diane Bowen MD   Principal Problem: Empyema, left    Subjective:     Interval History:  2/22 patient seen and examined.  Wife at bedside.  On room air.  No distress. Afebrile.  Chest x-ray yesterday reviewed.  No changes in large left-sided effusion.      Review of Systems   Unable to perform ROS: Dementia       Objective:     Vital Signs (Most Recent):  Temp: 98.1 °F (36.7 °C) (02/22/20 0705)  Pulse: (!) 54 (02/22/20 0705)  Resp: 20 (02/22/20 0705)  BP: (!) 160/56 (02/22/20 0705)  SpO2: (!) 90 % (02/22/20 0705) Vital Signs (24h Range):  Temp:  [96.4 °F (35.8 °C)-98.3 °F (36.8 °C)] 98.1 °F (36.7 °C)  Pulse:  [42-54] 54  Resp:  [20] 20  SpO2:  [90 %-94 %] 90 %  BP: (140-160)/(44-62) 160/56     Weight: 80 kg (176 lb 5.9 oz)  Body mass index is 26.82 kg/m².      Intake/Output Summary (Last 24 hours) at 2/22/2020 1046  Last data filed at 2/22/2020 0438  Gross per 24 hour   Intake 454 ml   Output --   Net 454 ml       Physical Exam   Constitutional: He appears well-developed and well-nourished. No distress.   HENT:   Head: Normocephalic and atraumatic.   Hard of hearing.   Eyes: EOM are normal.   Neck: Neck supple.   Cardiovascular:   Bradycardic irregular S1-S2   Pulmonary/Chest: Effort normal. No respiratory distress.   Breath sounds decreased at bases  Decreased on the left more than the right.   Abdominal: Soft. There is no tenderness.   Musculoskeletal: He exhibits no edema or deformity.   Neurological: He is alert.   Confused, follows commands at times.   Psychiatric:   Dementia    Nursing note and vitals reviewed.      Vents:       Lines/Drains/Airways     Peripheral Intravenous Line                 Peripheral IV - Single Lumen 02/19/20 2247 20 G Right Antecubital 2  days                Significant Labs:    CBC/Anemia Profile:  Recent Labs   Lab 02/21/20  0450 02/22/20  0837   WBC 7.96 13.17*   HGB 11.5* 12.0*   HCT 36.2* 38.3*    293   MCV 83 84   RDW 14.8* 15.2*        Chemistries:  Recent Labs   Lab 02/21/20  0450 02/22/20  0837    136   K 4.5 4.2    104   CO2 25 25   BUN 24* 21   CREATININE 1.0 1.0   CALCIUM 9.0 9.2   MG 1.9  --             Significant Imaging:  CXR: I have reviewed all pertinent results/findings within the past 24 hours and my personal findings are:  Stable large-sized left-sided effusion      ABG  No results for input(s): PH, PO2, PCO2, HCO3, BE in the last 168 hours.  Assessment/Plan:     Loculated pleural effusion  Off Eliquis for thoracentesis Saturday 2/22.  Called wife on the phone.  She seems agreeable for procedure.?  Fall related effusion versus CHF.  Patient BNP is 1500.  2/21 CXR today . Possible thora in am . Wife a bit hesitant about procedure .   2/22 will not proceed with thoracentesis.  Discussed with wife.    Atrial fibrillation, persistent  Off Eliquis for thoracentesis Saturday 2/22 2/21 off AC . Hx of falls . Possible thora in am . Wife reluctant   2/22 note thoracentesis.  Frequent falls.  Ribs fracture.  84-year-old male with dementia, dependent at home with ADL.  Benefits of anticoagulation do not outweigh risks.  I recommend to keep off anticoagulation.  Discussed with Cardiology.    Symptomatic bradycardia  Cardiac monitoring.  Has refused and wife pacemaker in the past.  2/21 cardiac monitoring   2/22 in the past patient wife has refused pacemaker.           Shazia Morocho MD  Pulmonology  [facility]

## 2020-02-22 NOTE — ASSESSMENT & PLAN NOTE
Left loculated pleural effusion versus empyema.  Pulmonary consult.      2/22:  Wife decided against thoracentesis  Was made aware by pulm on plan   Started rocephin and flagyl for empiric treatment of empyema  Will discuss with pulm

## 2020-02-22 NOTE — ASSESSMENT & PLAN NOTE
Off Eliquis for thoracentesis Saturday 2/22 2/21 off AC . Hx of falls . Possible thora in am . Wife reluctant   2/22 note thoracentesis.  Frequent falls.  Ribs fracture.  84-year-old male with dementia, dependent at home with ADL.  Benefits of anticoagulation do not outweigh risks.  I recommend to keep off anticoagulation.  Discussed with Cardiology.

## 2020-02-22 NOTE — PLAN OF CARE
Patient AAOx4. VSS.  Patient remained afebrile throughout the shift.  Heart rate closely monitored   Patient AFIB on monitor.  IV ABx infusing  Wife and sitter at bedside, Bed alarm on  Patient remained free of falls this shift.  Plan of care reviewed.  Patient verbalized understanding.  Patient moving/turning independently  Frequent weight shifting encouraged.  Bed low, siderails up x2, wheels locked, call light in reach.  Patient instructed to call for assistance.  Hourly rounding completed.  Will continue to monitor.

## 2020-02-22 NOTE — SUBJECTIVE & OBJECTIVE
Interval History:  2/22 patient seen and examined.  Wife at bedside.  On room air.  No distress. Afebrile.  Chest x-ray yesterday reviewed.  No changes in large left-sided effusion.      Review of Systems   Unable to perform ROS: Dementia       Objective:     Vital Signs (Most Recent):  Temp: 98.1 °F (36.7 °C) (02/22/20 0705)  Pulse: (!) 54 (02/22/20 0705)  Resp: 20 (02/22/20 0705)  BP: (!) 160/56 (02/22/20 0705)  SpO2: (!) 90 % (02/22/20 0705) Vital Signs (24h Range):  Temp:  [96.4 °F (35.8 °C)-98.3 °F (36.8 °C)] 98.1 °F (36.7 °C)  Pulse:  [42-54] 54  Resp:  [20] 20  SpO2:  [90 %-94 %] 90 %  BP: (140-160)/(44-62) 160/56     Weight: 80 kg (176 lb 5.9 oz)  Body mass index is 26.82 kg/m².      Intake/Output Summary (Last 24 hours) at 2/22/2020 1046  Last data filed at 2/22/2020 0438  Gross per 24 hour   Intake 454 ml   Output --   Net 454 ml       Physical Exam   Constitutional: He appears well-developed and well-nourished. No distress.   HENT:   Head: Normocephalic and atraumatic.   Hard of hearing.   Eyes: EOM are normal.   Neck: Neck supple.   Cardiovascular:   Bradycardic irregular S1-S2   Pulmonary/Chest: Effort normal. No respiratory distress.   Breath sounds decreased at bases  Decreased on the left more than the right.   Abdominal: Soft. There is no tenderness.   Musculoskeletal: He exhibits no edema or deformity.   Neurological: He is alert.   Confused, follows commands at times.   Psychiatric:   Dementia    Nursing note and vitals reviewed.      Vents:       Lines/Drains/Airways     Peripheral Intravenous Line                 Peripheral IV - Single Lumen 02/19/20 2247 20 G Right Antecubital 2 days                Significant Labs:    CBC/Anemia Profile:  Recent Labs   Lab 02/21/20  0450 02/22/20  0837   WBC 7.96 13.17*   HGB 11.5* 12.0*   HCT 36.2* 38.3*    293   MCV 83 84   RDW 14.8* 15.2*        Chemistries:  Recent Labs   Lab 02/21/20  0450 02/22/20  0837    136   K 4.5 4.2    104   CO2  25 25   BUN 24* 21   CREATININE 1.0 1.0   CALCIUM 9.0 9.2   MG 1.9  --             Significant Imaging:  CXR: I have reviewed all pertinent results/findings within the past 24 hours and my personal findings are:  Stable large-sized left-sided effusion

## 2020-02-22 NOTE — PROGRESS NOTES
[facility] Hospital Medicine  Progress Note    Patient Name: Santos Morales  MRN: [id number]  Patient Class: IP- Inpatient   Admission Date: 2/19/2020  Length of Stay: 1 days  Attending Physician: Vinod Mcallister MD  Primary Care Provider: Diane Bowen MD        Subjective:     Principal Problem:Chronic diastolic heart failure        HPI:  Mr. Morales is an elderly 84-year-old  male with PMH significant for atrial fibrillation, currently anticoagulated on apixaban, dementia, presented to the ED after sustaining a fall onto his left side.  According to the patient's wife, patient has chronic bradycardia, heart rate is all within the 30s to 40s, but declined pacemaker placement in the past.  Denies syncopal episode today.  Does not like to use walkers or canes (according to the spouse).  He started complaining of left rib pain and was brought to the ED via ambulance.  Imaging studies revealed large left-sided loculated pleural effusion with old left posterior 8th rib fracture.  Laboratory workup reveals elevated BNP of 1500, creatinine 1.5 (baseline around 1.2 few years ago).  Patient is currently comfortable, complaining of minimal pain.    Admit diagnosis left loculated pleural effusion versus empyema.    Overview/Hospital Course:  2/22:  Family decided against thoracentesis, HR in the 40s-50s. Patient's HR runs chronically low. Declined pacemaker. Wife complains of cough for the past 2-3 weeks. Will start doxycycline po bid. ambien given once for insomnia. Wife states patient has not sleep but 2 hours in the past 2 nights. Trazodone and seroquel ineffective thus far.     Interval History: No acute events reported overnight by nurse. Wife states patient has not been sleeping the past 2 nights. Seroquel and trazodone has not been effective as of yet. HR in the 40s. Patient has declined pacemaker.     Review of Systems   Unable to perform ROS: Dementia     Objective:     Vital Signs (Most  Recent):  Temp: 98.1 °F (36.7 °C) (02/22/20 0705)  Pulse: (!) 49 (02/22/20 0900)  Resp: 20 (02/22/20 0705)  BP: (!) 160/56 (02/22/20 0705)  SpO2: (!) 90 % (02/22/20 0705) Vital Signs (24h Range):  Temp:  [96.4 °F (35.8 °C)-98.3 °F (36.8 °C)] 98.1 °F (36.7 °C)  Pulse:  [42-54] 49  Resp:  [20] 20  SpO2:  [90 %-92 %] 90 %  BP: (152-160)/(50-62) 160/56     Weight: 80 kg (176 lb 5.9 oz)  Body mass index is 26.82 kg/m².    Intake/Output Summary (Last 24 hours) at 2/22/2020 1146  Last data filed at 2/22/2020 0438  Gross per 24 hour   Intake 336 ml   Output --   Net 336 ml      Physical Exam   Constitutional: He is oriented to person, place, and time. He appears well-developed and well-nourished. No distress.   HENT:   Head: Normocephalic and atraumatic.   Cardiovascular: Regular rhythm and normal heart sounds. Exam reveals no gallop and no friction rub.   No murmur heard.  bradycardic   Pulmonary/Chest: Effort normal and breath sounds normal. No stridor. No respiratory distress. He has no wheezes. He has no rales.   Abdominal: Soft. Bowel sounds are normal. He exhibits no distension and no mass. There is no tenderness. There is no guarding.   Musculoskeletal: Normal range of motion. He exhibits no edema.   Neurological: He is alert and oriented to person, place, and time.   Skin: Skin is warm. He is not diaphoretic.       Significant Labs:   Recent Lab Results       02/22/20  0837        Anion Gap 7     Baso # 0.05     Basophil% 0.4     BUN, Bld 21     Calcium 9.2     Chloride 104     CO2 25     Creatinine 1.0     Differential Method Automated     eGFR if  >60     eGFR if non  >60  Comment:  Calculation used to obtain the estimated glomerular filtration  rate (eGFR) is the CKD-EPI equation.        Eos # 0.1     Eosinophil% 1.0     Glucose 119     Gran # (ANC) 10.3     Gran% 78.0     Hematocrit 38.3     Hemoglobin 12.0     Immature Grans (Abs) 0.05  Comment:  Mild elevation in immature  granulocytes is non specific and   can be seen in a variety of conditions including stress response,   acute inflammation, trauma and pregnancy. Correlation with other   laboratory and clinical findings is essential.       Immature Granulocytes 0.4     Lymph # 1.6     Lymph% 12.2     MCH 26.3     MCHC 31.3     MCV 84     Mono # 1.1     Mono% 8.0     MPV 10.2     nRBC 0     Platelets 293     Potassium 4.2     RBC 4.56     RDW 15.2     Sodium 136     WBC 13.17         All pertinent labs within the past 24 hours have been reviewed.    Significant Imaging: I have reviewed all pertinent imaging results/findings within the past 24 hours.      Assessment/Plan:      * Chronic diastolic heart failure  Echocardiogram done in 2018 reveals indeterminate diastolic function, EF 60-65%.  Currently not in exacerbation.  Monitor closely on telemetry.    2/22:  Cont to monitor  Currently not symptomatic       Loculated pleural effusion  Left loculated pleural effusion versus empyema.  Pulmonary consult.      2/22:  Wife decided against thoracentesis  Was made aware by pulm on plan   Started rocephin and flagyl for empiric treatment of empyema  Will discuss with pulm     Atrial fibrillation, persistent  Currently bradycardic, heart rate in the 30s to 40s.  Patient declined pacemaker in the past.  Last dose of apixaban last night.    2/22:  Currently bradycardic  At increase risk for adverse effects from chronic anticoagulation  Will plan to keep off of AC        Symptomatic bradycardia  Patient had a fall at home onto his left side.  No loss of consciousness.  Heart rate in the 30s to 50s.  According to the spouse, this is baseline, and he adamantly declined pacemaker in the past.    2/22:  Continues to decline pacemaker  Cont to monitor HR         CKD (chronic kidney disease) stage 3, GFR 30-59 ml/min  Serum creatinine 1.5, (was 1.2 few years ago).  Avoid nephrotoxin agents.      VTE Risk Mitigation (From admission, onward)          Ordered     Place sequential compression device  Until discontinued      02/20/20 8716                       Vinod Mcallister MD  Department of Hospital Medicine   [facility]

## 2020-02-22 NOTE — ASSESSMENT & PLAN NOTE
Patient had a fall at home onto his left side.  No loss of consciousness.  Heart rate in the 30s to 50s.  According to the spouse, this is baseline, and he adamantly declined pacemaker in the past.    2/22:  Continues to decline pacemaker  Cont to monitor HR

## 2020-02-22 NOTE — SUBJECTIVE & OBJECTIVE
Interval History: No acute events reported overnight by nurse. Wife states patient has not been sleeping the past 2 nights. Seroquel and trazodone has not been effective as of yet. HR in the 40s. Patient has declined pacemaker.     Review of Systems   Unable to perform ROS: Dementia     Objective:     Vital Signs (Most Recent):  Temp: 98.1 °F (36.7 °C) (02/22/20 0705)  Pulse: (!) 49 (02/22/20 0900)  Resp: 20 (02/22/20 0705)  BP: (!) 160/56 (02/22/20 0705)  SpO2: (!) 90 % (02/22/20 0705) Vital Signs (24h Range):  Temp:  [96.4 °F (35.8 °C)-98.3 °F (36.8 °C)] 98.1 °F (36.7 °C)  Pulse:  [42-54] 49  Resp:  [20] 20  SpO2:  [90 %-92 %] 90 %  BP: (152-160)/(50-62) 160/56     Weight: 80 kg (176 lb 5.9 oz)  Body mass index is 26.82 kg/m².    Intake/Output Summary (Last 24 hours) at 2/22/2020 1146  Last data filed at 2/22/2020 0438  Gross per 24 hour   Intake 336 ml   Output --   Net 336 ml      Physical Exam   Constitutional: He is oriented to person, place, and time. He appears well-developed and well-nourished. No distress.   HENT:   Head: Normocephalic and atraumatic.   Cardiovascular: Regular rhythm and normal heart sounds. Exam reveals no gallop and no friction rub.   No murmur heard.  bradycardic   Pulmonary/Chest: Effort normal and breath sounds normal. No stridor. No respiratory distress. He has no wheezes. He has no rales.   Abdominal: Soft. Bowel sounds are normal. He exhibits no distension and no mass. There is no tenderness. There is no guarding.   Musculoskeletal: Normal range of motion. He exhibits no edema.   Neurological: He is alert and oriented to person, place, and time.   Skin: Skin is warm. He is not diaphoretic.       Significant Labs:   Recent Lab Results       02/22/20  0837        Anion Gap 7     Baso # 0.05     Basophil% 0.4     BUN, Bld 21     Calcium 9.2     Chloride 104     CO2 25     Creatinine 1.0     Differential Method Automated     eGFR if  >60     eGFR if non   >60  Comment:  Calculation used to obtain the estimated glomerular filtration  rate (eGFR) is the CKD-EPI equation.        Eos # 0.1     Eosinophil% 1.0     Glucose 119     Gran # (ANC) 10.3     Gran% 78.0     Hematocrit 38.3     Hemoglobin 12.0     Immature Grans (Abs) 0.05  Comment:  Mild elevation in immature granulocytes is non specific and   can be seen in a variety of conditions including stress response,   acute inflammation, trauma and pregnancy. Correlation with other   laboratory and clinical findings is essential.       Immature Granulocytes 0.4     Lymph # 1.6     Lymph% 12.2     MCH 26.3     MCHC 31.3     MCV 84     Mono # 1.1     Mono% 8.0     MPV 10.2     nRBC 0     Platelets 293     Potassium 4.2     RBC 4.56     RDW 15.2     Sodium 136     WBC 13.17         All pertinent labs within the past 24 hours have been reviewed.    Significant Imaging: I have reviewed all pertinent imaging results/findings within the past 24 hours.

## 2020-02-22 NOTE — ASSESSMENT & PLAN NOTE
Off Eliquis for thoracentesis Saturday 2/22.  Called wife on the phone.  She seems agreeable for procedure.?  Fall related effusion versus CHF.  Patient BNP is 1500.  2/21 CXR today . Possible thora in am . Wife a bit hesitant about procedure .   2/22 will not proceed with thoracentesis.  Discussed with wife.

## 2020-02-22 NOTE — ASSESSMENT & PLAN NOTE
Echocardiogram done in 2018 reveals indeterminate diastolic function, EF 60-65%.  Currently not in exacerbation.  Monitor closely on telemetry.    2/22:  Cont to monitor  Currently not symptomatic

## 2020-02-22 NOTE — ASSESSMENT & PLAN NOTE
Currently bradycardic, heart rate in the 30s to 40s.  Patient declined pacemaker in the past.  Last dose of apixaban last night.    2/22:  Currently bradycardic  At increase risk for adverse effects from chronic anticoagulation  Will plan to keep off of AC

## 2020-02-22 NOTE — ASSESSMENT & PLAN NOTE
Cardiac monitoring.  Has refused and wife pacemaker in the past.  2/21 cardiac monitoring   2/22 in the past patient wife has refused pacemaker.

## 2020-02-23 VITALS
OXYGEN SATURATION: 96 % | BODY MASS INDEX: 27.28 KG/M2 | SYSTOLIC BLOOD PRESSURE: 133 MMHG | RESPIRATION RATE: 18 BRPM | HEART RATE: 62 BPM | TEMPERATURE: 97 F | HEIGHT: 68 IN | WEIGHT: 180 LBS | DIASTOLIC BLOOD PRESSURE: 63 MMHG

## 2020-02-23 PROBLEM — R00.1 SYMPTOMATIC BRADYCARDIA: Status: RESOLVED | Noted: 2019-04-09 | Resolved: 2020-02-23

## 2020-02-23 PROBLEM — J86.9 EMPYEMA, LEFT: Status: RESOLVED | Noted: 2020-02-21 | Resolved: 2020-02-23

## 2020-02-23 LAB
ANION GAP SERPL CALC-SCNC: 7 MMOL/L (ref 8–16)
AORTIC ROOT ANNULUS: 4.43 CM
AV INDEX (PROSTH): 0.7
AV MEAN GRADIENT: 12 MMHG
AV PEAK GRADIENT: 26 MMHG
AV VALVE AREA: 2.89 CM2
AV VELOCITY RATIO: 0.7
BASOPHILS # BLD AUTO: 0.04 K/UL (ref 0–0.2)
BASOPHILS NFR BLD: 0.4 % (ref 0–1.9)
BSA FOR ECHO PROCEDURE: 1.98 M2
BUN SERPL-MCNC: 23 MG/DL (ref 8–23)
CALCIUM SERPL-MCNC: 9.2 MG/DL (ref 8.7–10.5)
CHLORIDE SERPL-SCNC: 104 MMOL/L (ref 95–110)
CO2 SERPL-SCNC: 27 MMOL/L (ref 23–29)
CREAT SERPL-MCNC: 1 MG/DL (ref 0.5–1.4)
CV ECHO LV RWT: 0.38 CM
DIFFERENTIAL METHOD: ABNORMAL
DOP CALC AO PEAK VEL: 2.53 M/S
DOP CALC AO VTI: 45.46 CM
DOP CALC LVOT AREA: 4.1 CM2
DOP CALC LVOT DIAMETER: 2.29 CM
DOP CALC LVOT PEAK VEL: 1.77 M/S
DOP CALC LVOT STROKE VOLUME: 131.16 CM3
DOP CALCLVOT PEAK VEL VTI: 31.86 CM
E WAVE DECELERATION TIME: 129.21 MSEC
E/A RATIO: 2.56
E/E' RATIO: 11.71 M/S
ECHO LV POSTERIOR WALL: 1.16 CM (ref 0.6–1.1)
EOSINOPHIL # BLD AUTO: 0.2 K/UL (ref 0–0.5)
EOSINOPHIL NFR BLD: 1.9 % (ref 0–8)
ERYTHROCYTE [DISTWIDTH] IN BLOOD BY AUTOMATED COUNT: 15 % (ref 11.5–14.5)
EST. GFR  (AFRICAN AMERICAN): >60 ML/MIN/1.73 M^2
EST. GFR  (NON AFRICAN AMERICAN): >60 ML/MIN/1.73 M^2
FRACTIONAL SHORTENING: 33 % (ref 28–44)
GLUCOSE SERPL-MCNC: 87 MG/DL (ref 70–110)
HCT VFR BLD AUTO: 37.7 % (ref 40–54)
HGB BLD-MCNC: 11.8 G/DL (ref 14–18)
IMM GRANULOCYTES # BLD AUTO: 0.03 K/UL (ref 0–0.04)
IMM GRANULOCYTES NFR BLD AUTO: 0.3 % (ref 0–0.5)
INTERVENTRICULAR SEPTUM: 1.58 CM (ref 0.6–1.1)
LA MAJOR: 7.48 CM
LA MINOR: 6.58 CM
LA WIDTH: 5.68 CM
LEFT ATRIUM SIZE: 5.62 CM
LEFT ATRIUM VOLUME INDEX: 97.2 ML/M2
LEFT ATRIUM VOLUME: 189.97 CM3
LEFT INTERNAL DIMENSION IN SYSTOLE: 4.07 CM (ref 2.1–4)
LEFT VENTRICLE DIASTOLIC VOLUME INDEX: 93.02 ML/M2
LEFT VENTRICLE DIASTOLIC VOLUME: 181.8 ML
LEFT VENTRICLE MASS INDEX: 194 G/M2
LEFT VENTRICLE SYSTOLIC VOLUME INDEX: 37.3 ML/M2
LEFT VENTRICLE SYSTOLIC VOLUME: 72.92 ML
LEFT VENTRICULAR INTERNAL DIMENSION IN DIASTOLE: 6.03 CM (ref 3.5–6)
LEFT VENTRICULAR MASS: 379.38 G
LV LATERAL E/E' RATIO: 10.25 M/S
LV SEPTAL E/E' RATIO: 13.67 M/S
LYMPHOCYTES # BLD AUTO: 1.5 K/UL (ref 1–4.8)
LYMPHOCYTES NFR BLD: 16.4 % (ref 18–48)
MCH RBC QN AUTO: 25.8 PG (ref 27–31)
MCHC RBC AUTO-ENTMCNC: 31.3 G/DL (ref 32–36)
MCV RBC AUTO: 82 FL (ref 82–98)
MONOCYTES # BLD AUTO: 0.8 K/UL (ref 0.3–1)
MONOCYTES NFR BLD: 9.3 % (ref 4–15)
MV PEAK A VEL: 0.32 M/S
MV PEAK E VEL: 0.82 M/S
NEUTROPHILS # BLD AUTO: 6.5 K/UL (ref 1.8–7.7)
NEUTROPHILS NFR BLD: 71.7 % (ref 38–73)
NRBC BLD-RTO: 0 /100 WBC
PISA TR MAX VEL: 3.3 M/S
PLATELET # BLD AUTO: 315 K/UL (ref 150–350)
PMV BLD AUTO: 10.3 FL (ref 9.2–12.9)
POTASSIUM SERPL-SCNC: 4.3 MMOL/L (ref 3.5–5.1)
RA MAJOR: 7.22 CM
RA PRESSURE: 3 MMHG
RA WIDTH: 5.5 CM
RBC # BLD AUTO: 4.58 M/UL (ref 4.6–6.2)
SINUS: 4.27 CM
SODIUM SERPL-SCNC: 138 MMOL/L (ref 136–145)
STJ: 3.45 CM
TDI LATERAL: 0.08 M/S
TDI SEPTAL: 0.06 M/S
TDI: 0.07 M/S
TR MAX PG: 44 MMHG
TRICUSPID ANNULAR PLANE SYSTOLIC EXCURSION: 2.27 CM
TV REST PULMONARY ARTERY PRESSURE: 47 MMHG
WBC # BLD AUTO: 9.04 K/UL (ref 3.9–12.7)

## 2020-02-23 PROCEDURE — 85025 COMPLETE CBC W/AUTO DIFF WBC: CPT

## 2020-02-23 PROCEDURE — 99900037 HC PT THERAPY SCREENING (STAT)

## 2020-02-23 PROCEDURE — G0009 ADMIN PNEUMOCOCCAL VACCINE: HCPCS | Performed by: EMERGENCY MEDICINE

## 2020-02-23 PROCEDURE — 90670 PCV13 VACCINE IM: CPT | Performed by: EMERGENCY MEDICINE

## 2020-02-23 PROCEDURE — 63600175 PHARM REV CODE 636 W HCPCS: Performed by: INTERNAL MEDICINE

## 2020-02-23 PROCEDURE — 99900038 HC OT GENERIC THERAPY SCREENING (STAT)

## 2020-02-23 PROCEDURE — 25000003 PHARM REV CODE 250: Performed by: INTERNAL MEDICINE

## 2020-02-23 PROCEDURE — 80048 BASIC METABOLIC PNL TOTAL CA: CPT

## 2020-02-23 PROCEDURE — 63600175 PHARM REV CODE 636 W HCPCS: Performed by: FAMILY MEDICINE

## 2020-02-23 PROCEDURE — 99231 SBSQ HOSP IP/OBS SF/LOW 25: CPT | Mod: ,,, | Performed by: INTERNAL MEDICINE

## 2020-02-23 PROCEDURE — 99231 PR SUBSEQUENT HOSPITAL CARE,LEVL I: ICD-10-PCS | Mod: ,,, | Performed by: INTERNAL MEDICINE

## 2020-02-23 PROCEDURE — 90471 IMMUNIZATION ADMIN: CPT | Performed by: EMERGENCY MEDICINE

## 2020-02-23 PROCEDURE — 36415 COLL VENOUS BLD VENIPUNCTURE: CPT

## 2020-02-23 PROCEDURE — 25000003 PHARM REV CODE 250: Performed by: FAMILY MEDICINE

## 2020-02-23 PROCEDURE — S0030 INJECTION, METRONIDAZOLE: HCPCS | Performed by: FAMILY MEDICINE

## 2020-02-23 PROCEDURE — 63600175 PHARM REV CODE 636 W HCPCS: Performed by: EMERGENCY MEDICINE

## 2020-02-23 RX ORDER — AMOXICILLIN AND CLAVULANATE POTASSIUM 875; 125 MG/1; MG/1
1 TABLET, FILM COATED ORAL 2 TIMES DAILY
Qty: 10 TABLET | Refills: 0 | Status: SHIPPED | OUTPATIENT
Start: 2020-02-24 | End: 2020-02-29

## 2020-02-23 RX ORDER — QUETIAPINE FUMARATE 50 MG/1
50 TABLET, FILM COATED ORAL NIGHTLY
Qty: 30 TABLET | Refills: 0 | Status: SHIPPED | OUTPATIENT
Start: 2020-02-23 | End: 2020-03-23 | Stop reason: SDUPTHER

## 2020-02-23 RX ADMIN — METRONIDAZOLE 500 MG: 500 INJECTION, SOLUTION INTRAVENOUS at 05:02

## 2020-02-23 RX ADMIN — SERTRALINE HYDROCHLORIDE 150 MG: 50 TABLET ORAL at 08:02

## 2020-02-23 RX ADMIN — HYDRALAZINE HYDROCHLORIDE 10 MG: 20 INJECTION INTRAMUSCULAR; INTRAVENOUS at 06:02

## 2020-02-23 RX ADMIN — PNEUMOCOCCAL 13-VALENT CONJUGATE VACCINE 0.5 ML: 2.2; 2.2; 2.2; 2.2; 2.2; 4.4; 2.2; 2.2; 2.2; 2.2; 2.2; 2.2; 2.2 INJECTION, SUSPENSION INTRAMUSCULAR at 02:02

## 2020-02-23 RX ADMIN — METRONIDAZOLE 500 MG: 500 INJECTION, SOLUTION INTRAVENOUS at 12:02

## 2020-02-23 RX ADMIN — CEFTRIAXONE 1 G: 1 INJECTION, SOLUTION INTRAVENOUS at 12:02

## 2020-02-23 NOTE — PLAN OF CARE
POC discussed with patient and wife.   Wife verbalized understanding.   NSR to Sinus Sergio on monitor.  VSS.  AAO X 1 to person.   Patient restless last night. Received Klonopin PRN dose.   Wife refused to allow patient to have Morphine. States that it makes him worse.  Restless during the night. Kicking at staff. Sitter at bedside.  Voids per brief.   Turns independently in bed.   Fall precautions in place.   Side rails up X2.    Call bell on, working and within reach.   Bed locked in low position.   Remains free from falls/injuries.   Denies needs at this time.   Will continue to monitor.

## 2020-02-23 NOTE — DISCHARGE SUMMARY
[facility] Hospital Medicine  Discharge Summary      Patient Name: Santos Morales  MRN: [id number]  Admission Date: 2/19/2020  Hospital Length of Stay: 2 days  Discharge Date and Time: No discharge date for patient encounter.  Attending Physician: Vinod Mcallister MD   Discharging Provider: Vinod Mcallister MD  Primary Care Provider: Diane Bowen MD      HPI:   Mr. Morales is an elderly 84-year-old  male with PMH significant for atrial fibrillation, currently anticoagulated on apixaban, dementia, presented to the ED after sustaining a fall onto his left side.  According to the patient's wife, patient has chronic bradycardia, heart rate is all within the 30s to 40s, but declined pacemaker placement in the past.  Denies syncopal episode today.  Does not like to use walkers or canes (according to the spouse).  He started complaining of left rib pain and was brought to the ED via ambulance.  Imaging studies revealed large left-sided loculated pleural effusion with old left posterior 8th rib fracture.  Laboratory workup reveals elevated BNP of 1500, creatinine 1.5 (baseline around 1.2 few years ago).  Patient is currently comfortable, complaining of minimal pain.    Admit diagnosis left loculated pleural effusion versus empyema.    * No surgery found *      Hospital Course:   Patient was admitted for left-sided pleural effusion.  Pulmonology was consulted on the case.  Due to end-stage dementia palliative Care was also consulted on the case to discuss hospice versus home with hospice.  Wife reports that daughter declined hospice at this time however wife is willing to go home with home health.    Family decided against thoracentesis, HR in the 40s-50s. Patient's HR runs chronically low. Declined pacemaker. Wife complains of cough for the past 2-3 weeks. Will start on rocephin and flagyl empirically for possible empyema. Ambien given once for insomnia. Wife states patient has not sleep but 2 hours in the  past 2 nights. Trazodone and seroquel ineffective thus far.     On the day of discharge daughter requested for physical therapy to evaluate patient for any home needs.  Wife however declined physical therapy inpatient as she requested to go home and have physical therapy evaluate patient there with home health. Order for home health was placed.  Patient was afebrile and leukocytosis had resolved.  Decision was made to switch over to oral Augmentin twice daily to continue for the next 5 days.  Hospice was once again discussed with wife reports that this time she is not ready for hospice yet today we decide once he gets home on the plan of action going for.  All questions answered patient was stable for discharge.     Consults:   Consults (From admission, onward)        Status Ordering Provider     Inpatient consult to Cardiology  Once     Provider:  Jason Will MD    Completed ANGELO OLIVERA     Inpatient consult to Cardiothoracic Surgery  Once     Provider:  Francisco Hogan MD    Acknowledged JAMES SHIPMAN     Inpatient consult to Palliative Care  Once     Provider:  Jaycee Dubose PA-C    Completed SHAZIA MOROCHO     Inpatient consult to Pulmonology  Once     Provider:  Shazia Morocho MD    Completed JAMES SHIPMAN          No new Assessment & Plan notes have been filed under this hospital service since the last note was generated.  Service: Hospital Medicine    Final Active Diagnoses:    Diagnosis Date Noted POA    PRINCIPAL PROBLEM:  Chronic diastolic heart failure [I50.32] 04/18/2019 Yes    Loculated pleural effusion [J90] 02/20/2020 Yes    Chronic congestive heart failure [I50.9] 02/20/2020 Yes    Atrial fibrillation, persistent [I48.19] 04/18/2019 Yes     Chronic    CKD (chronic kidney disease) stage 3, GFR 30-59 ml/min [N18.3] 08/23/2018 Yes    Essential hypertension [I10] 03/25/2015 Yes      Problems Resolved During this Admission:    Diagnosis Date Noted Date Resolved POA     Empyema, left [J86.9] 02/21/2020 02/23/2020 Yes    Empyema, left [J86.9] 02/20/2020 02/21/2020 Yes    Symptomatic bradycardia [R00.1] 04/09/2019 02/23/2020 Yes       Discharged Condition: good    Disposition: Home-Health Care Svc    Follow Up:  Follow-up Information     Diane Bowen MD In 1 week.    Specialty:  Family Medicine  Contact information:  [address] [place] [id number] [phone]                 Patient Instructions:      Diet Cardiac       Significant Diagnostic Studies:   Results for orders placed or performed during the hospital encounter of 02/19/20   CBC auto differential   Result Value Ref Range    WBC 6.60 3.90 - 12.70 K/uL    RBC 4.12 (L) 4.60 - 6.20 M/uL    Hemoglobin 10.9 (L) 14.0 - 18.0 g/dL    Hematocrit 34.5 (L) 40.0 - 54.0 %    Mean Corpuscular Volume 84 82 - 98 fL    Mean Corpuscular Hemoglobin 26.5 (L) 27.0 - 31.0 pg    Mean Corpuscular Hemoglobin Conc 31.6 (L) 32.0 - 36.0 g/dL    RDW 15.2 (H) 11.5 - 14.5 %    Platelets 252 150 - 350 K/uL    MPV 10.4 9.2 - 12.9 fL    Immature Granulocytes 0.3 0.0 - 0.5 %    Gran # (ANC) 4.8 1.8 - 7.7 K/uL    Immature Grans (Abs) 0.02 0.00 - 0.04 K/uL    Lymph # 1.2 1.0 - 4.8 K/uL    Mono # 0.5 0.3 - 1.0 K/uL    Eos # 0.1 0.0 - 0.5 K/uL    Baso # 0.03 0.00 - 0.20 K/uL    nRBC 0 0 /100 WBC    Gran% 72.5 38.0 - 73.0 %    Lymph% 18.0 18.0 - 48.0 %    Mono% 7.0 4.0 - 15.0 %    Eosinophil% 1.7 0.0 - 8.0 %    Basophil% 0.5 0.0 - 1.9 %    Differential Method Automated    Comprehensive metabolic panel   Result Value Ref Range    Sodium 139 136 - 145 mmol/L    Potassium 4.7 3.5 - 5.1 mmol/L    Chloride 107 95 - 110 mmol/L    CO2 27 23 - 29 mmol/L    Glucose 109 70 - 110 mg/dL    BUN, Bld 33 (H) 8 - 23 mg/dL    Creatinine 1.5 (H) 0.5 - 1.4 mg/dL    Calcium 9.1 8.7 - 10.5 mg/dL    Total Protein 6.6 6.0 - 8.4 g/dL    Albumin 3.0 (L) 3.5 - 5.2 g/dL    Total Bilirubin 0.4 0.1 - 1.0 mg/dL    Alkaline Phosphatase 177 (H) 55 - 135 U/L    AST  17 10 - 40 U/L    ALT 15 10 - 44 U/L    Anion Gap 5 (L) 8 - 16 mmol/L    eGFR if African American 49 (A) >60 mL/min/1.73 m^2    eGFR if non African American 42 (A) >60 mL/min/1.73 m^2   Brain natriuretic peptide   Result Value Ref Range    BNP 1,542 (H) 0 - 99 pg/mL   Lactic acid, plasma   Result Value Ref Range    Lactate (Lactic Acid) 0.9 0.5 - 2.2 mmol/L   CBC auto differential   Result Value Ref Range    WBC 7.96 3.90 - 12.70 K/uL    RBC 4.36 (L) 4.60 - 6.20 M/uL    Hemoglobin 11.5 (L) 14.0 - 18.0 g/dL    Hematocrit 36.2 (L) 40.0 - 54.0 %    Mean Corpuscular Volume 83 82 - 98 fL    Mean Corpuscular Hemoglobin 26.4 (L) 27.0 - 31.0 pg    Mean Corpuscular Hemoglobin Conc 31.8 (L) 32.0 - 36.0 g/dL    RDW 14.8 (H) 11.5 - 14.5 %    Platelets 236 150 - 350 K/uL    MPV 10.2 9.2 - 12.9 fL    Immature Granulocytes 0.3 0.0 - 0.5 %    Gran # (ANC) 5.6 1.8 - 7.7 K/uL    Immature Grans (Abs) 0.02 0.00 - 0.04 K/uL    Lymph # 1.5 1.0 - 4.8 K/uL    Mono # 0.7 0.3 - 1.0 K/uL    Eos # 0.1 0.0 - 0.5 K/uL    Baso # 0.05 0.00 - 0.20 K/uL    nRBC 0 0 /100 WBC    Gran% 70.3 38.0 - 73.0 %    Lymph% 18.6 18.0 - 48.0 %    Mono% 8.4 4.0 - 15.0 %    Eosinophil% 1.8 0.0 - 8.0 %    Basophil% 0.6 0.0 - 1.9 %    Differential Method Automated    Basic metabolic panel   Result Value Ref Range    Sodium 138 136 - 145 mmol/L    Potassium 4.5 3.5 - 5.1 mmol/L    Chloride 107 95 - 110 mmol/L    CO2 25 23 - 29 mmol/L    Glucose 91 70 - 110 mg/dL    BUN, Bld 24 (H) 8 - 23 mg/dL    Creatinine 1.0 0.5 - 1.4 mg/dL    Calcium 9.0 8.7 - 10.5 mg/dL    Anion Gap 6 (L) 8 - 16 mmol/L    eGFR if African American >60 >60 mL/min/1.73 m^2    eGFR if non African American >60 >60 mL/min/1.73 m^2   Magnesium   Result Value Ref Range    Magnesium 1.9 1.6 - 2.6 mg/dL   CBC auto differential   Result Value Ref Range    WBC 13.17 (H) 3.90 - 12.70 K/uL    RBC 4.56 (L) 4.60 - 6.20 M/uL    Hemoglobin 12.0 (L) 14.0 - 18.0 g/dL    Hematocrit 38.3 (L) 40.0 - 54.0 %    Mean  Corpuscular Volume 84 82 - 98 fL    Mean Corpuscular Hemoglobin 26.3 (L) 27.0 - 31.0 pg    Mean Corpuscular Hemoglobin Conc 31.3 (L) 32.0 - 36.0 g/dL    RDW 15.2 (H) 11.5 - 14.5 %    Platelets 293 150 - 350 K/uL    MPV 10.2 9.2 - 12.9 fL    Immature Granulocytes 0.4 0.0 - 0.5 %    Gran # (ANC) 10.3 (H) 1.8 - 7.7 K/uL    Immature Grans (Abs) 0.05 (H) 0.00 - 0.04 K/uL    Lymph # 1.6 1.0 - 4.8 K/uL    Mono # 1.1 (H) 0.3 - 1.0 K/uL    Eos # 0.1 0.0 - 0.5 K/uL    Baso # 0.05 0.00 - 0.20 K/uL    nRBC 0 0 /100 WBC    Gran% 78.0 (H) 38.0 - 73.0 %    Lymph% 12.2 (L) 18.0 - 48.0 %    Mono% 8.0 4.0 - 15.0 %    Eosinophil% 1.0 0.0 - 8.0 %    Basophil% 0.4 0.0 - 1.9 %    Differential Method Automated    Basic metabolic panel   Result Value Ref Range    Sodium 136 136 - 145 mmol/L    Potassium 4.2 3.5 - 5.1 mmol/L    Chloride 104 95 - 110 mmol/L    CO2 25 23 - 29 mmol/L    Glucose 119 (H) 70 - 110 mg/dL    BUN, Bld 21 8 - 23 mg/dL    Creatinine 1.0 0.5 - 1.4 mg/dL    Calcium 9.2 8.7 - 10.5 mg/dL    Anion Gap 7 (L) 8 - 16 mmol/L    eGFR if African American >60 >60 mL/min/1.73 m^2    eGFR if non African American >60 >60 mL/min/1.73 m^2   CBC auto differential   Result Value Ref Range    WBC 9.04 3.90 - 12.70 K/uL    RBC 4.58 (L) 4.60 - 6.20 M/uL    Hemoglobin 11.8 (L) 14.0 - 18.0 g/dL    Hematocrit 37.7 (L) 40.0 - 54.0 %    Mean Corpuscular Volume 82 82 - 98 fL    Mean Corpuscular Hemoglobin 25.8 (L) 27.0 - 31.0 pg    Mean Corpuscular Hemoglobin Conc 31.3 (L) 32.0 - 36.0 g/dL    RDW 15.0 (H) 11.5 - 14.5 %    Platelets 315 150 - 350 K/uL    MPV 10.3 9.2 - 12.9 fL    Immature Granulocytes 0.3 0.0 - 0.5 %    Gran # (ANC) 6.5 1.8 - 7.7 K/uL    Immature Grans (Abs) 0.03 0.00 - 0.04 K/uL    Lymph # 1.5 1.0 - 4.8 K/uL    Mono # 0.8 0.3 - 1.0 K/uL    Eos # 0.2 0.0 - 0.5 K/uL    Baso # 0.04 0.00 - 0.20 K/uL    nRBC 0 0 /100 WBC    Gran% 71.7 38.0 - 73.0 %    Lymph% 16.4 (L) 18.0 - 48.0 %    Mono% 9.3 4.0 - 15.0 %    Eosinophil% 1.9 0.0 -  8.0 %    Basophil% 0.4 0.0 - 1.9 %    Differential Method Automated    Basic metabolic panel   Result Value Ref Range    Sodium 138 136 - 145 mmol/L    Potassium 4.3 3.5 - 5.1 mmol/L    Chloride 104 95 - 110 mmol/L    CO2 27 23 - 29 mmol/L    Glucose 87 70 - 110 mg/dL    BUN, Bld 23 8 - 23 mg/dL    Creatinine 1.0 0.5 - 1.4 mg/dL    Calcium 9.2 8.7 - 10.5 mg/dL    Anion Gap 7 (L) 8 - 16 mmol/L    eGFR if African American >60 >60 mL/min/1.73 m^2    eGFR if non African American >60 >60 mL/min/1.73 m^2   Echo Color Flow Doppler? Yes   Result Value Ref Range    STJ 3.45 cm    AV mean gradient 12 mmHg    Ao peak jean 2.53 m/s    Ao VTI 45.46 cm    IVS 1.58 (A) 0.6 - 1.1 cm    LA size 5.62 cm    Left Atrium Major Axis 7.48 cm    Left Atrium Minor Axis 6.58 cm    LVIDD 6.03 (A) 3.5 - 6.0 cm    LVIDS 4.07 (A) 2.1 - 4.0 cm    LVOT diameter 2.29 cm    LVOT peak VTI 31.86 cm    PW 1.16 (A) 0.6 - 1.1 cm    MV Peak A Jean 0.32 m/s    E wave decelartion time 129.21 msec    MV Peak E Jean 0.82 m/s    RA Major Axis 7.22 cm    RA Width 5.50 cm    Sinus 4.27 cm    TAPSE 2.27 cm    TR Max Jean 3.30 m/s    TDI LATERAL 0.08 m/s    TDI SEPTAL 0.06 m/s    LA WIDTH 5.68 cm    Ao root annulus 4.43 cm    LV Diastolic Volume 181.80 mL    LV Systolic Volume 72.92 mL    LVOT peak jean 1.77 m/s    LV LATERAL E/E' RATIO 10.25 m/s    LV SEPTAL E/E' RATIO 13.67 m/s    FS 33 %    LA volume 189.97 cm3    LV mass 379.38 g    Left Ventricle Relative Wall Thickness 0.38 cm    AV valve area 2.89 cm2    AV Velocity Ratio 0.70     AV index (prosthetic) 0.70     E/A ratio 2.56     Mean e' 0.07 m/s    LVOT area 4.1 cm2    LVOT stroke volume 131.16 cm3    AV peak gradient 26 mmHg    E/E' ratio 11.71 m/s    LV Systolic Volume Index 37.3 mL/m2    LV Diastolic Volume Index 93.02 mL/m2    LA Volume Index 97.2 mL/m2    LV Mass Index 194 g/m2    Triscuspid Valve Regurgitation Peak Gradient 44 mmHg    BSA 1.98 m2        Pending Diagnostic Studies:     Procedure Component  Value Units Date/Time    Echo Color Flow Doppler? Yes [[id number]]  (Abnormal) Resulted:  02/23/20 1113    Order Status:  Sent Lab Status:  In process Updated:  02/23/20 1113     STJ 3.45 cm      AV mean gradient 12 mmHg      Ao peak jean 2.53 m/s      Ao VTI 45.46 cm      IVS 1.58 cm      LA size 5.62 cm      Left Atrium Major Axis 7.48 cm      Left Atrium Minor Axis 6.58 cm      LVIDD 6.03 cm      LVIDS 4.07 cm      LVOT diameter 2.29 cm      LVOT peak VTI 31.86 cm      PW 1.16 cm      MV Peak A Jean 0.32 m/s      E wave decelartion time 129.21 msec      MV Peak E Jean 0.82 m/s      RA Major Axis 7.22 cm      RA Width 5.50 cm      Sinus 4.27 cm      TAPSE 2.27 cm      TR Max Jean 3.30 m/s      TDI LATERAL 0.08 m/s      TDI SEPTAL 0.06 m/s      LA WIDTH 5.68 cm      Ao root annulus 4.43 cm      LV Diastolic Volume 181.80 mL      LV Systolic Volume 72.92 mL      LVOT peak jean 1.77 m/s      LV LATERAL E/E' RATIO 10.25 m/s      LV SEPTAL E/E' RATIO 13.67 m/s      FS 33 %      LA volume 189.97 cm3      LV mass 379.38 g      Left Ventricle Relative Wall Thickness 0.38 cm      AV valve area 2.89 cm2      AV Velocity Ratio 0.70     AV index (prosthetic) 0.70     E/A ratio 2.56     Mean e' 0.07 m/s      LVOT area 4.1 cm2      LVOT stroke volume 131.16 cm3      AV peak gradient 26 mmHg      E/E' ratio 11.71 m/s      LV Systolic Volume Index 37.3 mL/m2      LV Diastolic Volume Index 93.02 mL/m2      LA Volume Index 97.2 mL/m2      LV Mass Index 194 g/m2      Triscuspid Valve Regurgitation Peak Gradient 44 mmHg      BSA 1.98 m2          Medications:  Reconciled Home Medications:      Medication List      START taking these medications    amoxicillin-clavulanate 875-125mg 875-125 mg per tablet  Commonly known as:  AUGMENTIN  Take 1 tablet by mouth 2 (two) times daily. for 5 days  Start taking on:  February 24, 2020     QUEtiapine 50 MG tablet  Commonly known as:  SEROQUEL  Take 1 tablet (50 mg total) by mouth every evening.         CONTINUE taking these medications    candesartan 8 MG tablet  Commonly known as:  ATACAND  Take 2 tablets (16 mg total) by mouth once daily.     clonazePAM 0.5 MG tablet  Commonly known as:  KLONOPIN  Take 1 tablet (0.5 mg total) by mouth every evening.     furosemide 40 MG tablet  Commonly known as:  LASIX  Take 1 tablet (40 mg total) by mouth daily as needed. Take next 3 days     isosorbide mononitrate 60 MG 24 hr tablet  Commonly known as:  IMDUR  Take 1 tablet (60 mg total) by mouth once daily.     melatonin 1 mg Tab  Take 1 tablet by mouth every evening.     * sertraline 100 MG tablet  Commonly known as:  ZOLOFT  Take 1 tablet (100 mg total) by mouth once daily.     * sertraline 50 MG tablet  Commonly known as:  ZOLOFT  TAKE 1 TABLET BY MOUTH ONCE DAILY FOR A TOTAL OF 150MG PER DAY.     spironolactone 25 MG tablet  Commonly known as:  ALDACTONE  TAKE 1 TABLET BY MOUTH ONCE DAILY         * This list has 2 medication(s) that are the same as other medications prescribed for you. Read the directions carefully, and ask your doctor or other care provider to review them with you.            STOP taking these medications    apixaban 5 mg Tab  Commonly known as:  ELIQUIS     traZODone 150 MG tablet  Commonly known as:  DESYREL            Indwelling Lines/Drains at time of discharge:   Lines/Drains/Airways     None                 Time spent on the discharge of patient: 40 minutes  Patient was seen and examined on the date of discharge and determined to be suitable for discharge.         Vinod Mcallister MD  Department of Hospital Medicine  [facility]

## 2020-02-23 NOTE — PLAN OF CARE
Michelet sent discharge orders and home health orders to [facility] via [facility]. Michelet called on call nurse Christopher to inform on pt's discharge. No further needs at this time.

## 2020-02-23 NOTE — PROGRESS NOTES
[facility]   Pulmonology  Progress Note    Patient Name: Santos Morales  MRN: [id number]  Admission Date: 2/19/2020  Hospital Length of Stay: 2 days  Code Status: DNR  Attending Provider: Vinod Mcallister MD  Primary Care Provider: Diane Bowen MD   Principal Problem: Chronic diastolic heart failure    Subjective:     Interval History:   2/23 seen and examined.  Wife at bedside.  On room air.  No distress. On one-to-one.  Agitated at times.    Objective:     Vital Signs (Most Recent):  Temp: 97.9 °F (36.6 °C) (02/23/20 0705)  Pulse: (!) 54 (02/23/20 1000)  Resp: 20 (02/23/20 0705)  BP: (!) 160/56 (02/23/20 1000)  SpO2: 96 % (02/23/20 0705) Vital Signs (24h Range):  Temp:  [97.3 °F (36.3 °C)-97.9 °F (36.6 °C)] 97.9 °F (36.6 °C)  Pulse:  [49-73] 54  Resp:  [18-20] 20  SpO2:  [96 %] 96 %  BP: (160-210)/(48-87) 160/56     Weight: 81.6 kg (180 lb)  Body mass index is 27.37 kg/m².      Intake/Output Summary (Last 24 hours) at 2/23/2020 1146  Last data filed at 2/23/2020 0600  Gross per 24 hour   Intake 790 ml   Output --   Net 790 ml       Physical Exam   Constitutional: He appears well-developed and well-nourished. No distress.   HENT:   Head: Normocephalic and atraumatic.   Hard of hearing.   Eyes: EOM are normal.   Neck: Neck supple.   Cardiovascular:   Bradycardic irregular S1-S2   Pulmonary/Chest: Effort normal. No respiratory distress.   Breath sounds decreased at bases  Decreased on the left more than the right.   Abdominal: Soft. There is no tenderness.   Musculoskeletal: He exhibits no edema or deformity.   Neurological: He is alert.   Confused, follows commands at times.   Psychiatric:   Dementia    Nursing note and vitals reviewed.      Vents:       Lines/Drains/Airways     Peripheral Intravenous Line                 Peripheral IV - Single Lumen 02/22/20 1349 22 G Anterior;Right Upper Arm less than 1 day                Significant Labs:    CBC/Anemia Profile:  Recent Labs   Lab 02/22/20  0842  02/23/20  0541   WBC 13.17* 9.04   HGB 12.0* 11.8*   HCT 38.3* 37.7*    315   MCV 84 82   RDW 15.2* 15.0*        Chemistries:  Recent Labs   Lab 02/22/20  0837 02/23/20  0541    138   K 4.2 4.3    104   CO2 25 27   BUN 21 23   CREATININE 1.0 1.0   CALCIUM 9.2 9.2           Significant Imaging:  CXR: I have reviewed all pertinent results/findings within the past 24 hours and my personal findings are:  Left-sided effusion stable.      ABG  No results for input(s): PH, PO2, PCO2, HCO3, BE in the last 168 hours.  Assessment/Plan:     Chronic congestive heart failure  Resume Arb, Lasix, spironolactone, and nitrates as OP.  BNP 1500.  2/21 off meds now   2/23 discussed with patient wife and hospice today home hospice.    Loculated pleural effusion  Off Eliquis for thoracentesis Saturday 2/22.  Called wife on the phone.  She seems agreeable for procedure.?  Fall related effusion versus CHF.  Patient BNP is 1500.  2/21 CXR today . Possible thora in am . Wife a bit hesitant about procedure .   2/22 will not proceed with thoracentesis.  Discussed with wife.  2/23 no thoracentesis planned.    Atrial fibrillation, persistent  Off Eliquis for thoracentesis Saturday 2/22 2/21 off AC . Hx of falls . Possible thora in am . Wife reluctant   2/22 note thoracentesis.  Frequent falls.  Ribs fracture.  84-year-old male with dementia, dependent at home with ADL.  Benefits of anticoagulation do not outweigh risks.  I recommend to keep off anticoagulation.  Discussed with Cardiology.  2/23 off anticoagulation.  Frequent falls /ribs fractures/dementia advanced     Will sign off.  Please recall as needed.       Shazia Morocho MD  Pulmonology  [facility]

## 2020-02-23 NOTE — ASSESSMENT & PLAN NOTE
Resume Arb, Lasix, spironolactone, and nitrates as OP.  BNP 1500.  2/21 off meds now   2/23 discussed with patient wife and hospice today home hospice.

## 2020-02-23 NOTE — ASSESSMENT & PLAN NOTE
Off Eliquis for thoracentesis Saturday 2/22.  Called wife on the phone.  She seems agreeable for procedure.?  Fall related effusion versus CHF.  Patient BNP is 1500.  2/21 CXR today . Possible thora in am . Wife a bit hesitant about procedure .   2/22 will not proceed with thoracentesis.  Discussed with wife.  2/23 no thoracentesis planned.

## 2020-02-23 NOTE — SUBJECTIVE & OBJECTIVE
Interval History:   2/23 seen and examined.  Wife at bedside.  On room air.  No distress. On one-to-one.  Agitated at times.    Objective:     Vital Signs (Most Recent):  Temp: 97.9 °F (36.6 °C) (02/23/20 0705)  Pulse: (!) 54 (02/23/20 1000)  Resp: 20 (02/23/20 0705)  BP: (!) 160/56 (02/23/20 1000)  SpO2: 96 % (02/23/20 0705) Vital Signs (24h Range):  Temp:  [97.3 °F (36.3 °C)-97.9 °F (36.6 °C)] 97.9 °F (36.6 °C)  Pulse:  [49-73] 54  Resp:  [18-20] 20  SpO2:  [96 %] 96 %  BP: (160-210)/(48-87) 160/56     Weight: 81.6 kg (180 lb)  Body mass index is 27.37 kg/m².      Intake/Output Summary (Last 24 hours) at 2/23/2020 1146  Last data filed at 2/23/2020 0600  Gross per 24 hour   Intake 790 ml   Output --   Net 790 ml       Physical Exam   Constitutional: He appears well-developed and well-nourished. No distress.   HENT:   Head: Normocephalic and atraumatic.   Hard of hearing.   Eyes: EOM are normal.   Neck: Neck supple.   Cardiovascular:   Bradycardic irregular S1-S2   Pulmonary/Chest: Effort normal. No respiratory distress.   Breath sounds decreased at bases  Decreased on the left more than the right.   Abdominal: Soft. There is no tenderness.   Musculoskeletal: He exhibits no edema or deformity.   Neurological: He is alert.   Confused, follows commands at times.   Psychiatric:   Dementia    Nursing note and vitals reviewed.      Vents:       Lines/Drains/Airways     Peripheral Intravenous Line                 Peripheral IV - Single Lumen 02/22/20 1349 22 G Anterior;Right Upper Arm less than 1 day                Significant Labs:    CBC/Anemia Profile:  Recent Labs   Lab 02/22/20  0837 02/23/20  0541   WBC 13.17* 9.04   HGB 12.0* 11.8*   HCT 38.3* 37.7*    315   MCV 84 82   RDW 15.2* 15.0*        Chemistries:  Recent Labs   Lab 02/22/20  0837 02/23/20  0541    138   K 4.2 4.3    104   CO2 25 27   BUN 21 23   CREATININE 1.0 1.0   CALCIUM 9.2 9.2           Significant Imaging:  CXR: I have reviewed  all pertinent results/findings within the past 24 hours and my personal findings are:  Left-sided effusion stable.

## 2020-02-23 NOTE — PT/OT/SLP PROGRESS
Occupational Therapy      Patient Name:  Santos Morales   MRN:  [id number]    Patient not seen today secondary to PATIENT AND SPOUSE ANTICIPATING DISCHARGE TODAY AND DECLINED ACUTE CARE THERAPY AT THIS TIME.  . ALL NEEDS MET AND PATIENT LEFT WITH SPOUSE AND NURSE MEI PRESENT.     Aaliyah Cano OT  2/23/2020

## 2020-02-23 NOTE — NURSING
Discharge instructions and medications reviewed with pt and family, family verbalized understanding of instructions given  PIV and Telemetry removed, pt tip well, denied any discomfort and appears to be in no distress at this time  Pt left floor with family via wheelchair per hospital staff

## 2020-02-23 NOTE — PLAN OF CARE
02/23/20 1344   Post-Acute Status   Post-Acute Authorization Home Health/Hospice   Post-Acute Placement Status Set-up Complete

## 2020-02-23 NOTE — PLAN OF CARE
02/23/20 1344   Final Note   Assessment Type Final Discharge Note   Anticipated Discharge Disposition Home-Health   Right Care Referral Info   Post Acute Recommendation Home-care   Facility Name [facility]   Post-Acute Status   Post-Acute Authorization Home Health/Hospice   Post-Acute Placement Status Set-up Complete

## 2020-02-23 NOTE — PT/OT/SLP PROGRESS
Physical Therapy      Patient Name:  Santos Morales   MRN:  [id number]    Patient/wife declined in-hospital PT r/t planning to go home today and reports will have  services. D/C PT    Idris Cool PT

## 2020-02-23 NOTE — ASSESSMENT & PLAN NOTE
Off Eliquis for thoracentesis Saturday 2/22 2/21 off AC . Hx of falls . Possible thora in am . Wife reluctant   2/22 note thoracentesis.  Frequent falls.  Ribs fracture.  84-year-old male with dementia, dependent at home with ADL.  Benefits of anticoagulation do not outweigh risks.  I recommend to keep off anticoagulation.  Discussed with Cardiology.  2/23 off anticoagulation.  Frequent falls /ribs fractures/dementia advanced

## 2020-02-25 ENCOUNTER — PATIENT OUTREACH (OUTPATIENT)
Dept: ADMINISTRATIVE | Facility: CLINIC | Age: 85
End: 2020-02-25

## 2020-02-25 NOTE — PROGRESS NOTES
Dania Jeffrey RN attempted to contact patient. The following occurred:   C3 nurse attempted to contact Santos Morales for a TCC post hospital discharge follow up call. The patient is unable to conduct the call @ this time. The patient's wife requested a callback.    The patient has a scheduled \[facility] [place] [facility]\"" appointment with Diane Bowen MD on 2/27/20 @ [id number]. Message not sent to Physician staff.

## 2020-02-26 NOTE — PATIENT INSTRUCTIONS
Insuficiencia cardíaca: señales de kayy recaída  Usted tiene un trastorno llamado insuficiencia cardíaca (también llamado insuficiencia cardíaca congestiva). Kayy vez que ha tenido kayy insuficiencia cardíaca, las recaídas son posibles. A continuación encontrará las señales que pueden indicarle que kayy insuficiencia cardíaca está empeorando. Si detecta algunas de estas señales, llame a jamison proveedor de atención médica.    Hinchazón  · Se le hinchan los tobillos o la parte inferior de las piernas.  · Los zapatos le quedan demasiado apretados.  · La ropa le queda demasiado apretada en la cintura.  · Tiene dificultad para ponerse o quitarse un anillo.  Dificultad para respirar  · Se ve obligado a respirar más anjali incluso cuando está realizando ana actividades normales o descansando.  · Se despierta por la noche con dificultad para respirar o tosiendo.  · Necesita poner más almohadas o sentarse para dormir.  Otras señales de aviso  · Se siente más débil, mareado o cansado.  · Siente dolor en el pecho o tiene cambios bruscos en el ritmo cardíaco.  · Tiene kayy tos que no desaparece.  · Tiene dificultades para recordar cosas o pérdida de apetito.  Vigile jamison peso  El aumento de peso suele ser el primer signo de advertencia de que la insuficiencia cardíaca está empeorando. Incluso aumentar unas pocas libras puede ser signo de que jamison cuerpo está reteniendo un exceso de agua y sal. Pesarse todos los días es la mejor manera de saber si está reteniendo agua. Jamison proveedor de atención médica le mostrará cómo hacer un seguimiento de jamison peso. Llame a jamison médico si aumenta más de 2 libras [1 kg] en un día, 5 libras [3 kg] en kayy semana, o el aumento de peso que jamison médico le dijo que debía informar. [place] suele ser un signo de que jamison insuficiencia cardíaca está empeorando. Jamison médico le dirá qué hacer a continuación.  Date Last Reviewed: 3/15/2016  © 4646-3905 [employer]. [address] [place] [id number]. Todos  los derechos reservados. Esta información no pretende sustituir la atención médica profesional. Sólo jamison médico puede diagnosticar y tratar un problema de bethanie.

## 2020-02-27 ENCOUNTER — OFFICE VISIT (OUTPATIENT)
Dept: FAMILY MEDICINE | Facility: CLINIC | Age: 85
End: 2020-02-27
Attending: FAMILY MEDICINE
Payer: MEDICARE

## 2020-02-27 VITALS
HEIGHT: 68 IN | BODY MASS INDEX: 25.77 KG/M2 | TEMPERATURE: 98 F | SYSTOLIC BLOOD PRESSURE: 140 MMHG | DIASTOLIC BLOOD PRESSURE: 80 MMHG | WEIGHT: 170.06 LBS

## 2020-02-27 DIAGNOSIS — F41.9 ANXIETY: ICD-10-CM

## 2020-02-27 DIAGNOSIS — J90 PLEURAL EFFUSION ON LEFT: ICD-10-CM

## 2020-02-27 DIAGNOSIS — F03.91 DEMENTIA WITH BEHAVIORAL DISTURBANCE, UNSPECIFIED DEMENTIA TYPE: ICD-10-CM

## 2020-02-27 DIAGNOSIS — R00.1 BRADYCARDIA: ICD-10-CM

## 2020-02-27 DIAGNOSIS — F32.1 CURRENT MODERATE EPISODE OF MAJOR DEPRESSIVE DISORDER WITHOUT PRIOR EPISODE: Primary | ICD-10-CM

## 2020-02-27 PROCEDURE — 99495 TCM SERVICES (MODERATE COMPLEXITY): ICD-10-PCS | Mod: S$GLB,,, | Performed by: FAMILY MEDICINE

## 2020-02-27 PROCEDURE — 99999 PR PBB SHADOW E&M-EST. PATIENT-LVL V: ICD-10-PCS | Mod: PBBFAC,,, | Performed by: FAMILY MEDICINE

## 2020-02-27 PROCEDURE — 99999 PR PBB SHADOW E&M-EST. PATIENT-LVL V: CPT | Mod: PBBFAC,,, | Performed by: FAMILY MEDICINE

## 2020-02-27 PROCEDURE — 99495 TRANSJ CARE MGMT MOD F2F 14D: CPT | Mod: S$GLB,,, | Performed by: FAMILY MEDICINE

## 2020-02-27 RX ORDER — CLONAZEPAM 1 MG/1
1 TABLET ORAL NIGHTLY
Qty: 30 TABLET | Refills: 2 | Status: SHIPPED | OUTPATIENT
Start: 2020-02-27 | End: 2020-06-10

## 2020-02-27 NOTE — PROGRESS NOTES
Subjective:       Patient ID: Santos Morales is a 84 y.o. male.    Chief Complaint: Hospital Follow Up      Transitional Care Note    Family and/or Caretaker present at visit?  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 He was found to have left pleural effusion and left 8th rib fractures. BNP and Cre were also elevated. Family declined thoracentesis , treated  empirically for empyema with rocephin and flagyl . HR was also noted to be persistently low. They have declined pacemaker. Discharged  2 d later with home health  since they also declined palliative / hospice care . Doing fair . He has to be fed . Independent with hygiene  and using restroom . Refuses  to use cane or walker . He has an appointment  with Dr Frey. Sleeps 2 hrs nightly  . He has slept more with clonopin . Seroquel is not helping . He likes to go to back porch . No wandering . He also needs assistance to get dress . He has not displayed belligerence behavior . Wife  Agrees to have him see Psychiatry , palliative medicine and neurology so when the time to sell property to take care of his medical needs comes  they can have doctors note deeming him incapable of making financial decisions     Review of Systems   Constitutional: Positive for appetite change and unexpected weight change.   HENT: Negative.    Eyes: Negative.    Respiratory: Negative.    Cardiovascular: Negative.    Gastrointestinal: Negative.    Genitourinary: Negative.    Musculoskeletal: Negative.    Skin: Negative.    Hematological: Negative.     Psychiatric/Behavioral: Positive for sleep disturbance.       Objective:      Physical Exam   Constitutional: He is oriented to person, place, and time. He appears well-developed and well-nourished. No distress.   HENT:   Head: Normocephalic and atraumatic.   Right Ear: External ear normal.   Left Ear: External ear normal.   Nose: Nose normal.   Mouth/Throat: No oropharyngeal exudate.   Eyes: Pupils are equal, round, and reactive to light. Conjunctivae and EOM are normal. Right eye exhibits no discharge. Left eye exhibits no discharge. No scleral icterus.   Neck: Normal range of motion. Neck supple. No JVD present. No tracheal deviation present. No thyromegaly present.   Cardiovascular: Normal rate, regular rhythm, normal heart sounds and intact distal pulses. Exam reveals no gallop and no friction rub.   No murmur heard.  Pulmonary/Chest: Effort normal and breath sounds normal. No stridor. No respiratory distress. He has no wheezes. He has no rales. He exhibits no tenderness.   Abdominal: Soft. Bowel sounds are normal. He exhibits no distension. There is no tenderness. There is no rebound and no guarding.   Musculoskeletal: Normal range of motion. He exhibits no edema or tenderness.   Lymphadenopathy:     He has no cervical adenopathy.   Neurological: He is alert and oriented to person, place, and time. He has normal reflexes. He displays normal reflexes. No cranial nerve deficit. He exhibits normal muscle tone. Coordination normal.   Skin: Skin is warm and dry. No rash noted. He is not diaphoretic. No erythema. No pallor.   Psychiatric: His behavior is normal. Judgment and thought content normal. His affect is blunt. Cognition and memory are impaired. He is noncommunicative.       Assessment:       1. Current moderate episode of major depressive disorder without prior episode    2. Anxiety    3. Dementia with behavioral disturbance, unspecified dementia type    4. Pleural effusion on left    5. Bradycardia         Plan:     Santos was seen today for hospital follow up.    Diagnoses and all orders for this visit:    Current moderate episode of major depressive disorder without prior episode  -     Ambulatory referral/consult to Palliative Care; Future  -     Ambulatory referral/consult to Psychiatry; Future  -     Ambulatory referral/consult to Neurology; Future    Anxiety  -     clonazePAM (KLONOPIN) 1 MG tablet; Take 1 tablet (1 mg total) by mouth every evening.  -     Ambulatory referral/consult to Palliative Care; Future  -     Ambulatory referral/consult to Psychiatry; Future  -     Ambulatory referral/consult to Neurology; Future    Dementia with behavioral disturbance, unspecified dementia type  -     Ambulatory referral/consult to Palliative Care; Future  -     Ambulatory referral/consult to Psychiatry; Future  -     Ambulatory referral/consult to Neurology; Future    Pleural effusion on left    Bradycardia     Monitor falls.   Cont HH for now  Declined PM again .  Time spent: 25 minutes in face to face discussion concerning diagnosis, prognosis, review of lab and test results, benefits of treatment as well as management of disease, counseling of patient and coordination of care between various health care providers . Greater than half the time spent was used for coordination of care and counseling of patient.

## 2020-03-04 ENCOUNTER — TELEPHONE (OUTPATIENT)
Dept: FAMILY MEDICINE | Facility: CLINIC | Age: 85
End: 2020-03-04

## 2020-03-04 DIAGNOSIS — F03.91 DEMENTIA WITH BEHAVIORAL DISTURBANCE, UNSPECIFIED DEMENTIA TYPE: Primary | ICD-10-CM

## 2020-03-04 DIAGNOSIS — I48.20 CHRONIC ATRIAL FIBRILLATION: ICD-10-CM

## 2020-03-04 DIAGNOSIS — I50.9 CONGESTIVE HEART FAILURE, UNSPECIFIED HF CHRONICITY, UNSPECIFIED HEART FAILURE TYPE: ICD-10-CM

## 2020-03-04 NOTE — TELEPHONE ENCOUNTER
----- Message from Rach Ambrose MA sent at 3/4/2020  1:32 PM CST -----  Hey you scheduled this patient for Dr. Ibarra tomorrow for palliative care . All Palliative care appointments or 40 minutes not 15 minutes . This patient needs to be rescheduled for another day . I tried calling them once didn't get a answer would you mind trying them please as I have many others to call and move ..      Thanks , Rach

## 2020-03-04 NOTE — TELEPHONE ENCOUNTER
Informed pts wife of the Palliative Care appt change. Pts wife verbalized understanding and is requesting H.H. And PT for the pt. Please advise.

## 2020-03-13 RX ORDER — SERTRALINE HYDROCHLORIDE 50 MG/1
TABLET, FILM COATED ORAL
Qty: 90 TABLET | Refills: 0 | Status: SHIPPED | OUTPATIENT
Start: 2020-03-13 | End: 2020-06-10

## 2020-03-20 RX ORDER — QUETIAPINE FUMARATE 50 MG/1
TABLET, FILM COATED ORAL
Refills: 0 | OUTPATIENT
Start: 2020-03-20

## 2020-03-23 RX ORDER — QUETIAPINE FUMARATE 50 MG/1
50 TABLET, FILM COATED ORAL NIGHTLY
Qty: 30 TABLET | Refills: 0 | Status: SHIPPED | OUTPATIENT
Start: 2020-03-23 | End: 2020-04-20

## 2020-03-23 NOTE — TELEPHONE ENCOUNTER
----- Message from Leyla Hdz sent at 3/23/2020  7:34 AM CDT -----  Contact: self/[phone]  Type:  RX Refill Request    Who Called: Santos Morales  Refill or New Rx:refill  RX Name and Strength:Quetiapine 50 mg  How is the patient currently taking it? (ex. 1XDay):once a day  Is this a 30 day or 90 day RX:30  Preferred Pharmacy with phone number:  [id number] [place] [facility] [address]  [address] [facility] [place] [address]  Phone: [phone] Fax: [phone]  Local or Mail Order:local  Ordering Provider:Dr Shah  Would the patient rather a call back or a response via [facility]? Call back   Best Call Back Number:[phone]  Additional Information: patient was given this medication in the hospital, Dr Maier is knows about it. Please call back at [phone]. Thanks/ar

## 2020-04-05 DIAGNOSIS — F32.9 MAJOR DEPRESSION, CHRONIC: ICD-10-CM

## 2020-04-06 RX ORDER — SERTRALINE HYDROCHLORIDE 100 MG/1
TABLET, FILM COATED ORAL
Qty: 90 TABLET | Refills: 0 | Status: SHIPPED | OUTPATIENT
Start: 2020-04-06 | End: 2020-07-06

## 2020-04-20 RX ORDER — QUETIAPINE FUMARATE 50 MG/1
TABLET, FILM COATED ORAL
Qty: 30 TABLET | Refills: 0 | Status: SHIPPED | OUTPATIENT
Start: 2020-04-20 | End: 2020-05-18

## 2020-05-18 RX ORDER — QUETIAPINE FUMARATE 50 MG/1
TABLET, FILM COATED ORAL
Qty: 30 TABLET | Refills: 0 | Status: SHIPPED | OUTPATIENT
Start: 2020-05-18 | End: 2020-06-10

## 2020-05-25 ENCOUNTER — TELEPHONE (OUTPATIENT)
Dept: PALLIATIVE MEDICINE | Facility: CLINIC | Age: 85
End: 2020-05-25

## 2020-05-25 NOTE — TELEPHONE ENCOUNTER
[facility] - Palliative Care  Medical Specialty       Patient Name: Santos Morales  MRN: [id number]  Primary Care Physician: Diane Bowen MD    Spoke to patient's wife to schedule in person palliative care appointment. Appt made for 5/30 at 12 with Dr. Ibarra.       Erik Ness, MSW, LCSW  [phone]

## 2020-05-28 ENCOUNTER — TELEPHONE (OUTPATIENT)
Dept: FAMILY MEDICINE | Facility: CLINIC | Age: 85
End: 2020-05-28

## 2020-05-28 NOTE — TELEPHONE ENCOUNTER
Spoke with pt's wife, Rosa, she states that pt is coughing and spitting up clear mucous. She says that pt has an appointment on Saturday with Dr. Ibarra for Palliative Care. She also says that pt is refusing to wear a mask and has tried to hit her. Informed her that Dr. Maier is not in the clinic this week but will be back tomorrow but will send message to Jeny Jang. She verbalized understanding.

## 2020-05-28 NOTE — TELEPHONE ENCOUNTER
----- Message from Kirsten Bernard sent at 5/28/2020  9:12 AM CDT -----  Contact: spouse   ..Type:  Needs Medical Advice    Who Called:  Spouse   Symptoms (please be specific):  Spitting    How long has patient had these symptoms:     Pharmacy name and phone #:  n/a  Would the patient rather a call back or a response via [facility]?  Call back   Best Call Back Number:  [phone]  Additional Information:  Spouse will like to speak with doctor  About pts condition

## 2020-05-28 NOTE — TELEPHONE ENCOUNTER
Spoke to pt and told pt that the clear mucus is a good sign and if pt starts having a fever or worsening symptoms to call us so we can gert him in to be tested for covid.

## 2020-05-29 DIAGNOSIS — I10 ESSENTIAL HYPERTENSION: ICD-10-CM

## 2020-05-29 DIAGNOSIS — I50.32 CHRONIC DIASTOLIC HEART FAILURE: ICD-10-CM

## 2020-05-30 ENCOUNTER — OFFICE VISIT (OUTPATIENT)
Dept: INTERNAL MEDICINE | Facility: CLINIC | Age: 85
End: 2020-05-30
Payer: MEDICARE

## 2020-05-30 VITALS
BODY MASS INDEX: 24.55 KG/M2 | DIASTOLIC BLOOD PRESSURE: 46 MMHG | TEMPERATURE: 98 F | SYSTOLIC BLOOD PRESSURE: 126 MMHG | WEIGHT: 171.5 LBS | HEIGHT: 70 IN | HEART RATE: 35 BPM

## 2020-05-30 DIAGNOSIS — Z20.822 EXPOSURE TO COVID-19 VIRUS: ICD-10-CM

## 2020-05-30 DIAGNOSIS — G30.1 LATE ONSET ALZHEIMER'S DISEASE WITH BEHAVIORAL DISTURBANCE: Primary | ICD-10-CM

## 2020-05-30 DIAGNOSIS — F02.818 LATE ONSET ALZHEIMER'S DISEASE WITH BEHAVIORAL DISTURBANCE: Primary | ICD-10-CM

## 2020-05-30 PROCEDURE — 1159F PR MEDICATION LIST DOCUMENTED IN MEDICAL RECORD: ICD-10-PCS | Mod: S$GLB,,, | Performed by: FAMILY MEDICINE

## 2020-05-30 PROCEDURE — 3078F DIAST BP <80 MM HG: CPT | Mod: CPTII,S$GLB,, | Performed by: FAMILY MEDICINE

## 2020-05-30 PROCEDURE — 99214 PR OFFICE/OUTPT VISIT, EST, LEVL IV, 30-39 MIN: ICD-10-PCS | Mod: 25,S$GLB,, | Performed by: FAMILY MEDICINE

## 2020-05-30 PROCEDURE — 3074F PR MOST RECENT SYSTOLIC BLOOD PRESSURE < 130 MM HG: ICD-10-PCS | Mod: CPTII,S$GLB,, | Performed by: FAMILY MEDICINE

## 2020-05-30 PROCEDURE — 99497 ADVNCD CARE PLAN 30 MIN: CPT | Mod: S$GLB,,, | Performed by: FAMILY MEDICINE

## 2020-05-30 PROCEDURE — 1100F PTFALLS ASSESS-DOCD GE2>/YR: CPT | Mod: CPTII,S$GLB,, | Performed by: FAMILY MEDICINE

## 2020-05-30 PROCEDURE — 1159F MED LIST DOCD IN RCRD: CPT | Mod: S$GLB,,, | Performed by: FAMILY MEDICINE

## 2020-05-30 PROCEDURE — 3288F FALL RISK ASSESSMENT DOCD: CPT | Mod: CPTII,S$GLB,, | Performed by: FAMILY MEDICINE

## 2020-05-30 PROCEDURE — 99497 PR ADVNCD CARE PLAN 30 MIN: ICD-10-PCS | Mod: S$GLB,,, | Performed by: FAMILY MEDICINE

## 2020-05-30 PROCEDURE — 99214 OFFICE O/P EST MOD 30 MIN: CPT | Mod: 25,S$GLB,, | Performed by: FAMILY MEDICINE

## 2020-05-30 PROCEDURE — 3074F SYST BP LT 130 MM HG: CPT | Mod: CPTII,S$GLB,, | Performed by: FAMILY MEDICINE

## 2020-05-30 PROCEDURE — 99999 PR PBB SHADOW E&M-EST. PATIENT-LVL IV: CPT | Mod: PBBFAC,,, | Performed by: FAMILY MEDICINE

## 2020-05-30 PROCEDURE — 3288F PR FALLS RISK ASSESSMENT DOCUMENTED: ICD-10-PCS | Mod: CPTII,S$GLB,, | Performed by: FAMILY MEDICINE

## 2020-05-30 PROCEDURE — 1100F PR PT FALLS ASSESS DOC 2+ FALLS/FALL W/INJURY/YR: ICD-10-PCS | Mod: CPTII,S$GLB,, | Performed by: FAMILY MEDICINE

## 2020-05-30 PROCEDURE — 99999 PR PBB SHADOW E&M-EST. PATIENT-LVL IV: ICD-10-PCS | Mod: PBBFAC,,, | Performed by: FAMILY MEDICINE

## 2020-05-30 PROCEDURE — 3078F PR MOST RECENT DIASTOLIC BLOOD PRESSURE < 80 MM HG: ICD-10-PCS | Mod: CPTII,S$GLB,, | Performed by: FAMILY MEDICINE

## 2020-05-30 RX ORDER — QUETIAPINE FUMARATE 25 MG/1
25 TABLET, FILM COATED ORAL DAILY
Qty: 30 TABLET | Refills: 11 | Status: SHIPPED | OUTPATIENT
Start: 2020-05-30 | End: 2021-05-30

## 2020-05-30 NOTE — LETTER
Jesica 3, 2020      Diane Bowen MD  [address] [address]           [facility]  [address]  [place] [facility] [address]  Phone: [phone]  Fax: [phone]          Patient: Santos Morales   MR Number: [id number]   YOB: 1935   Date of Visit: 5/30/2020       Dear Dr. Diane Bowen:    Thank you for referring Santos Morales to me for evaluation. Attached you will find relevant portions of my assessment and plan of care.    If you have questions, please do not hesitate to call me. I look forward to following Santos Morales along with you.    Sincerely,    Aretha Ibarra MD    Enclosure  CC:  No Recipients    If you would like to receive this communication electronically, please contact [email or web address] or [phone] to request more information on [facility] Link access.    For providers and/or their staff who would like to refer a patient to [facility], please contact us through our one-stop-shop provider referral line, [facility], at [phone].    If you feel you have received this communication in error or would no longer like to receive these types of communications, please e-mail [email or web address]

## 2020-05-30 NOTE — PATIENT INSTRUCTIONS
What Is Palliative Care?  Palliative care is a way to improve quality of life for someone who is being treated for a serious illness. To palliate means to ease the symptoms of an illness. Palliative care providers are experts in easing symptoms that cause distress. These may include pain, nausea, vomiting, anxiety, constipation, sleeping, and breathing problems. The people who are being treated and their loved ones are given emotional and spiritual support. Palliative care is given at the same time as traditional medical care. Active treatment for the illness does not stop.     Both the person receiving treatment and family members help direct the plan of care with the palliative care team.   Goals of palliative care  · Easing symptoms that cause distress. The main goal of palliative care is to ease symptoms. Symptoms may affect a persons ability to eat, be active, or spend time with others. Medicines and other methods are used. This gives the person a better quality of life while the illness is being treated.  · Coordinating care. This helps to make sure that each care provider is aware of the goals of care. Communication is done on a regular basis among all team members to make sure that the care goals are met.  · Meeting emotional and spiritual needs. The care team helps both the person being treated and family members cope with stress, depression, anxiety, and other issues. They can set up meetings with a counselor or  as desired.  · Giving information and helping with decisions. Care providers can help people and their families get the information they need. They can also help when care decisions need to be made.  · Helping create an advance care plan. This is a series of legal documents that note a persons wishes for their future healthcare. It helps to make sure that if people cant speak for themselves, their wishes can still be carried out. The documents vary by state.  Working with  your palliative care team  Palliative care is given by a team of people who focus on the physical, emotional, and psychosocial aspects of advanced illness. The team may include a palliative care provider or nurse, , pharmacist, dietitian, counselor, , and others. To get the most of palliative care, both the person and his or her loved ones have a role.  What a person who is receiving medical treatment can do  Tell your healthcare provider you are thinking about palliative care. Ask what palliative services are available in your area.  To ensure the best care, learn what you can about your illness and the goals of your care. If you are having pain and other symptoms due to a serious illness, ask your healthcare provider for a palliative care referral.  Treating these symptoms is best for your health and quality of life. If you need support in other ways, speak up. The care team is there to help you get what you need.  What a family member can do  Talk with the palliative care team often. Do your best to understand your loved ones illness and goals of care. When decisions need to be made, act on your loved ones wishes. And if you have a concern or question, speak up. You can help the team make sure that your loved one has the best quality of life possible.  Date Last Reviewed: 3/1/2017  © 5592-9982 [employer]. [address] [place] [address], [place] [address]. All rights reserved. This information is not intended as a substitute for professional medical care. Always follow your healthcare professional's instructions.

## 2020-05-30 NOTE — PROGRESS NOTES
Subjective:       Patient ID: Santos Morales is a 85 y.o. male.    Chief Complaint: Dementia    86 yo male seen for initial palliative med consult. He has history of Alzheimer's dementia and is cared for by his elderly wife. They both live in their own private residence and have no outside assistance. He was hospitalized in February--met with hospice but was told he didn't quality. His wife reports appetite and intake are good. He has had behavioral disturbances, wandering behavior and disordered sleep. Sleep improved with medication prior to bedtime (seroquel and clonazepam). He does not like to use the bathroom in the toilet but often insists on urinating on the floor, which has been difficult for his wife. He is mostly non-verbal. Icelandic is his native language.    She reports that he has recently had runny nose and cough; afebrile. Requests COVID testing.     Advance Care Planning    Code Status  In light of the patients advanced and life limiting illness,I engaged the the family in a conversation about the patient's preferences for care  at the very end of life. The patient wishes to have a natural, peaceful death.  Along those lines, the patient does not wish to have CPR or other invasive treatments performed when his heart and/or breathing stops. I communicated to the family that a LaPOST form was completed to reflect other EOL preferences of the patient such as DNR, comfort focused care, DNI, no artificial nutrition.  I spent a total of 20 minutes engaging the patients wife in this advance care planning discussion.           does not have any pertinent problems on file.  Past Medical History:   Diagnosis Date    Anxiety     Atrial fibrillation     Chronic congestive heart failure 2/20/2020    Dementia 02/19/2020    Depression     Hypertension     Insomnia     Mobitz type 1 second degree AV block     followed by Dr. Hardy, Cardiology    SAMUEL (obstructive sleep apnea)      Past Surgical History:    Procedure Laterality Date    BACK SURGERY      CATARACT EXTRACTION W/  INTRAOCULAR LENS IMPLANT Bilateral      Family History   Problem Relation Age of Onset    Cancer Father         Stomach Ca     Diabetes Paternal Grandmother      Social History     Socioeconomic History    Marital status:      Spouse name: Not on file    Number of children: Not on file    Years of education: Not on file    Highest education level: Not on file   Occupational History    Not on file   Social Needs    Financial resource strain: Not on file    Food insecurity:     Worry: Not on file     Inability: Not on file    Transportation needs:     Medical: Not on file     Non-medical: Not on file   Tobacco Use    Smoking status: Never Smoker    Smokeless tobacco: Never Used   Substance and Sexual Activity    Alcohol use: Yes     Comment: socially     Drug use: No    Sexual activity: Not on file   Lifestyle    Physical activity:     Days per week: Not on file     Minutes per session: Not on file    Stress: Not on file   Relationships    Social connections:     Talks on phone: Not on file     Gets together: Not on file     Attends Confucianist service: Not on file     Active member of club or organization: Not on file     Attends meetings of clubs or organizations: Not on file     Relationship status: Not on file   Other Topics Concern    Not on file   Social History Narrative    Not on file     Review of Systems   Unable to perform ROS: Dementia       Objective:     Vitals:    05/30/20 1232   BP: (!) 126/46   Pulse: (!) 35   Temp: 98.4 °F (36.9 °C)        Physical Exam   Constitutional: He is oriented to person, place, and time. He appears well-developed and well-nourished.   HENT:   Head: Normocephalic and atraumatic.   Eyes: Pupils are equal, round, and reactive to light. EOM are normal.   Neck: Normal range of motion. Neck supple.   Cardiovascular: Normal rate and regular rhythm.   Bradycardic; wife reports this is  his baseline   Pulmonary/Chest: Effort normal and breath sounds normal.   Abdominal: Soft. Bowel sounds are normal.   Musculoskeletal: Normal range of motion. He exhibits no deformity.   Neurological: He is alert and oriented to person, place, and time.   Skin: Skin is warm and dry.   Psychiatric: He has a normal mood and affect. His behavior is normal.   Nursing note and vitals reviewed.      Assessment:       1. Late onset Alzheimer's disease with behavioral disturbance    2. Exposure to Covid-19 Virus        Plan:           Problem List Items Addressed This Visit     None      Visit Diagnoses     Late onset Alzheimer's disease with behavioral disturbance    -  Primary    Relevant Medications    QUEtiapine (SEROQUEL) 25 MG Tab    Other Relevant Orders    Ambulatory referral/consult to Palliative Care    Exposure to Covid-19 Virus        Relevant Orders    COVID-19 Routine Screening      Daytime dose of seroquel added for daytime behavioral disturbances; [facility] consulted for home visit to bridge until qualifies for hospice.     Thank you for involving me in the care of this patient. I will continue to follow PRN.

## 2020-06-02 ENCOUNTER — TELEPHONE (OUTPATIENT)
Dept: PALLIATIVE MEDICINE | Facility: CLINIC | Age: 85
End: 2020-06-02

## 2020-06-02 ENCOUNTER — DOCUMENTATION ONLY (OUTPATIENT)
Dept: PALLIATIVE MEDICINE | Facility: CLINIC | Age: 85
End: 2020-06-02

## 2020-06-02 NOTE — TELEPHONE ENCOUNTER
[facility] - Palliative Care  Medical Specialty       Patient Name: Santos Morales  MRN: [id number]  Primary Care Physician: Diane Bowen MD     Received call from Kurt with [facility] of . They have a visit scheduled with family for tomorrow.      Erik Ness, MSW, [facility]  [phone]

## 2020-06-02 NOTE — PROGRESS NOTES
[facility] - Palliative Care  Medical Specialty       Patient Name: Santos Morales  MRN: [id number]  Primary Care Physician: Diane Bowen MD  Reason for Referral: home-based palliative care    Referral sent to [facility], [facility]  [phone]

## 2020-06-10 DIAGNOSIS — F41.9 ANXIETY: ICD-10-CM

## 2020-06-10 RX ORDER — QUETIAPINE FUMARATE 50 MG/1
TABLET, FILM COATED ORAL
Qty: 30 TABLET | Refills: 0 | Status: SHIPPED | OUTPATIENT
Start: 2020-06-10 | End: 2020-07-06

## 2020-06-10 RX ORDER — SERTRALINE HYDROCHLORIDE 50 MG/1
TABLET, FILM COATED ORAL
Qty: 90 TABLET | Refills: 0 | Status: SHIPPED | OUTPATIENT
Start: 2020-06-10

## 2020-06-10 RX ORDER — CLONAZEPAM 1 MG/1
TABLET ORAL
Qty: 30 TABLET | Refills: 0 | Status: SHIPPED | OUTPATIENT
Start: 2020-06-10 | End: 2020-07-06

## 2020-07-03 DIAGNOSIS — F41.9 ANXIETY: ICD-10-CM

## 2020-07-03 DIAGNOSIS — F32.9 MAJOR DEPRESSION, CHRONIC: ICD-10-CM

## 2020-07-06 RX ORDER — SERTRALINE HYDROCHLORIDE 100 MG/1
TABLET, FILM COATED ORAL
Qty: 90 TABLET | Refills: 0 | Status: SHIPPED | OUTPATIENT
Start: 2020-07-06

## 2020-07-06 RX ORDER — CLONAZEPAM 1 MG/1
TABLET ORAL
Qty: 30 TABLET | Refills: 0 | Status: SHIPPED | OUTPATIENT
Start: 2020-07-06

## 2020-07-06 RX ORDER — QUETIAPINE FUMARATE 50 MG/1
TABLET, FILM COATED ORAL
Qty: 30 TABLET | Refills: 0 | Status: SHIPPED | OUTPATIENT
Start: 2020-07-06

## 2020-07-16 ENCOUNTER — TELEPHONE (OUTPATIENT)
Dept: FAMILY MEDICINE | Facility: CLINIC | Age: 85
End: 2020-07-16

## 2020-07-16 ENCOUNTER — DOCUMENTATION ONLY (OUTPATIENT)
Dept: PALLIATIVE MEDICINE | Facility: HOSPITAL | Age: 85
End: 2020-07-16

## 2023-02-20 NOTE — PROGRESS NOTES
Subjective:       Patient ID: Santos Morales is a 84 y.o. male.    Chief Complaint: Follow-up    84 y old male with a fib , symptomatic bradycardia, depression , htn , stage 3 ckd here for f.u . Sadness lingers . Has refused to see psych . Non suicidal .  Has to contact EP to schedule PM insertion . No cp , sob , dizziness. Avoids NSAID.  Also with purulent drainage  , erythema and pain form Previous R l laceration on 5/22 by dog scratch  . Staples were removed on 6/5 .     Review of Systems   Constitutional: Negative.    HENT: Negative.    Eyes: Negative.    Respiratory: Negative.    Cardiovascular: Negative.    Gastrointestinal: Negative.    Genitourinary: Negative.    Musculoskeletal: Negative.    Skin: Positive for wound.   Hematological: Negative.        Objective:      Physical Exam   Constitutional: He is oriented to person, place, and time. No distress.   HENT:   Head: Normocephalic and atraumatic.   Right Ear: External ear normal.   Left Ear: External ear normal.   Nose: Nose normal.   Mouth/Throat: No oropharyngeal exudate.   Eyes: Pupils are equal, round, and reactive to light. Conjunctivae and EOM are normal. Right eye exhibits no discharge. Left eye exhibits no discharge. No scleral icterus.   Neck: Normal range of motion. Neck supple. No JVD present. No tracheal deviation present. No thyromegaly present.   Cardiovascular: Normal rate, regular rhythm, normal heart sounds and intact distal pulses. Exam reveals no gallop and no friction rub.   No murmur heard.  Pulmonary/Chest: Effort normal and breath sounds normal. No stridor. No respiratory distress. He has no wheezes. He has no rales. He exhibits no tenderness.   Abdominal: Soft. Bowel sounds are normal. He exhibits no distension. There is no tenderness. There is no rebound and no guarding.   Musculoskeletal: Normal range of motion. He exhibits no tenderness.        Right lower leg: He exhibits swelling and edema.        Legs:  Lymphadenopathy:      He has no cervical adenopathy.   Neurological: He is alert and oriented to person, place, and time. He has normal reflexes. He displays normal reflexes. No cranial nerve deficit. He exhibits normal muscle tone. Coordination normal.   Skin: Skin is warm and dry. No rash noted. He is not diaphoretic. No erythema. No pallor.   Psychiatric: He has a normal mood and affect. His behavior is normal. Judgment and thought content normal.       Assessment:     Santos was seen today for follow-up.    Diagnoses and all orders for this visit:    Current moderate episode of major depressive disorder without prior episode    CKD (chronic kidney disease) stage 3, GFR 30-59 ml/min    Chronic atrial fibrillation    Symptomatic bradycardia    Wound of right lower extremity, subsequent encounter  -     CULTURE, AEROBIC  (SPECIFY SOURCE)    Other orders  -     mupirocin (BACTROBAN) 2 % ointment; Apply topically 3 (three) times daily.  -     sulfamethoxazole-trimethoprim 800-160mg (BACTRIM DS) 800-160 mg Tab; Take 1 tablet by mouth 2 (two) times daily.      Plan:   Unchanged. Cont med   Avoid NSAID  F.u with card   WS discussed. Wound care also . F.u in 7 d    Dressing (No Sutures): dry sterile dressing

## 2024-02-05 NOTE — ASSESSMENT & PLAN NOTE
Place in observation  Cardiology Cardiology   Hold amlodipine   NPO p MN possible PPM placement  Atropine as needed symptomatic bradycardia      migraine